# Patient Record
Sex: FEMALE | Race: OTHER | Employment: FULL TIME | URBAN - METROPOLITAN AREA
[De-identification: names, ages, dates, MRNs, and addresses within clinical notes are randomized per-mention and may not be internally consistent; named-entity substitution may affect disease eponyms.]

---
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
## 2021-01-27 ENCOUNTER — HOSPITAL ENCOUNTER (OUTPATIENT)
Age: 21
Discharge: HOME | End: 2021-01-27
Payer: MEDICAID

## 2021-01-27 ENCOUNTER — HOSPITAL ENCOUNTER (EMERGENCY)
Facility: HOSPITAL | Age: 21
Discharge: HOME | End: 2021-01-27
Attending: EMERGENCY MEDICINE
Payer: MEDICAID

## 2021-01-27 VITALS
HEIGHT: 63 IN | TEMPERATURE: 98 F | HEART RATE: 50 BPM | DIASTOLIC BLOOD PRESSURE: 72 MMHG | SYSTOLIC BLOOD PRESSURE: 130 MMHG | RESPIRATION RATE: 18 BRPM | OXYGEN SATURATION: 100 % | BODY MASS INDEX: 23.04 KG/M2 | WEIGHT: 130 LBS

## 2021-01-27 VITALS
SYSTOLIC BLOOD PRESSURE: 126 MMHG | HEART RATE: 73 BPM | DIASTOLIC BLOOD PRESSURE: 80 MMHG | RESPIRATION RATE: 18 BRPM | OXYGEN SATURATION: 98 % | TEMPERATURE: 97 F

## 2021-01-27 DIAGNOSIS — Z20.822 EXPOSURE TO COVID-19 VIRUS: Primary | ICD-10-CM

## 2021-01-27 DIAGNOSIS — Z20.822 ENCOUNTER FOR SCREENING LABORATORY TESTING FOR COVID-19 VIRUS: Primary | ICD-10-CM

## 2021-01-27 LAB — SARS-COV-2 RNA RESP QL NAA+PROBE: NOT DETECTED

## 2021-01-27 PROCEDURE — 99283 EMERGENCY DEPT VISIT LOW MDM: CPT

## 2021-01-27 PROCEDURE — 99212 OFFICE O/P EST SF 10 MIN: CPT | Performed by: NURSE PRACTITIONER

## 2021-01-27 NOTE — ED PROVIDER NOTES
Patient Seen in: [facility]      History   No chief complaint on file.     Stated Complaint: exposed/covid test/no symptoms    HPI/Subjective:   HPI    This is a 41-year-old female presenting for Covid testing, after exposure, asymptomatic wit ED Course     Labs Reviewed   RAPID SARS-COV-2 BY PCR - Normal                   MDM      30-year-old female well-appearing and nontoxic afebrile presenting for Covid testing after exposure. Rapid test collected and negative.   Patient made miguel
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
## 2021-01-27 NOTE — PROGRESS NOTES
ER note noted. Requested Prescriptions   Pending Prescriptions Disp Refills     pseudoePHEDrine (EQL 12 HOUR DECONGESTANT) 120 MG 12 hr tablet [Pharmacy Med Name: EQL 12 Hour Decongestant Oral Tablet Extended Release 12 Hour 120 MG] 60 tablet 2     Sig: TAKE ONE TABLET BY MOUTH EVERY TWELVE HOURS       There is no refill protocol information for this order         Last Written Prescription Date:  5/16/19  Last Fill Quantity: 60,   # refills: 3  Last Office Visit: 8/22/19  Future Office visit:       Routing refill request to provider for review/approval because:  Drug not on the [facility], P or [facility] refill protocol or controlled substance

## 2021-01-28 NOTE — Clinical Note
2/23/2017          To Whom It May Concern:    Justin Vidal is currently under my medical care. Please allow her to use the elevator at school due to knee pain for 6 weeks. If you require additional information please contact our office.         Sincere Immunization has been faxed to number that was provided [phone]

## 2021-01-29 LAB — SARS-COV-2 BY PCR: NOT DETECTED

## 2021-04-12 ENCOUNTER — TELEPHONE (OUTPATIENT)
Dept: FAMILY MEDICINE CLINIC | Facility: CLINIC | Age: 21
End: 2021-04-12

## 2021-04-12 RX ORDER — FLUCONAZOLE 150 MG/1
150 TABLET ORAL ONCE
Qty: 1 TABLET | Refills: 0 | Status: SHIPPED | OUTPATIENT
Start: 2021-04-12 | End: 2021-04-12

## 2021-04-12 NOTE — TELEPHONE ENCOUNTER
Patient calling reports has Yeast infection, thick white discharge ( cottage cheese ) and groin nodes are swollen   She cannot use OTC Monistat, she has a reaction    Declines appt at this time     Asking for RX for      Allergies reviewed and pharmacy con

## 2021-04-19 ENCOUNTER — LAB ENCOUNTER (OUTPATIENT)
Dept: LAB | Age: 21
End: 2021-04-19
Attending: DERMATOLOGY
Payer: MEDICAID

## 2021-04-19 ENCOUNTER — OFFICE VISIT (OUTPATIENT)
Dept: DERMATOLOGY CLINIC | Facility: CLINIC | Age: 21
End: 2021-04-19
Payer: MEDICAID

## 2021-04-19 DIAGNOSIS — N92.6 IRREGULAR MENSES: ICD-10-CM

## 2021-04-19 DIAGNOSIS — L70.0 ACNE VULGARIS: ICD-10-CM

## 2021-04-19 DIAGNOSIS — N92.6 IRREGULAR MENSES: Primary | ICD-10-CM

## 2021-04-19 PROCEDURE — 84402 ASSAY OF FREE TESTOSTERONE: CPT

## 2021-04-19 PROCEDURE — 84146 ASSAY OF PROLACTIN: CPT

## 2021-04-19 PROCEDURE — 99214 OFFICE O/P EST MOD 30 MIN: CPT | Performed by: DERMATOLOGY

## 2021-04-19 PROCEDURE — 84443 ASSAY THYROID STIM HORMONE: CPT

## 2021-04-19 PROCEDURE — 82627 DEHYDROEPIANDROSTERONE: CPT

## 2021-04-19 PROCEDURE — 36415 COLL VENOUS BLD VENIPUNCTURE: CPT

## 2021-04-19 PROCEDURE — 84403 ASSAY OF TOTAL TESTOSTERONE: CPT

## 2021-04-19 RX ORDER — MINOCYCLINE HYDROCHLORIDE 50 MG/1
50 TABLET ORAL 2 TIMES DAILY
Qty: 60 TABLET | Refills: 2 | Status: SHIPPED | OUTPATIENT
Start: 2021-04-19 | End: 2021-06-30

## 2021-04-25 NOTE — PROGRESS NOTES
Antolin Jones is a 22year old female. Patient presents with:  Acne: LOV 9/2020. Follow up acne to face, chest, back, and shoulders. Pt has been using clindamycin gel, tretinoin 0.5%, tretinoin 0.1%.  Pt states she has not been having breakouts but meals. (Patient not taking: Reported on 4/19/2021 ) 30 tablet 1      Past Medical History:   Diagnosis Date   • Other ill-defined conditions(799.89)     PET @ 1 yr      Social History:  Social History    Tobacco Use      Smoking status: Never Smoker      S with meals.  (Patient not taking: Reported on 4/19/2021 ) 30 tablet 1     Allergies:     Seafood                 HIVES  Strawberries            HIVES, RASH    Past Medical History:   Diagnosis Date   • Other ill-defined conditions(219.89)     PET @ 1 yr Active Member of Clubs or Organizations:       Attends Club or Organization Meetings:       Marital Status:   Intimate Partner Violence:       Fear of Current or Ex-Partner:       Emotionally Abused:       Physically Abused:       Sexually Abused:   Family face,nails, hair, external eyes, including conjunctival mucosa, eyelids, lips, external ears, back, chest, abdomen, arms, legs, palms.   Remarkable for lesions as noted extensive inflammatory papules pustules nodules and follicular keratotic lesions over th Medications in grid. Instructions reviewed at length. General skin care questions answered. Reassurance regarding benign skin lesions. Signs and symptoms of skin cancer, ABCDE's of melanoma briefly reviewed.   Sunscreen use, sun protection, encouraged

## 2021-05-12 ENCOUNTER — TELEPHONE (OUTPATIENT)
Dept: FAMILY MEDICINE CLINIC | Facility: CLINIC | Age: 21
End: 2021-05-12

## 2021-05-12 RX ORDER — DEXTROAMPHETAMINE SACCHARATE, AMPHETAMINE ASPARTATE, DEXTROAMPHETAMINE SULFATE AND AMPHETAMINE SULFATE 2.5; 2.5; 2.5; 2.5 MG/1; MG/1; MG/1; MG/1
10 TABLET ORAL 2 TIMES DAILY
Qty: 60 TABLET | Refills: 0 | Status: SHIPPED | OUTPATIENT
Start: 2021-05-12 | End: 2021-06-02

## 2021-05-12 NOTE — TELEPHONE ENCOUNTER
Message noted. May switch to see if adderall would be covered and cancel ritalin Erx sent to listed pharmacy.  Please notify patient

## 2021-05-12 NOTE — TELEPHONE ENCOUNTER
Up to patient if she wants to pay out of pocket for this if not covered by insurance. Not sure if anything is covered for these medications. Can also follow up with psychiatrist as we discuss in office about other options.

## 2021-05-12 NOTE — TELEPHONE ENCOUNTER
The medication pt was prescribed (Ritalin) was denied by her insurance because she is too old for the medication. Pt states Dr would need to either send request to insurance to approve medication. Or, pt is ok with being prescribed alternate medication.

## 2021-05-12 NOTE — TELEPHONE ENCOUNTER
Advised patient of Dr Jeffrey Gallagher  note. Patient verbalized understanding and had no further questions. She is choosing to pay out of pocket for Ritalin.

## 2021-05-12 NOTE — PROGRESS NOTES
HPI/Subjective:   Patient ID: Craig Infante is a 22year old female. This visit is conducted using Telemedicine with live, interactive video and audio during this Coronavirus pandemic.     Please note that the following visit was completed using two- Negative for agitation, behavioral problems, dysphoric mood, self-injury and suicidal ideas. The patient is nervous/anxious. The patient is not hyperactive.       Current Outpatient Medications   Medication Sig Dispense Refill   • methylphenidate (RITALIN) Physical Exam  Constitutional:       Appearance: Normal appearance. Neurological:      Mental Status: She is alert. Psychiatric:         Mood and Affect: Mood normal.         Behavior: Behavior normal.         Thought Content:  Thought content normal.

## 2021-05-12 NOTE — TELEPHONE ENCOUNTER
Pt advised, but states that the pharm told her that adderall is not covered by her insurance either. Please advise.

## 2021-06-02 ENCOUNTER — TELEPHONE (OUTPATIENT)
Dept: FAMILY MEDICINE CLINIC | Facility: CLINIC | Age: 21
End: 2021-06-02

## 2021-06-02 RX ORDER — METHYLPHENIDATE HYDROCHLORIDE 10 MG/1
10 TABLET ORAL 2 TIMES DAILY
Qty: 60 TABLET | Refills: 0 | Status: SHIPPED | OUTPATIENT
Start: 2021-06-02

## 2021-06-02 NOTE — TELEPHONE ENCOUNTER
I do not see Ritalin on her current med list.   I called the patient to inquire. She states med was prescribed by Dr. Joselin Garza at her [address], however the insurance did not cover it. Therefore it was discontinued.  However she decided to purchase it out of pocket

## 2021-06-02 NOTE — TELEPHONE ENCOUNTER
Patient wants to know if the two medications interfere with each other,  Ritalin and   Rizatriptan benzoate.       Please call [phone] for recommendation,

## 2021-06-02 NOTE — TELEPHONE ENCOUNTER
Message noted: Chart reviewed and may refill ritalin as requested. Script sent to listed pharmacy by secure method. Please notify pt. As far I know, should be ok with rizatriptan.  Could have some issues with blood pressure together and sometimes make so

## 2021-06-29 ENCOUNTER — NURSE TRIAGE (OUTPATIENT)
Dept: FAMILY MEDICINE CLINIC | Facility: CLINIC | Age: 21
End: 2021-06-29

## 2021-06-29 NOTE — TELEPHONE ENCOUNTER
Action Requested: Summary for Provider     []  Critical Lab, Recommendations Needed  [] Need Additional Advice  [x]   FYI    []   Need Orders  [] Need Medications Sent to Pharmacy  []  Other     SUMMARY:   Spoke with pt,  verified, pt stated she just no

## 2021-06-30 ENCOUNTER — OFFICE VISIT (OUTPATIENT)
Dept: FAMILY MEDICINE CLINIC | Facility: CLINIC | Age: 21
End: 2021-06-30
Payer: MEDICAID

## 2021-06-30 VITALS
HEART RATE: 69 BPM | TEMPERATURE: 97 F | BODY MASS INDEX: 22.93 KG/M2 | DIASTOLIC BLOOD PRESSURE: 68 MMHG | HEIGHT: 63 IN | SYSTOLIC BLOOD PRESSURE: 109 MMHG | WEIGHT: 129.38 LBS

## 2021-06-30 DIAGNOSIS — R22.0 LOCALIZED SWELLING, MASS AND LUMP, HEAD: ICD-10-CM

## 2021-06-30 PROCEDURE — 3008F BODY MASS INDEX DOCD: CPT | Performed by: FAMILY MEDICINE

## 2021-06-30 PROCEDURE — 3074F SYST BP LT 130 MM HG: CPT | Performed by: FAMILY MEDICINE

## 2021-06-30 PROCEDURE — 99213 OFFICE O/P EST LOW 20 MIN: CPT | Performed by: FAMILY MEDICINE

## 2021-06-30 PROCEDURE — 3078F DIAST BP <80 MM HG: CPT | Performed by: FAMILY MEDICINE

## 2021-06-30 NOTE — PROGRESS NOTES
HPI/Subjective:   Patient ID: Valentina Thapa is a 22year old female. Pt presents with a bump behind the right ear she noticed this past Monday. No pains or fevers. No redness of drainage. No acute illness, ear aches or sore throat. No other lumps.  P daily. (Patient not taking: Reported on 4/19/2021 )     • Metoclopramide HCl 5 MG Oral Tab 1 tablet as needed for nausea associated with migraine, no more than 3 tablets in 24 hours (Patient not taking: Reported on 4/19/2021 ) 30 tablet 3   • naproxen (NAP

## 2021-07-08 ENCOUNTER — HOSPITAL ENCOUNTER (EMERGENCY)
Facility: HOSPITAL | Age: 21
Discharge: HOME | End: 2021-07-08
Attending: EMERGENCY MEDICINE
Payer: MEDICAID

## 2021-07-08 ENCOUNTER — APPOINTMENT (OUTPATIENT)
Dept: GENERAL RADIOLOGY | Facility: HOSPITAL | Age: 21
End: 2021-07-08
Attending: EMERGENCY MEDICINE
Payer: MEDICAID

## 2021-07-08 VITALS
OXYGEN SATURATION: 99 % | DIASTOLIC BLOOD PRESSURE: 74 MMHG | HEIGHT: 63 IN | HEART RATE: 67 BPM | BODY MASS INDEX: 23.04 KG/M2 | TEMPERATURE: 99 F | WEIGHT: 130 LBS | SYSTOLIC BLOOD PRESSURE: 125 MMHG | RESPIRATION RATE: 16 BRPM

## 2021-07-08 DIAGNOSIS — S29.012A STRAIN OF THORACIC SPINE: ICD-10-CM
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
## 2021-07-08 LAB — B-HCG UR QL: NEGATIVE

## 2021-07-08 PROCEDURE — 96372 THER/PROPH/DIAG INJ SC/IM: CPT

## 2021-07-08 PROCEDURE — 72072 X-RAY EXAM THORAC SPINE 3VWS: CPT | Performed by: EMERGENCY MEDICINE

## 2021-07-08 PROCEDURE — 71101 X-RAY EXAM UNILAT RIBS/CHEST: CPT | Performed by: EMERGENCY MEDICINE

## 2021-07-08 PROCEDURE — 81025 URINE PREGNANCY TEST: CPT

## 2021-07-08 PROCEDURE — 99284 EMERGENCY DEPT VISIT MOD MDM: CPT

## 2021-07-08 RX ORDER — CYCLOBENZAPRINE HCL 10 MG
10 TABLET ORAL 3 TIMES DAILY PRN
Qty: 10 TABLET | Refills: 0 | Status: SHIPPED | OUTPATIENT
Start: 2021-07-08 | End: 2021-07-11

## 2021-07-08 RX ORDER — KETOROLAC TROMETHAMINE 30 MG/ML
30 INJECTION, SOLUTION INTRAMUSCULAR; INTRAVENOUS ONCE
Status: COMPLETED | OUTPATIENT
Start: 2021-07-08 | End: 2021-07-08

## 2021-07-08 RX ORDER — KETOROLAC TROMETHAMINE 30 MG/ML
30 INJECTION, SOLUTION INTRAMUSCULAR; INTRAVENOUS ONCE
Status: DISCONTINUED | OUTPATIENT
Start: 2021-07-08 | End: 2021-07-08

## 2021-07-08 RX ORDER — IBUPROFEN 600 MG/1
600 TABLET ORAL EVERY 8 HOURS PRN
Qty: 15 TABLET | Refills: 0 | Status: SHIPPED | OUTPATIENT
Start: 2021-07-08 | End: 2021-07-13

## 2021-07-08 RX ORDER — LIDOCAINE 50 MG/G
1 PATCH TOPICAL EVERY 24 HOURS
Qty: 6 PATCH | Refills: 0 | Status: SHIPPED | OUTPATIENT
Start: 2021-07-08 | End: 2021-07-14
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
## 2021-07-14 ENCOUNTER — OFFICE VISIT (OUTPATIENT)
Dept: OBGYN CLINIC | Facility: CLINIC | Age: 21
End: 2021-07-14
Payer: MEDICAID

## 2021-07-14 ENCOUNTER — HOSPITAL ENCOUNTER (OUTPATIENT)
Age: 21
Discharge: HOME | End: 2021-07-14
Attending: EMERGENCY MEDICINE
Payer: MEDICAID

## 2021-07-14 VITALS
DIASTOLIC BLOOD PRESSURE: 79 MMHG | HEART RATE: 62 BPM | BODY MASS INDEX: 23 KG/M2 | WEIGHT: 130 LBS | SYSTOLIC BLOOD PRESSURE: 119 MMHG

## 2021-07-14 VITALS
TEMPERATURE: 98 F | OXYGEN SATURATION: 99 % | HEART RATE: 79 BPM | RESPIRATION RATE: 18 BRPM | DIASTOLIC BLOOD PRESSURE: 68 MMHG | SYSTOLIC BLOOD PRESSURE: 117 MMHG

## 2021-07-14 DIAGNOSIS — N89.8 VAGINAL DISCHARGE: Primary | ICD-10-CM

## 2021-07-14 DIAGNOSIS — N92.1 BREAKTHROUGH BLEEDING ASSOCIATED WITH INTRAUTERINE DEVICE (IUD): ICD-10-CM

## 2021-07-14 DIAGNOSIS — J02.9 VIRAL PHARYNGITIS: Primary | ICD-10-CM

## 2021-07-14 DIAGNOSIS — Z30.431 IUD CHECK UP: ICD-10-CM

## 2021-07-14 DIAGNOSIS — Z97.5 BREAKTHROUGH BLEEDING ASSOCIATED WITH INTRAUTERINE DEVICE (IUD): ICD-10-CM

## 2021-07-14 LAB
S PYO AG THROAT QL: NEGATIVE
SARS-COV-2 RNA RESP QL NAA+PROBE: NOT DETECTED

## 2021-07-14 PROCEDURE — 3074F SYST BP LT 130 MM HG: CPT | Performed by: ADVANCED PRACTICE MIDWIFE

## 2021-07-14 PROCEDURE — 87880 STREP A ASSAY W/OPTIC: CPT

## 2021-07-14 PROCEDURE — 99213 OFFICE O/P EST LOW 20 MIN: CPT | Performed by: ADVANCED PRACTICE MIDWIFE

## 2021-07-14 PROCEDURE — 3078F DIAST BP <80 MM HG: CPT | Performed by: ADVANCED PRACTICE MIDWIFE

## 2021-07-14 PROCEDURE — 99213 OFFICE O/P EST LOW 20 MIN: CPT

## 2021-07-14 RX ORDER — DOXYCYCLINE HYCLATE 100 MG
100 TABLET ORAL 2 TIMES DAILY
Qty: 15 TABLET | Refills: 0 | Status: SHIPPED | OUTPATIENT
Start: 2021-07-14 | End: 2021-07-21

## 2021-07-14 NOTE — ED INITIAL ASSESSMENT (HPI)
Patient with a sore throat since Sunday. Patient may have had an exposure to COVID at her job but is not sure.  Patient requests a COVID test.

## 2021-07-16 LAB
GENITAL VAGINOSIS SCREEN: NEGATIVE
TRICHOMONAS SCREEN: NEGATIVE

## 2021-07-16 NOTE — ED PROVIDER NOTES
Patient Seen in: [facility]      History   Patient presents with:  Sore Throat    Stated Complaint: sore throat    HPI/Subjective:   HPI    21 yo female with several days of sore throat. Thinks she has had a COVID exposure at work. No fever. Lymphadenopathy:      Cervical: No cervical adenopathy. Skin:     General: Skin is warm and dry. Capillary Refill: Capillary refill takes less than 2 seconds. Neurological:      General: No focal deficit present.       Mental Status: She is alert

## 2021-07-17 NOTE — PROGRESS NOTES
HPI/Subjective:   Patient ID: Jae Krishnan is a 22year old female who presents with increased vaginal discharge since placement of IUD. Discharge has no odor and she has no vaginal irritation. IUD x 18 months still has spotting most days.   Desires Area: No rash or pubic lice. Labia:         Right: No rash, tenderness, lesion or injury. Left: No rash, tenderness, lesion or injury. Vagina: No signs of injury (thin no odor) and foreign body. Vaginal discharge present.  No erythema, t

## 2021-08-04 ENCOUNTER — OFFICE VISIT (OUTPATIENT)
Dept: OBGYN CLINIC | Facility: CLINIC | Age: 21
End: 2021-08-04
Payer: MEDICAID

## 2021-08-04 VITALS
DIASTOLIC BLOOD PRESSURE: 72 MMHG | SYSTOLIC BLOOD PRESSURE: 112 MMHG | HEART RATE: 67 BPM | BODY MASS INDEX: 22 KG/M2 | WEIGHT: 126 LBS

## 2021-08-04 DIAGNOSIS — Z30.432 ENCOUNTER FOR REMOVAL OF INTRAUTERINE CONTRACEPTIVE DEVICE: Primary | ICD-10-CM

## 2021-08-04 PROCEDURE — 3074F SYST BP LT 130 MM HG: CPT | Performed by: ADVANCED PRACTICE MIDWIFE

## 2021-08-04 PROCEDURE — 3078F DIAST BP <80 MM HG: CPT | Performed by: ADVANCED PRACTICE MIDWIFE

## 2021-08-04 PROCEDURE — 58301 REMOVE INTRAUTERINE DEVICE: CPT | Performed by: ADVANCED PRACTICE MIDWIFE

## 2021-08-04 NOTE — PROCEDURES
IUD Removal       Birth control method(s) used:   IUD    Consent signed. Procedure discussed with the patient in detail including indication, risks, benefits, alternatives and complications.     Pelvic Exam Findings:  Lesion description: Cervix grossly nor

## 2021-08-30 ENCOUNTER — HOSPITAL ENCOUNTER (OUTPATIENT)
Age: 21
Discharge: HOME | End: 2021-08-30
Attending: EMERGENCY MEDICINE
Payer: MEDICAID

## 2021-08-30 VITALS
TEMPERATURE: 99 F | HEART RATE: 84 BPM | DIASTOLIC BLOOD PRESSURE: 50 MMHG | OXYGEN SATURATION: 99 % | BODY MASS INDEX: 24.29 KG/M2 | RESPIRATION RATE: 18 BRPM | WEIGHT: 132 LBS | HEIGHT: 62 IN | SYSTOLIC BLOOD PRESSURE: 119 MMHG

## 2021-08-30 DIAGNOSIS — B34.9 VIRAL SYNDROME: Primary | ICD-10-CM

## 2021-08-30 LAB — S PYO AG THROAT QL: NEGATIVE

## 2021-08-30 PROCEDURE — 99213 OFFICE O/P EST LOW 20 MIN: CPT

## 2021-08-30 PROCEDURE — 87880 STREP A ASSAY W/OPTIC: CPT

## 2021-08-30 NOTE — ED PROVIDER NOTES
Patient Seen in: [facility]      History   Patient presents with:  Sore Throat    Stated Complaint: testing; body aches,fever,sore throat    HPI/Subjective:   HPI    The patient is a 23-year-old female with 2 days of sore throat body aches an Cardiovascular:      Rate and Rhythm: Normal rate and regular rhythm. Pulmonary:      Effort: Pulmonary effort is normal.      Breath sounds: Normal breath sounds. Musculoskeletal:      Cervical back: Normal range of motion.       Right lower leg: No

## 2021-09-01 LAB — SARS-COV-2 RNA RESP QL NAA+PROBE: DETECTED

## 2021-09-02 ENCOUNTER — TELEPHONE (OUTPATIENT)
Dept: FAMILY MEDICINE CLINIC | Facility: CLINIC | Age: 21
End: 2021-09-02

## 2021-09-02 ENCOUNTER — TELEMEDICINE (OUTPATIENT)
Dept: FAMILY MEDICINE CLINIC | Facility: CLINIC | Age: 21
End: 2021-09-02

## 2021-09-02 ENCOUNTER — HOSPITAL ENCOUNTER (EMERGENCY)
Facility: HOSPITAL | Age: 21
Discharge: HOME | End: 2021-09-02
Payer: MEDICAID

## 2021-09-02 ENCOUNTER — APPOINTMENT (OUTPATIENT)
Dept: GENERAL RADIOLOGY | Facility: HOSPITAL | Age: 21
End: 2021-09-02
Payer: MEDICAID

## 2021-09-02 VITALS
SYSTOLIC BLOOD PRESSURE: 111 MMHG | OXYGEN SATURATION: 95 % | BODY MASS INDEX: 25 KG/M2 | WEIGHT: 135 LBS | TEMPERATURE: 99 F | DIASTOLIC BLOOD PRESSURE: 64 MMHG | RESPIRATION RATE: 15 BRPM | HEART RATE: 50 BPM

## 2021-09-02 DIAGNOSIS — U07.1 COVID-19: Primary | ICD-10-CM

## 2021-09-02 DIAGNOSIS — Z20.828 EXPOSURE TO SARS-ASSOCIATED CORONAVIRUS: Primary | ICD-10-CM

## 2021-09-02 PROCEDURE — 99213 OFFICE O/P EST LOW 20 MIN: CPT | Performed by: FAMILY MEDICINE

## 2021-09-02 PROCEDURE — 71045 X-RAY EXAM CHEST 1 VIEW: CPT

## 2021-09-02 PROCEDURE — 99283 EMERGENCY DEPT VISIT LOW MDM: CPT

## 2021-09-02 NOTE — TELEPHONE ENCOUNTER
Spoke with pt,  verified, pt stated she was seen at [address] [place] [address] on 21 and was tested positive for covid. Pt stated the last 3 days she had bloody mucous with yellow mucous about nickel size, she had 4-5 x but nothing today.    Pt denies taking aspirin, NSA for Disease Control & Prevention (CDC)  What to do if you are sick with coronavirus disease 2019, [employer]. pdf  Centers for Disease Control & Prevention (CDC)  10 things you can do t

## 2021-09-02 NOTE — ED PROVIDER NOTES
Patient Seen in: [facility] AND [facility] Emergency Department      History   Patient presents with:  Cough/URI    Stated Complaint: COVID +, coughing up blood    HPI/Subjective:   HPI    26-year-old female presents the emergency department with complaint of bl (Temporal)   Resp 20   Wt 61.2 kg   LMP 08/30/2021 (Exact Date)   SpO2 98%   BMI 24.69 kg/m²         Physical Exam  Vitals reviewed. Constitutional:       Appearance: Normal appearance. HENT:      Head: Normocephalic and atraumatic.       Right Ear: Tym signs are stable. No acute findings on the chest x-ray. Discussed supportive measures. Patient is not having any shortness of breath, chest pain or leg swelling. She had 1 productive cough that had a blood-tinged sputum. No further productive cough.

## 2021-09-02 NOTE — PROGRESS NOTES
HPI/Subjective:   Patient ID: Antolin Jones is a 22year old female. Virtual Telephone Check-In    Antolin Jones verbally consents to a Virtual/Telephone Check-In visit on 09/02/21. Patient has been referred to the [facility] website at [address] [place] [address]. o distress. Neurological:      Mental Status: She is alert.    Psychiatric:         Mood and Affect: Mood normal.         Behavior: Behavior normal.         Assessment & Plan:   COVID-19:  - After discussion with patient, will send patient to the ER for fur

## 2021-09-03 NOTE — TELEPHONE ENCOUNTER
Home Monitoring Day 1 of 10. What  was your temp today? - 98.9    How did you take your temp?     with an oral thermometer    What was your pulse ox today? Are you feeling short of breath today?    No      Is the shortness of breath better, the sa

## 2021-09-07 ENCOUNTER — PATIENT MESSAGE (OUTPATIENT)
Dept: FAMILY MEDICINE CLINIC | Facility: CLINIC | Age: 21
End: 2021-09-07

## 2021-09-07 NOTE — TELEPHONE ENCOUNTER
Home Monitoring Day 5 of 7. Patient is asymptomatic as of today. Symptoms started 8/27/21. She needs a negative Covid test result to come back to work.  [employer] pending for your review and approval.    What  was your temp today? - did not take it    How did

## 2021-09-07 NOTE — TELEPHONE ENCOUNTER
----- Message -----     From: Ernesto Araya     Sent: 9/7/2021  1:13 PM CDT       To: Clayton Segura MD  Subject: Visit Follow-up Question    Good afternoon. I had a question about my quarantine.  My school and work are telling me my quarantine should

## 2021-09-07 NOTE — TELEPHONE ENCOUNTER
COVID order signed. Pt notified through [facility].  Also answered question about quarantine

## 2021-09-08 ENCOUNTER — NURSE ONLY (OUTPATIENT)
Dept: LAB | Facility: HOSPITAL | Age: 21
End: 2021-09-08
Attending: FAMILY MEDICINE
Payer: MEDICAID

## 2021-09-08 DIAGNOSIS — Z20.828 EXPOSURE TO SARS-ASSOCIATED CORONAVIRUS: ICD-10-CM

## 2021-09-09 LAB — SARS-COV-2 RNA RESP QL NAA+PROBE: NOT DETECTED

## 2021-11-03 ENCOUNTER — HOSPITAL ENCOUNTER (EMERGENCY)
Facility: HOSPITAL | Age: 21
Discharge: HOME | End: 2021-11-03
Payer: MEDICAID

## 2021-11-03 VITALS
HEIGHT: 63 IN | WEIGHT: 130 LBS | OXYGEN SATURATION: 100 % | RESPIRATION RATE: 16 BRPM | HEART RATE: 66 BPM | SYSTOLIC BLOOD PRESSURE: 120 MMHG | BODY MASS INDEX: 23.04 KG/M2 | DIASTOLIC BLOOD PRESSURE: 85 MMHG | TEMPERATURE: 98 F

## 2021-11-03 DIAGNOSIS — R10.9 ABDOMINAL CRAMPING: Primary | ICD-10-CM

## 2021-11-03 PROCEDURE — 81025 URINE PREGNANCY TEST: CPT

## 2021-11-03 PROCEDURE — 36415 COLL VENOUS BLD VENIPUNCTURE: CPT

## 2021-11-03 PROCEDURE — 85025 COMPLETE CBC W/AUTO DIFF WBC: CPT

## 2021-11-03 PROCEDURE — 99283 EMERGENCY DEPT VISIT LOW MDM: CPT

## 2021-11-03 PROCEDURE — 80048 BASIC METABOLIC PNL TOTAL CA: CPT

## 2021-11-03 PROCEDURE — 81003 URINALYSIS AUTO W/O SCOPE: CPT

## 2021-11-03 RX ORDER — IBUPROFEN 600 MG/1
600 TABLET ORAL EVERY 8 HOURS PRN
Qty: 15 TABLET | Refills: 0 | Status: SHIPPED | OUTPATIENT
Start: 2021-11-03 | End: 2021-11-10

## 2021-11-03 NOTE — ED PROVIDER NOTES
Patient Seen in: [facility] AND [facility] Emergency Department    History   Patient presents with:  Abdomen/Flank Pain    Stated Complaint: Abdominal pain; Diarrhea    HPI    23 yo F here with lower abdominal pain with no n/v/d, urinary symptoms, f/c  DC IUD [employer] N[employer] systems reviewed and negative except as noted above. PSFH elements reviewed from today and agreed except as otherwise stated in HPI.     Physical Exam     ED Triage Vitals   BP 11/03/21 1107 137/89   Pulse 11/03/21 1107 70   Resp 11/03/21 1107 16   Temp CBC W/ DIFFERENTIAL[[id number]]                              Final result                 Please view results for these tests on the individual orders.    RAINBOW DRAW LAVENDER   RAINBOW DRAW LIGHT GREEN   RAINBOW DRAW GOLD   CBC W/ DIFFERENTIAL

## 2021-11-03 NOTE — ED QUICK NOTES
Pt. Reports pain in abdomen as if it's on the inside. She said it is worse when she sits and she's been having diarrhea.  Her biggest concern is that this is the same pain she felt before she got COVID in August. She reported that she has not been vaccinate

## 2021-12-15 ENCOUNTER — IMMUNIZATION (OUTPATIENT)
Dept: LAB | Facility: HOSPITAL | Age: 21
End: 2021-12-15
Attending: EMERGENCY MEDICINE
Payer: MEDICAID

## 2021-12-15 DIAGNOSIS — Z23 NEED FOR VACCINATION: Primary | ICD-10-CM

## 2021-12-15 PROCEDURE — 0001A SARSCOV2 VAC 30MCG/0.3ML IM: CPT

## 2021-12-26 ENCOUNTER — HOSPITAL ENCOUNTER (OUTPATIENT)
Age: 21
Discharge: HOME | End: 2021-12-26
Attending: EMERGENCY MEDICINE
Payer: MEDICAID

## 2021-12-26 VITALS
HEART RATE: 82 BPM | RESPIRATION RATE: 20 BRPM | SYSTOLIC BLOOD PRESSURE: 110 MMHG | DIASTOLIC BLOOD PRESSURE: 66 MMHG | TEMPERATURE: 99 F | OXYGEN SATURATION: 99 %

## 2021-12-26 DIAGNOSIS — U07.1 COVID-19: Primary | ICD-10-CM

## 2021-12-26 LAB
S PYO AG THROAT QL: NEGATIVE
SARS-COV-2 RNA RESP QL NAA+PROBE: DETECTED

## 2021-12-26 PROCEDURE — 99212 OFFICE O/P EST SF 10 MIN: CPT

## 2021-12-26 PROCEDURE — 87880 STREP A ASSAY W/OPTIC: CPT

## 2021-12-26 NOTE — ED PROVIDER NOTES
Patient Seen in: [facility]      History   Patient presents with:  Sore Throat    Stated Complaint: Strep test    Subjective:   HPI    Patient is a 41-year-old female, status post first Covid vaccination last week, who presents now with mild out of 5 and symmetric in the upper and lower extremities bilaterally  Extremities: No focal swelling or tenderness  Skin: No pallor, no redness or warmth to the touch      ED Course     Labs Reviewed   RAPID SARS-COV-2 BY PCR - Abnormal; Notable for the f

## 2021-12-27 ENCOUNTER — HOSPITAL ENCOUNTER (EMERGENCY)
Facility: HOSPITAL | Age: 21
Discharge: LEFT BEFORE DISCHARGE | End: 2021-12-27
Payer: MEDICAID

## 2021-12-30 ENCOUNTER — TELEMEDICINE (OUTPATIENT)
Dept: FAMILY MEDICINE CLINIC | Facility: CLINIC | Age: 21
End: 2021-12-30

## 2021-12-30 DIAGNOSIS — U07.1 COVID-19: Primary | ICD-10-CM

## 2021-12-30 PROCEDURE — 99213 OFFICE O/P EST LOW 20 MIN: CPT | Performed by: FAMILY MEDICINE

## 2021-12-30 NOTE — PROGRESS NOTES
Subjective:   Patient ID: Florian Fothergill is a 22year old female. This visit is conducted using Telemedicine with live, interactive video and audio during this Coronavirus pandemic.     Please note that the following visit was completed using two-way, 3   • Cranberry-Vitamin C (AZO CRANBERRY URINARY TRACT OR) Take by mouth. Allergies:  Seafood                 HIVES  Strawberries            HIVES, RASH    Objective:   Physical Exam  Constitutional:       General: She is not in acute distress.      A

## 2022-01-05 ENCOUNTER — HOSPITAL ENCOUNTER (EMERGENCY)
Facility: HOSPITAL | Age: 22
Discharge: HOME | End: 2022-01-05
Attending: EMERGENCY MEDICINE
Payer: MEDICAID

## 2022-01-05 ENCOUNTER — APPOINTMENT (OUTPATIENT)
Dept: CT IMAGING | Facility: HOSPITAL | Age: 22
End: 2022-01-05
Attending: EMERGENCY MEDICINE
Payer: MEDICAID

## 2022-01-05 VITALS
SYSTOLIC BLOOD PRESSURE: 121 MMHG | WEIGHT: 130 LBS | HEIGHT: 62 IN | TEMPERATURE: 98 F | RESPIRATION RATE: 18 BRPM | OXYGEN SATURATION: 98 % | BODY MASS INDEX: 23.92 KG/M2 | HEART RATE: 70 BPM | DIASTOLIC BLOOD PRESSURE: 72 MMHG

## 2022-01-05 DIAGNOSIS — M54.2 NECK PAIN: Primary | ICD-10-CM

## 2022-01-05 DIAGNOSIS — M54.12 CERVICAL RADICULOPATHY: ICD-10-CM

## 2022-01-05 PROCEDURE — 99284 EMERGENCY DEPT VISIT MOD MDM: CPT

## 2022-01-05 PROCEDURE — 72125 CT NECK SPINE W/O DYE: CPT | Performed by: EMERGENCY MEDICINE

## 2022-01-05 PROCEDURE — 96372 THER/PROPH/DIAG INJ SC/IM: CPT

## 2022-01-05 RX ORDER — LIDOCAINE 50 MG/G
1 PATCH TOPICAL EVERY 24 HOURS
Qty: 6 PATCH | Refills: 0 | Status: SHIPPED | OUTPATIENT
Start: 2022-01-05 | End: 2022-01-11

## 2022-01-05 RX ORDER — HYDROCODONE BITARTRATE AND ACETAMINOPHEN 5; 325 MG/1; MG/1
1 TABLET ORAL EVERY 6 HOURS PRN
Qty: 10 TABLET | Refills: 0 | Status: SHIPPED | OUTPATIENT
Start: 2022-01-05

## 2022-01-05 RX ORDER — KETOROLAC TROMETHAMINE 30 MG/ML
30 INJECTION, SOLUTION INTRAMUSCULAR; INTRAVENOUS ONCE
Status: COMPLETED | OUTPATIENT
Start: 2022-01-05 | End: 2022-01-05

## 2022-01-05 RX ORDER — METHYLPREDNISOLONE 4 MG/1
TABLET ORAL
Qty: 1 EACH | Refills: 0 | Status: SHIPPED | OUTPATIENT
Start: 2022-01-05
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
## 2022-01-11 ENCOUNTER — TELEPHONE (OUTPATIENT)
Dept: PHYSICAL MEDICINE AND REHAB | Facility: CLINIC | Age: 22
End: 2022-01-11

## 2022-05-03 ENCOUNTER — OFFICE VISIT (OUTPATIENT)
Dept: OBGYN CLINIC | Facility: CLINIC | Age: 22
End: 2022-05-03
Payer: MEDICAID

## 2022-05-03 VITALS
HEART RATE: 75 BPM | HEIGHT: 62 IN | DIASTOLIC BLOOD PRESSURE: 86 MMHG | WEIGHT: 132 LBS | SYSTOLIC BLOOD PRESSURE: 133 MMHG | BODY MASS INDEX: 24.29 KG/M2

## 2022-05-03 DIAGNOSIS — Z30.430 ENCOUNTER FOR INSERTION OF INTRAUTERINE CONTRACEPTIVE DEVICE: ICD-10-CM

## 2022-05-03 DIAGNOSIS — Z12.4 SCREENING FOR MALIGNANT NEOPLASM OF CERVIX: ICD-10-CM

## 2022-05-03 DIAGNOSIS — Z32.00 PREGNANCY EXAMINATION OR TEST, PREGNANCY UNCONFIRMED: ICD-10-CM

## 2022-05-03 DIAGNOSIS — Z01.419 WOMEN'S ANNUAL ROUTINE GYNECOLOGICAL EXAMINATION: Primary | ICD-10-CM

## 2022-05-03 DIAGNOSIS — N89.8 VAGINAL DISCHARGE: ICD-10-CM

## 2022-05-03 DIAGNOSIS — Z11.3 SCREENING EXAMINATION FOR STD (SEXUALLY TRANSMITTED DISEASE): ICD-10-CM

## 2022-05-03 LAB
CONTROL LINE PRESENT WITH A CLEAR BACKGROUND (YES/NO): YES YES/NO
PREGNANCY TEST, URINE: NEGATIVE

## 2022-05-03 PROCEDURE — 3075F SYST BP GE 130 - 139MM HG: CPT | Performed by: ADVANCED PRACTICE MIDWIFE

## 2022-05-03 PROCEDURE — 3008F BODY MASS INDEX DOCD: CPT | Performed by: ADVANCED PRACTICE MIDWIFE

## 2022-05-03 PROCEDURE — 81025 URINE PREGNANCY TEST: CPT | Performed by: ADVANCED PRACTICE MIDWIFE

## 2022-05-03 PROCEDURE — 99395 PREV VISIT EST AGE 18-39: CPT | Performed by: ADVANCED PRACTICE MIDWIFE

## 2022-05-03 PROCEDURE — 3079F DIAST BP 80-89 MM HG: CPT | Performed by: ADVANCED PRACTICE MIDWIFE

## 2022-05-03 PROCEDURE — 58300 INSERT INTRAUTERINE DEVICE: CPT | Performed by: ADVANCED PRACTICE MIDWIFE

## 2022-05-03 NOTE — PROCEDURES
IUD Insertion     Pregnancy Results: negative from urine test   Birth control method(s) used:   condoms:      Consent signed. Procedure discussed with the patient in detail including indication, risks, benefits, alternatives and complications. Pelvic Exam Findings:  See progress note    Procedure:  Speculum placed in the vagina. Hepaclense wash of vagina and cervix. Single tooth tenaculum was placed at the 12 o'clock position. Uterus sounded to 7 cm. Ann Marie IUD was placed without difficulty. Strings cut at 3 cm. Single tooth tenaculum removed. Pressure used to achieve hemostasis. Good hemostasis noted. GC/CHL screen performed. Patient tolerated procedure well. Visit Plan:  IUD surveillance was discussed with the patient.

## 2022-05-04 LAB
C TRACH DNA SPEC QL NAA+PROBE: NEGATIVE
N GONORRHOEA DNA SPEC QL NAA+PROBE: NEGATIVE

## 2022-05-06 LAB
GENITAL VAGINOSIS SCREEN: NEGATIVE
TRICHOMONAS SCREEN: NEGATIVE

## 2022-05-17 ENCOUNTER — OFFICE VISIT (OUTPATIENT)
Dept: OBGYN CLINIC | Facility: CLINIC | Age: 22
End: 2022-05-17
Payer: MEDICAID

## 2022-05-17 ENCOUNTER — TELEPHONE (OUTPATIENT)
Dept: OBGYN CLINIC | Facility: CLINIC | Age: 22
End: 2022-05-17

## 2022-05-17 ENCOUNTER — LAB ENCOUNTER (OUTPATIENT)
Dept: LAB | Facility: HOSPITAL | Age: 22
End: 2022-05-17
Attending: ADVANCED PRACTICE MIDWIFE
Payer: MEDICAID

## 2022-05-17 VITALS
HEART RATE: 69 BPM | DIASTOLIC BLOOD PRESSURE: 84 MMHG | WEIGHT: 133 LBS | BODY MASS INDEX: 24.48 KG/M2 | HEIGHT: 62 IN | SYSTOLIC BLOOD PRESSURE: 121 MMHG

## 2022-05-17 DIAGNOSIS — O36.80X0 PREGNANCY WITH UNCERTAIN FETAL VIABILITY, SINGLE OR UNSPECIFIED FETUS: Primary | ICD-10-CM

## 2022-05-17 DIAGNOSIS — Z30.432 ENCOUNTER FOR REMOVAL OF INTRAUTERINE CONTRACEPTIVE DEVICE: ICD-10-CM

## 2022-05-17 DIAGNOSIS — Z71.1 CONCERN ABOUT UNPLANNED PREGNANCY WITHOUT DIAGNOSIS: Primary | ICD-10-CM

## 2022-05-17 DIAGNOSIS — Z32.00 PREGNANCY EXAMINATION OR TEST, PREGNANCY UNCONFIRMED: ICD-10-CM

## 2022-05-17 LAB
B-HCG SERPL-ACNC: ABNORMAL MIU/ML
CONTROL LINE PRESENT WITH A CLEAR BACKGROUND (YES/NO): YES YES/NO
PREGNANCY TEST, URINE: POSITIVE

## 2022-05-17 PROCEDURE — 58301 REMOVE INTRAUTERINE DEVICE: CPT | Performed by: ADVANCED PRACTICE MIDWIFE

## 2022-05-17 PROCEDURE — 3079F DIAST BP 80-89 MM HG: CPT | Performed by: ADVANCED PRACTICE MIDWIFE

## 2022-05-17 PROCEDURE — 81025 URINE PREGNANCY TEST: CPT | Performed by: ADVANCED PRACTICE MIDWIFE

## 2022-05-17 PROCEDURE — 3074F SYST BP LT 130 MM HG: CPT | Performed by: ADVANCED PRACTICE MIDWIFE

## 2022-05-17 PROCEDURE — 3008F BODY MASS INDEX DOCD: CPT | Performed by: ADVANCED PRACTICE MIDWIFE

## 2022-05-17 PROCEDURE — 36415 COLL VENOUS BLD VENIPUNCTURE: CPT

## 2022-05-17 PROCEDURE — 84702 CHORIONIC GONADOTROPIN TEST: CPT

## 2022-05-17 NOTE — TELEPHONE ENCOUNTER
----- Message from Pete Paez CNM sent at 5/17/2022  2:33 PM CDT -----  [place] is [address] pt should have an ultrasound for dating and pregnancy viability

## 2022-05-17 NOTE — PROCEDURES
IUD Removal     Pregnancy Results: positive from urine test   Birth control method(s) used:  ; IUD    Consent signed. Procedure discussed with the patient in detail including indication, risks, benefits, alternatives and complications. Reviewed with patient increased risk of SAB with removal of IUD  Reviewed with patient increased risk of ectopic pregnancy with IUD     Pelvic Exam Findings:  Lesion description: cervix grossly normal    Procedure:  Speculum placed in the vagina. Betadine wash of vagina and cervix. An Endocervical speculum was used to visualize strings. An Allis clamp used to grasp IUD strings. IUD was removed without difficulty. IUD appears complete and intact and visualized by myself and ptient  The patient tolerated the procedure well. Visit Plan:  Discharge instructions were reviewed with the patient.

## 2022-05-17 NOTE — TELEPHONE ENCOUNTER
Spoke with pt advised of lab results and MES rec's. Order for ultrasound entered and pt states [facility] Pt agreed and voiced understanding.

## 2022-05-18 ENCOUNTER — TELEPHONE (OUTPATIENT)
Dept: OBGYN CLINIC | Facility: CLINIC | Age: 22
End: 2022-05-18

## 2022-05-18 ENCOUNTER — HOSPITAL ENCOUNTER (OUTPATIENT)
Dept: ULTRASOUND IMAGING | Facility: HOSPITAL | Age: 22
Discharge: HOME | End: 2022-05-18
Attending: ADVANCED PRACTICE MIDWIFE
Payer: MEDICAID

## 2022-05-18 DIAGNOSIS — O36.80X0 PREGNANCY WITH UNCERTAIN FETAL VIABILITY, SINGLE OR UNSPECIFIED FETUS: Primary | ICD-10-CM

## 2022-05-18 DIAGNOSIS — O36.80X0 PREGNANCY WITH UNCERTAIN FETAL VIABILITY, SINGLE OR UNSPECIFIED FETUS: ICD-10-CM

## 2022-05-18 PROCEDURE — 76817 TRANSVAGINAL US OBSTETRIC: CPT | Performed by: ADVANCED PRACTICE MIDWIFE

## 2022-05-18 PROCEDURE — 76801 OB US < 14 WKS SINGLE FETUS: CPT | Performed by: ADVANCED PRACTICE MIDWIFE

## 2022-05-19 ENCOUNTER — LAB ENCOUNTER (OUTPATIENT)
Dept: LAB | Facility: HOSPITAL | Age: 22
End: 2022-05-19
Attending: ADVANCED PRACTICE MIDWIFE
Payer: MEDICAID

## 2022-05-19 DIAGNOSIS — O36.80X0 PREGNANCY WITH UNCERTAIN FETAL VIABILITY, SINGLE OR UNSPECIFIED FETUS: ICD-10-CM

## 2022-05-19 LAB — B-HCG SERPL-ACNC: ABNORMAL MIU/ML

## 2022-05-19 PROCEDURE — 36415 COLL VENOUS BLD VENIPUNCTURE: CPT

## 2022-05-19 PROCEDURE — 84702 CHORIONIC GONADOTROPIN TEST: CPT

## 2022-05-21 ENCOUNTER — TELEPHONE (OUTPATIENT)
Dept: OBGYN CLINIC | Facility: CLINIC | Age: 22
End: 2022-05-21

## 2022-05-21 NOTE — TELEPHONE ENCOUNTER
----- Message from Shira Kaufman CNM sent at 5/21/2022  7:41 AM CDT -----  HCG did not rise normally this result and ultrasound are suggestive of a missed ab.   Monitor for bleeding pt should repeat HCG on Wed and have an appointment with me so we can discuss options  If she has an appointment on Wed repeat HCG on Tuesday

## 2022-05-21 NOTE — TELEPHONE ENCOUNTER
Spoke with pt advised of lab results and MES rec's. Pt denies vaginal bleeding or cramping. Appt scheduled with MES on Wednesday and pt will go to lab on Tuesday. Pt agreed and voiced understanding.

## 2022-05-24 ENCOUNTER — TELEPHONE (OUTPATIENT)
Dept: OBGYN CLINIC | Facility: CLINIC | Age: 22
End: 2022-05-24

## 2022-05-24 ENCOUNTER — LAB ENCOUNTER (OUTPATIENT)
Dept: LAB | Facility: HOSPITAL | Age: 22
End: 2022-05-24
Attending: ADVANCED PRACTICE MIDWIFE
Payer: MEDICAID

## 2022-05-24 DIAGNOSIS — O36.80X0 ULTRASOUND SCAN TO CONFIRM FETAL VIABILITY WITH HISTORY OF MISCARRIAGE: Primary | ICD-10-CM

## 2022-05-24 DIAGNOSIS — O36.80X0 ENCOUNTER TO DETERMINE FETAL VIABILITY OF PREGNANCY, SINGLE OR UNSPECIFIED FETUS: Primary | ICD-10-CM

## 2022-05-24 DIAGNOSIS — Z87.59 ULTRASOUND SCAN TO CONFIRM FETAL VIABILITY WITH HISTORY OF MISCARRIAGE: ICD-10-CM

## 2022-05-24 DIAGNOSIS — Z87.59 ULTRASOUND SCAN TO CONFIRM FETAL VIABILITY WITH HISTORY OF MISCARRIAGE: Primary | ICD-10-CM

## 2022-05-24 DIAGNOSIS — O36.80X0 ULTRASOUND SCAN TO CONFIRM FETAL VIABILITY WITH HISTORY OF MISCARRIAGE: ICD-10-CM

## 2022-05-24 LAB — B-HCG SERPL-ACNC: ABNORMAL MIU/ML

## 2022-05-24 PROCEDURE — 36415 COLL VENOUS BLD VENIPUNCTURE: CPT

## 2022-05-24 PROCEDURE — 84702 CHORIONIC GONADOTROPIN TEST: CPT

## 2022-05-24 NOTE — TELEPHONE ENCOUNTER
Received called from lab with hcg results. Per mes hcg levels went up. Patient to have stat hold and call ultrasound tomorrow morning before appt with cnm rule out ectopic/confirm viability     Spoke to patient, advised of results and [facility] recommendations. Advised I will call her tomorrow with a time for the ultrasound.  Patient agrees with plan and verbally understands

## 2022-05-24 NOTE — TELEPHONE ENCOUNTER
Spoke to patient, advised patient hcg level entered in system. She is to stop by the lab to have it drawn.  Patient verbally understands

## 2022-05-25 ENCOUNTER — HOSPITAL ENCOUNTER (OUTPATIENT)
Dept: ULTRASOUND IMAGING | Facility: HOSPITAL | Age: 22
Discharge: HOME | End: 2022-05-25
Attending: ADVANCED PRACTICE MIDWIFE
Payer: MEDICAID

## 2022-05-25 ENCOUNTER — OFFICE VISIT (OUTPATIENT)
Dept: OBGYN CLINIC | Facility: CLINIC | Age: 22
End: 2022-05-25
Payer: MEDICAID

## 2022-05-25 VITALS
SYSTOLIC BLOOD PRESSURE: 121 MMHG | WEIGHT: 132 LBS | DIASTOLIC BLOOD PRESSURE: 82 MMHG | BODY MASS INDEX: 24 KG/M2 | HEART RATE: 52 BPM

## 2022-05-25 DIAGNOSIS — Z34.90 UNPLANNED PREGNANCY: ICD-10-CM

## 2022-05-25 DIAGNOSIS — Z33.1 INCIDENTAL PREGNANCY CONFIRMED: Primary | ICD-10-CM

## 2022-05-25 DIAGNOSIS — O36.80X0 ENCOUNTER TO DETERMINE FETAL VIABILITY OF PREGNANCY, SINGLE OR UNSPECIFIED FETUS: ICD-10-CM

## 2022-05-25 PROCEDURE — 99214 OFFICE O/P EST MOD 30 MIN: CPT | Performed by: ADVANCED PRACTICE MIDWIFE

## 2022-05-25 PROCEDURE — 76817 TRANSVAGINAL US OBSTETRIC: CPT | Performed by: ADVANCED PRACTICE MIDWIFE

## 2022-05-25 PROCEDURE — 3079F DIAST BP 80-89 MM HG: CPT | Performed by: ADVANCED PRACTICE MIDWIFE

## 2022-05-25 PROCEDURE — 3074F SYST BP LT 130 MM HG: CPT | Performed by: ADVANCED PRACTICE MIDWIFE

## 2022-05-25 PROCEDURE — 76801 OB US < 14 WKS SINGLE FETUS: CPT | Performed by: ADVANCED PRACTICE MIDWIFE

## 2022-05-25 NOTE — TELEPHONE ENCOUNTER
Hold and call scheduled for 9:30, Patient given prep instructions.  Patient agree with plan and verbally understands

## 2022-05-31 ENCOUNTER — TELEPHONE (OUTPATIENT)
Dept: OBGYN CLINIC | Facility: CLINIC | Age: 22
End: 2022-05-31

## 2022-05-31 NOTE — TELEPHONE ENCOUNTER
reports brown spotting yesterday, enough to fill a liner but none today. Denies any cramping. Had subchorionic hemorrhage on ultrasound. Discussed it could be from subchorionic hemorrhage & as long as it has stopped it most likely is resolving. Instructed pt to call if bleeding resumes, turns red in color or if she experiences any cramping.  Pt verbalized an understanding & agrees w/ plan

## 2022-06-05 ENCOUNTER — APPOINTMENT (OUTPATIENT)
Dept: ULTRASOUND IMAGING | Facility: HOSPITAL | Age: 22
End: 2022-06-05
Attending: NURSE PRACTITIONER
Payer: MEDICAID

## 2022-06-05 ENCOUNTER — HOSPITAL ENCOUNTER (EMERGENCY)
Facility: HOSPITAL | Age: 22
Discharge: HOME | End: 2022-06-05
Payer: MEDICAID

## 2022-06-05 VITALS
DIASTOLIC BLOOD PRESSURE: 64 MMHG | HEIGHT: 62 IN | SYSTOLIC BLOOD PRESSURE: 116 MMHG | RESPIRATION RATE: 20 BRPM | HEART RATE: 61 BPM | BODY MASS INDEX: 23.92 KG/M2 | OXYGEN SATURATION: 99 % | WEIGHT: 130 LBS | TEMPERATURE: 99 F

## 2022-06-05 VITALS
OXYGEN SATURATION: 98 % | HEART RATE: 68 BPM | TEMPERATURE: 98 F | DIASTOLIC BLOOD PRESSURE: 77 MMHG | SYSTOLIC BLOOD PRESSURE: 123 MMHG | RESPIRATION RATE: 16 BRPM

## 2022-06-05 DIAGNOSIS — O23.41 URINARY TRACT INFECTION IN MOTHER DURING FIRST TRIMESTER OF PREGNANCY: Primary | ICD-10-CM

## 2022-06-05 DIAGNOSIS — O46.8X1 SUBCHORIONIC HEMORRHAGE OF PLACENTA IN FIRST TRIMESTER, SINGLE OR UNSPECIFIED FETUS: Primary | ICD-10-CM

## 2022-06-05 DIAGNOSIS — O41.8X10 SUBCHORIONIC HEMORRHAGE OF PLACENTA IN FIRST TRIMESTER, SINGLE OR UNSPECIFIED FETUS: Primary | ICD-10-CM

## 2022-06-05 LAB
B-HCG SERPL-ACNC: ABNORMAL MIU/ML
B-HCG UR QL: POSITIVE
BILIRUB UR QL: NEGATIVE
COLOR UR: YELLOW
GLUCOSE UR-MCNC: NEGATIVE MG/DL
KETONES UR-MCNC: NEGATIVE MG/DL
NITRITE UR QL STRIP.AUTO: NEGATIVE
PH UR: 7 [PH] (ref 5–8)
PROT UR-MCNC: NEGATIVE MG/DL
SP GR UR STRIP: 1.02 (ref 1–1.03)
UROBILINOGEN UR STRIP-ACNC: 2
VIT C UR-MCNC: NEGATIVE MG/DL

## 2022-06-05 PROCEDURE — 76817 TRANSVAGINAL US OBSTETRIC: CPT | Performed by: NURSE PRACTITIONER

## 2022-06-05 PROCEDURE — 84702 CHORIONIC GONADOTROPIN TEST: CPT | Performed by: NURSE PRACTITIONER

## 2022-06-05 PROCEDURE — 99284 EMERGENCY DEPT VISIT MOD MDM: CPT

## 2022-06-05 PROCEDURE — 76801 OB US < 14 WKS SINGLE FETUS: CPT | Performed by: NURSE PRACTITIONER

## 2022-06-05 PROCEDURE — 81025 URINE PREGNANCY TEST: CPT

## 2022-06-05 PROCEDURE — 87086 URINE CULTURE/COLONY COUNT: CPT

## 2022-06-05 PROCEDURE — 99283 EMERGENCY DEPT VISIT LOW MDM: CPT

## 2022-06-05 PROCEDURE — 36415 COLL VENOUS BLD VENIPUNCTURE: CPT

## 2022-06-05 PROCEDURE — 81001 URINALYSIS AUTO W/SCOPE: CPT

## 2022-06-05 RX ORDER — CEPHALEXIN 500 MG/1
500 CAPSULE ORAL ONCE
Status: COMPLETED | OUTPATIENT
Start: 2022-06-05 | End: 2022-06-05

## 2022-06-05 RX ORDER — CEPHALEXIN 500 MG/1
500 CAPSULE ORAL 2 TIMES DAILY
Qty: 14 CAPSULE | Refills: 0 | Status: SHIPPED | OUTPATIENT
Start: 2022-06-05 | End: 2022-06-12

## 2022-06-16 ENCOUNTER — TELEPHONE (OUTPATIENT)
Dept: OBGYN CLINIC | Facility: CLINIC | Age: 22
End: 2022-06-16

## 2022-06-16 ENCOUNTER — OFFICE VISIT (OUTPATIENT)
Dept: OBGYN CLINIC | Facility: CLINIC | Age: 22
End: 2022-06-16
Payer: MEDICAID

## 2022-06-16 VITALS — TEMPERATURE: 98 F | SYSTOLIC BLOOD PRESSURE: 118 MMHG | DIASTOLIC BLOOD PRESSURE: 79 MMHG | HEART RATE: 79 BPM

## 2022-06-16 DIAGNOSIS — R39.9 UTI SYMPTOMS: ICD-10-CM

## 2022-06-16 DIAGNOSIS — Z34.01 ENCOUNTER FOR SUPERVISION OF NORMAL FIRST PREGNANCY IN FIRST TRIMESTER: Primary | ICD-10-CM

## 2022-06-16 DIAGNOSIS — Z11.3 SCREENING FOR STD (SEXUALLY TRANSMITTED DISEASE): ICD-10-CM

## 2022-06-16 LAB
APPEARANCE: CLEAR
BILIRUBIN: NEGATIVE
GLUCOSE (URINE DIPSTICK): NEGATIVE MG/DL
KETONES (URINE DIPSTICK): NEGATIVE MG/DL
MULTISTIX LOT#: ABNORMAL NUMERIC
NITRITE, URINE: NEGATIVE
PH, URINE: 7.5 (ref 4.5–8)
PROTEIN (URINE DIPSTICK): NEGATIVE MG/DL
SPECIFIC GRAVITY: 1 (ref 1–1.03)
URINE-COLOR: CLEAR
UROBILINOGEN,SEMI-QN: 0.2 MG/DL (ref 0–1.9)

## 2022-06-16 PROCEDURE — 87661 TRICHOMONAS VAGINALIS AMPLIF: CPT | Performed by: ADVANCED PRACTICE MIDWIFE

## 2022-06-16 PROCEDURE — 3074F SYST BP LT 130 MM HG: CPT | Performed by: ADVANCED PRACTICE MIDWIFE

## 2022-06-16 PROCEDURE — 0502F SUBSEQUENT PRENATAL CARE: CPT | Performed by: ADVANCED PRACTICE MIDWIFE

## 2022-06-16 PROCEDURE — 3078F DIAST BP <80 MM HG: CPT | Performed by: ADVANCED PRACTICE MIDWIFE

## 2022-06-16 PROCEDURE — 81002 URINALYSIS NONAUTO W/O SCOPE: CPT | Performed by: ADVANCED PRACTICE MIDWIFE

## 2022-06-16 NOTE — TELEPHONE ENCOUNTER
Pt went to ER 6/5 for UTI, they gave her medication.  Pt still has symptoms burning & frequent need to urinate

## 2022-06-16 NOTE — TELEPHONE ENCOUNTER
Reports was treated for UTI on 6/5. Completed course of meds on Monday & still having symptoms. C/o urgency, frequency & burning. appt offered & scheduled.  Pt verbalized an understanding & agrees w/ plan

## 2022-06-17 LAB
C TRACH DNA SPEC QL NAA+PROBE: NEGATIVE
N GONORRHOEA DNA SPEC QL NAA+PROBE: NEGATIVE

## 2022-06-18 ENCOUNTER — NURSE ONLY (OUTPATIENT)
Dept: OBGYN CLINIC | Facility: CLINIC | Age: 22
End: 2022-06-18
Payer: MEDICAID

## 2022-06-18 VITALS — WEIGHT: 131 LBS | BODY MASS INDEX: 24 KG/M2

## 2022-06-18 DIAGNOSIS — Z34.90 PREGNANCY, UNSPECIFIED GESTATIONAL AGE: Primary | ICD-10-CM

## 2022-06-18 LAB
GENITAL VAGINOSIS SCREEN: NEGATIVE
TRICHOMONAS SCREEN: NEGATIVE

## 2022-06-18 PROCEDURE — 0500F INITIAL PRENATAL CARE VISIT: CPT | Performed by: ADVANCED PRACTICE MIDWIFE

## 2022-06-18 NOTE — PROGRESS NOTES
Nurse education complete via phone visit. Pt instructed to review folder & handouts. . Pt eligible for [facility] referrals & pt accepted. Referrals faxed. Pt has cats at home but does not change the litter box. Toxo titers ordered w/ [facility] labs. Has hx of depression at age 13. No longer on meds or in therapy. Denies SI or HI or BH referral at this time. Had pap  & normal. Desires FTS. Order placed & contact info sent to pt. Pt to complete EPDS & ELVA at next visit [facility] appt scheduled. Pt desires natural child birth    Pt. Answered YES to any 5P questions and/or  risk factors are present. Father is alcoholic    Pt. Education was provided on OUD and pregnancy, including the benefits of treatment for mom and baby and the risk of DANNY. Pt. Given OUD and DANNY handouts. Pt counseled on ACOG & ACNM guidelines recommending carrier testing. Carrier Testing, including Hemoglobin Evaluation offered through pt & insurance choice of [facility] or [facility].  Pt declines testing

## 2022-06-20 ENCOUNTER — LAB ENCOUNTER (OUTPATIENT)
Dept: LAB | Facility: HOSPITAL | Age: 22
End: 2022-06-20
Attending: ADVANCED PRACTICE MIDWIFE
Payer: MEDICAID

## 2022-06-20 DIAGNOSIS — Z34.90 PREGNANCY, UNSPECIFIED GESTATIONAL AGE: ICD-10-CM

## 2022-06-20 LAB
ANTIBODY SCREEN: NEGATIVE
BASOPHILS # BLD AUTO: 0.07 X10(3) UL (ref 0–0.2)
BASOPHILS NFR BLD AUTO: 0.8 %
DEPRECATED RDW RBC AUTO: 37.4 FL (ref 35.1–46.3)
EOSINOPHIL # BLD AUTO: 0.09 X10(3) UL (ref 0–0.7)
EOSINOPHIL NFR BLD AUTO: 1 %
ERYTHROCYTE [DISTWIDTH] IN BLOOD BY AUTOMATED COUNT: 11.9 % (ref 11–15)
HBV SURFACE AG SER-ACNC: <0.1 [IU]/L
HBV SURFACE AG SERPL QL IA: NONREACTIVE
HCT VFR BLD AUTO: 37.5 %
HCV AB SERPL QL IA: NONREACTIVE
HGB BLD-MCNC: 13 G/DL
IMM GRANULOCYTES # BLD AUTO: 0.04 X10(3) UL (ref 0–1)
IMM GRANULOCYTES NFR BLD: 0.4 %
LYMPHOCYTES # BLD AUTO: 1.83 X10(3) UL (ref 1–4)
LYMPHOCYTES NFR BLD AUTO: 20.4 %
MCH RBC QN AUTO: 30.2 PG (ref 26–34)
MCHC RBC AUTO-ENTMCNC: 34.7 G/DL (ref 31–37)
MCV RBC AUTO: 87.2 FL
MONOCYTES # BLD AUTO: 0.43 X10(3) UL (ref 0.1–1)
MONOCYTES NFR BLD AUTO: 4.8 %
NEUTROPHILS # BLD AUTO: 6.5 X10 (3) UL (ref 1.5–7.7)
NEUTROPHILS # BLD AUTO: 6.5 X10(3) UL (ref 1.5–7.7)
NEUTROPHILS NFR BLD AUTO: 72.6 %
PLATELET # BLD AUTO: 223 10(3)UL (ref 150–450)
RBC # BLD AUTO: 4.3 X10(6)UL
RH BLOOD TYPE: POSITIVE
RUBV IGG SER QL: POSITIVE
RUBV IGG SER-ACNC: 92.7 IU/ML (ref 10–?)
WBC # BLD AUTO: 9 X10(3) UL (ref 4–11)

## 2022-06-20 PROCEDURE — 36415 COLL VENOUS BLD VENIPUNCTURE: CPT

## 2022-06-20 PROCEDURE — 86780 TREPONEMA PALLIDUM: CPT

## 2022-06-20 PROCEDURE — 87186 SC STD MICRODIL/AGAR DIL: CPT

## 2022-06-20 PROCEDURE — 87086 URINE CULTURE/COLONY COUNT: CPT

## 2022-06-20 PROCEDURE — 87340 HEPATITIS B SURFACE AG IA: CPT

## 2022-06-20 PROCEDURE — 85025 COMPLETE CBC W/AUTO DIFF WBC: CPT

## 2022-06-20 PROCEDURE — 86900 BLOOD TYPING SEROLOGIC ABO: CPT

## 2022-06-20 PROCEDURE — 86803 HEPATITIS C AB TEST: CPT

## 2022-06-20 PROCEDURE — 86762 RUBELLA ANTIBODY: CPT

## 2022-06-20 PROCEDURE — 86777 TOXOPLASMA ANTIBODY: CPT

## 2022-06-20 PROCEDURE — 87088 URINE BACTERIA CULTURE: CPT

## 2022-06-20 PROCEDURE — 86850 RBC ANTIBODY SCREEN: CPT

## 2022-06-20 PROCEDURE — 86901 BLOOD TYPING SEROLOGIC RH(D): CPT

## 2022-06-20 PROCEDURE — 86778 TOXOPLASMA ANTIBODY IGM: CPT

## 2022-06-20 PROCEDURE — 87389 HIV-1 AG W/HIV-1&-2 AB AG IA: CPT

## 2022-06-21 ENCOUNTER — TELEPHONE (OUTPATIENT)
Dept: OBGYN CLINIC | Facility: CLINIC | Age: 22
End: 2022-06-21

## 2022-06-21 PROBLEM — O23.41 URINARY TRACT INFECTION IN MOTHER DURING FIRST TRIMESTER OF PREGNANCY: Status: ACTIVE | Noted: 2022-06-21

## 2022-06-21 PROBLEM — O23.41 URINARY TRACT INFECTION IN MOTHER DURING FIRST TRIMESTER OF PREGNANCY (HCC): Status: ACTIVE | Noted: 2022-06-21

## 2022-06-21 LAB — TRICHOMONAS VAGINALIS BY TMA: NEGATIVE

## 2022-06-21 RX ORDER — CEPHALEXIN 500 MG/1
500 CAPSULE ORAL 4 TIMES DAILY
Qty: 28 CAPSULE | Refills: 0 | Status: SHIPPED | OUTPATIENT
Start: 2022-06-21 | End: 2022-06-28

## 2022-06-21 RX ORDER — CEPHALEXIN 500 MG/1
500 CAPSULE ORAL 4 TIMES DAILY
Qty: 28 CAPSULE | Refills: 0 | Status: SHIPPED | OUTPATIENT
Start: 2022-06-21 | End: 2022-06-21

## 2022-06-21 NOTE — TELEPHONE ENCOUNTER
----- Message from Ngoc Boston CNM sent at 6/21/2022 11:05 AM CDT -----  Please notify + E coli UTI.  Rx sent to pharmacy

## 2022-06-21 NOTE — TELEPHONE ENCOUNTER
Notified pt of results & instructions per MJ. Pt states rx sent to different pharmacy. Pharmacy confirmed & sent.  Pt verbalized an understanding & agrees w/ plan

## 2022-06-22 LAB — T PALLIDUM AB SER QL: NEGATIVE

## 2022-06-23 LAB
TOXOPLASMA GONDII AB, IGG: <3 IU/ML
TOXOPLASMA GONDII AB, IGM: <3 AU/ML

## 2022-06-28 ENCOUNTER — INITIAL PRENATAL (OUTPATIENT)
Dept: OBGYN CLINIC | Facility: CLINIC | Age: 22
End: 2022-06-28
Payer: MEDICAID

## 2022-06-28 VITALS
SYSTOLIC BLOOD PRESSURE: 114 MMHG | HEART RATE: 81 BPM | DIASTOLIC BLOOD PRESSURE: 77 MMHG | WEIGHT: 130 LBS | BODY MASS INDEX: 24 KG/M2

## 2022-06-28 DIAGNOSIS — Z34.01 ENCOUNTER FOR SUPERVISION OF NORMAL FIRST PREGNANCY IN FIRST TRIMESTER: Primary | ICD-10-CM

## 2022-06-28 PROBLEM — Z86.59 HISTORY OF DEPRESSION: Status: ACTIVE | Noted: 2022-06-28

## 2022-06-28 PROBLEM — Z86.79 HISTORY OF HEART MURMUR IN CHILDHOOD: Status: ACTIVE | Noted: 2022-06-28

## 2022-06-28 LAB
APPEARANCE: CLEAR
BILIRUBIN: NEGATIVE
GLUCOSE (URINE DIPSTICK): NEGATIVE MG/DL
KETONES (URINE DIPSTICK): NEGATIVE MG/DL
LEUKOCYTES: NEGATIVE
MULTISTIX LOT#: NORMAL NUMERIC
NITRITE, URINE: NEGATIVE
OCCULT BLOOD: NEGATIVE
PH, URINE: 5 (ref 4.5–8)
PROTEIN (URINE DIPSTICK): NEGATIVE MG/DL
SPECIFIC GRAVITY: 1.03 (ref 1–1.03)
URINE-COLOR: YELLOW
UROBILINOGEN,SEMI-QN: 1 MG/DL (ref 0–1.9)

## 2022-06-28 PROCEDURE — 81002 URINALYSIS NONAUTO W/O SCOPE: CPT | Performed by: ADVANCED PRACTICE MIDWIFE

## 2022-06-28 PROCEDURE — 0502F SUBSEQUENT PRENATAL CARE: CPT | Performed by: ADVANCED PRACTICE MIDWIFE

## 2022-06-28 PROCEDURE — 3074F SYST BP LT 130 MM HG: CPT | Performed by: ADVANCED PRACTICE MIDWIFE

## 2022-06-28 PROCEDURE — 3078F DIAST BP <80 MM HG: CPT | Performed by: ADVANCED PRACTICE MIDWIFE

## 2022-06-28 NOTE — PROGRESS NOTES
Hx of depression. Previously on meds & therapy, currently none and reports stable. Some nausea, no vomiting. No pain or bleeding. + FHT's. Has 1st tri screen scheduled but decided desires NIPT instead. Nurse ordering and giving kit. To cancel appt with MFM. Warning signs reviewed. Had PE and pap in May. Dx with UTI at last visit. Just finished Rx, will do urine ISH at [facility].

## 2022-06-28 NOTE — PATIENT INSTRUCTIONS
You may take Vitamin B6 10-25 mg 3-4 times per day. Either alone or in combination with Doxylamine 12.5 mg (unisom). Paula capsules 250 mg 4 times daily  Accupressure bands (seabands) which you can find at [place], etc  If your nausea and vomiting is severe and this does not help please let us know so we can send in a prescription for other medications.

## 2022-07-06 ENCOUNTER — TELEPHONE (OUTPATIENT)
Dept: OBGYN CLINIC | Facility: CLINIC | Age: 22
End: 2022-07-06

## 2022-07-06 NOTE — TELEPHONE ENCOUNTER
Pt had chicken pox & had varicella vaccine. Partner has lesions on arm & buttocks. Advised to keep area covered but reassured pt she would be at risk if she did not previously have chicken pox. Discussed travel restrictions w/ pt.  Pt verbalized an understanding & agrees w/ plan

## 2022-07-07 ENCOUNTER — TELEPHONE (OUTPATIENT)
Dept: OBGYN CLINIC | Facility: CLINIC | Age: 22
End: 2022-07-07

## 2022-07-07 NOTE — TELEPHONE ENCOUNTER
notified pt of results per MJ. req fetal sex info to be sent as a [employer] message. Message sent.  Pt verbalized an understanding & agrees w/ plan

## 2022-07-21 ENCOUNTER — HOSPITAL ENCOUNTER (EMERGENCY)
Facility: HOSPITAL | Age: 22
Discharge: HOME | End: 2022-07-21
Payer: MEDICAID

## 2022-07-21 VITALS
HEART RATE: 59 BPM | DIASTOLIC BLOOD PRESSURE: 79 MMHG | TEMPERATURE: 98 F | BODY MASS INDEX: 24.84 KG/M2 | SYSTOLIC BLOOD PRESSURE: 110 MMHG | HEIGHT: 62 IN | WEIGHT: 135 LBS | RESPIRATION RATE: 18 BRPM | OXYGEN SATURATION: 99 %

## 2022-07-21 DIAGNOSIS — R55 SYNCOPE AND COLLAPSE: Primary | ICD-10-CM

## 2022-07-21 LAB
ANION GAP SERPL CALC-SCNC: 7 MMOL/L (ref 0–18)
BASOPHILS # BLD AUTO: 0.05 X10(3) UL (ref 0–0.2)
BASOPHILS NFR BLD AUTO: 0.6 %
BUN BLD-MCNC: 7 MG/DL (ref 7–18)
BUN/CREAT SERPL: 12.3 (ref 10–20)
CALCIUM BLD-MCNC: 8.3 MG/DL (ref 8.5–10.1)
CHLORIDE SERPL-SCNC: 105 MMOL/L (ref 98–112)
CO2 SERPL-SCNC: 25 MMOL/L (ref 21–32)
CREAT BLD-MCNC: 0.57 MG/DL
DEPRECATED RDW RBC AUTO: 38.3 FL (ref 35.1–46.3)
EOSINOPHIL # BLD AUTO: 0.17 X10(3) UL (ref 0–0.7)
EOSINOPHIL NFR BLD AUTO: 2 %
ERYTHROCYTE [DISTWIDTH] IN BLOOD BY AUTOMATED COUNT: 11.9 % (ref 11–15)
GLUCOSE BLD-MCNC: 108 MG/DL (ref 70–99)
HCT VFR BLD AUTO: 34.9 %
HGB BLD-MCNC: 11.9 G/DL
IMM GRANULOCYTES # BLD AUTO: 0.02 X10(3) UL (ref 0–1)
IMM GRANULOCYTES NFR BLD: 0.2 %
LYMPHOCYTES # BLD AUTO: 2.29 X10(3) UL (ref 1–4)
LYMPHOCYTES NFR BLD AUTO: 26.4 %
MCH RBC QN AUTO: 30.1 PG (ref 26–34)
MCHC RBC AUTO-ENTMCNC: 34.1 G/DL (ref 31–37)
MCV RBC AUTO: 88.4 FL
MONOCYTES # BLD AUTO: 0.47 X10(3) UL (ref 0.1–1)
MONOCYTES NFR BLD AUTO: 5.4 %
NEUTROPHILS # BLD AUTO: 5.66 X10 (3) UL (ref 1.5–7.7)
NEUTROPHILS # BLD AUTO: 5.66 X10(3) UL (ref 1.5–7.7)
NEUTROPHILS NFR BLD AUTO: 65.4 %
OSMOLALITY SERPL CALC.SUM OF ELEC: 283 MOSM/KG (ref 275–295)
PLATELET # BLD AUTO: 224 10(3)UL (ref 150–450)
POTASSIUM SERPL-SCNC: 3.4 MMOL/L (ref 3.5–5.1)
RBC # BLD AUTO: 3.95 X10(6)UL
SODIUM SERPL-SCNC: 137 MMOL/L (ref 136–145)
TROPONIN I HIGH SENSITIVITY: 5 NG/L
WBC # BLD AUTO: 8.7 X10(3) UL (ref 4–11)

## 2022-07-21 PROCEDURE — 99284 EMERGENCY DEPT VISIT MOD MDM: CPT

## 2022-07-21 PROCEDURE — 84484 ASSAY OF TROPONIN QUANT: CPT | Performed by: NURSE PRACTITIONER

## 2022-07-21 PROCEDURE — 85025 COMPLETE CBC W/AUTO DIFF WBC: CPT | Performed by: NURSE PRACTITIONER

## 2022-07-21 PROCEDURE — 96360 HYDRATION IV INFUSION INIT: CPT

## 2022-07-21 PROCEDURE — 93005 ELECTROCARDIOGRAM TRACING: CPT

## 2022-07-21 PROCEDURE — 80048 BASIC METABOLIC PNL TOTAL CA: CPT | Performed by: NURSE PRACTITIONER

## 2022-07-21 NOTE — ED INITIAL ASSESSMENT (HPI)
Pt 4 months gestation to ED with c/o syncopal episode at work today around 1700. Pt states her uniform felt tight and she was feeling hot. No medical Hx. Pt states she feels back to her baseline.   NAD

## 2022-07-26 ENCOUNTER — HOSPITAL ENCOUNTER (OUTPATIENT)
Age: 22
Discharge: HOME | End: 2022-07-26
Attending: EMERGENCY MEDICINE
Payer: MEDICAID

## 2022-07-26 ENCOUNTER — ROUTINE PRENATAL (OUTPATIENT)
Dept: OBGYN CLINIC | Facility: CLINIC | Age: 22
End: 2022-07-26
Payer: MEDICAID

## 2022-07-26 VITALS
HEART RATE: 88 BPM | RESPIRATION RATE: 20 BRPM | SYSTOLIC BLOOD PRESSURE: 115 MMHG | DIASTOLIC BLOOD PRESSURE: 71 MMHG | TEMPERATURE: 97 F | OXYGEN SATURATION: 97 %

## 2022-07-26 VITALS
HEART RATE: 83 BPM | BODY MASS INDEX: 23 KG/M2 | DIASTOLIC BLOOD PRESSURE: 73 MMHG | WEIGHT: 128 LBS | SYSTOLIC BLOOD PRESSURE: 120 MMHG

## 2022-07-26 DIAGNOSIS — J06.9 VIRAL URI: Primary | ICD-10-CM

## 2022-07-26 DIAGNOSIS — Z34.02 ENCOUNTER FOR SUPERVISION OF NORMAL FIRST PREGNANCY IN SECOND TRIMESTER: Primary | ICD-10-CM

## 2022-07-26 DIAGNOSIS — Z3A.16 16 WEEKS GESTATION OF PREGNANCY: ICD-10-CM

## 2022-07-26 LAB
APPEARANCE: CLEAR
BILIRUBIN: NEGATIVE
GLUCOSE (URINE DIPSTICK): NEGATIVE MG/DL
KETONES (URINE DIPSTICK): NEGATIVE MG/DL
LEUKOCYTES: NEGATIVE
MULTISTIX LOT#: NORMAL NUMERIC
NITRITE, URINE: NEGATIVE
OCCULT BLOOD: NEGATIVE
PH, URINE: 6 (ref 4.5–8)
PROTEIN (URINE DIPSTICK): NEGATIVE MG/DL
S PYO AG THROAT QL: NEGATIVE
SARS-COV-2 RNA RESP QL NAA+PROBE: NOT DETECTED
SPECIFIC GRAVITY: 1.01 (ref 1–1.03)
URINE-COLOR: YELLOW
UROBILINOGEN,SEMI-QN: 0.2 MG/DL (ref 0–1.9)

## 2022-07-26 PROCEDURE — 99212 OFFICE O/P EST SF 10 MIN: CPT

## 2022-07-26 PROCEDURE — 0502F SUBSEQUENT PRENATAL CARE: CPT | Performed by: ADVANCED PRACTICE MIDWIFE

## 2022-07-26 PROCEDURE — 87880 STREP A ASSAY W/OPTIC: CPT

## 2022-07-26 PROCEDURE — 3074F SYST BP LT 130 MM HG: CPT | Performed by: ADVANCED PRACTICE MIDWIFE

## 2022-07-26 PROCEDURE — 81002 URINALYSIS NONAUTO W/O SCOPE: CPT | Performed by: ADVANCED PRACTICE MIDWIFE

## 2022-07-26 PROCEDURE — 3078F DIAST BP <80 MM HG: CPT | Performed by: ADVANCED PRACTICE MIDWIFE

## 2022-07-26 NOTE — ED INITIAL ASSESSMENT (HPI)
Patient reports sore throat since Friday, fever, congestion. t max 101.3. Patient is 4 months pregnant. + poor appetite. States she has had indirect covid exposure.

## 2022-07-26 NOTE — PROGRESS NOTES
Bro Durbin is a 25year old , at 15w4d, here for her return OB visit. Currently, she is feeling ok. Denies contractions, cramping and bleeding. Not feeling fetal movement yet. Reports congestion starting Friday. Her whole family is sick. She did a rapid COVID test which was negative. Denies fever, chills or flu like symptoms. Also reports she fainted at work on Thursday and Friday last week. She works as a  at [employer]. She did not hit her head or abdomen. Thinks she is not eating or drinking enough. Has no appetite. Also feels the uniform at work is too tight. Vital signs and weight reviewed  See flowsheets     Today's Assessment/Plan:   1. Pt instructed to get PCR COVID test and [employer] message us with the results. Discussed comfort measures. 2. Discussed setting an alarm on her phone to remind her to eat something every 2 hours. Discussed importance of drinking 8-10 8oz glasses of water per day. Discussed strategies to be intentional about eating. 3. Discussed her job and possible options. Pt to consider. 4. AFP offered and reviewed and pt declines  5. Anatomy US ordered and pt given number to schedule around 20w    Next visit: 4 weeks    Reviewed:   2nd trimester precautions and expectations   labor precautions  Danger signs  Prenatal visit schedule    Pt verbalized understanding. All questions answered.  No barriers to learning identified

## 2022-07-28 ENCOUNTER — TELEPHONE (OUTPATIENT)
Dept: OBGYN CLINIC | Facility: CLINIC | Age: 22
End: 2022-07-28

## 2022-07-28 ENCOUNTER — LAB ENCOUNTER (OUTPATIENT)
Dept: LAB | Facility: HOSPITAL | Age: 22
End: 2022-07-28
Attending: ADVANCED PRACTICE MIDWIFE
Payer: MEDICAID

## 2022-07-28 ENCOUNTER — LAB ENCOUNTER (OUTPATIENT)
Dept: LAB | Age: 22
End: 2022-07-28
Attending: ADVANCED PRACTICE MIDWIFE
Payer: MEDICAID

## 2022-07-28 ENCOUNTER — HOSPITAL ENCOUNTER (INPATIENT)
Facility: HOSPITAL | Age: 22
LOS: 3 days | Discharge: HOME | End: 2022-07-31
Attending: OBSTETRICS & GYNECOLOGY | Admitting: OBSTETRICS & GYNECOLOGY
Payer: MEDICAID

## 2022-07-28 DIAGNOSIS — R39.9 UTI SYMPTOMS: ICD-10-CM

## 2022-07-28 DIAGNOSIS — N12 PYELONEPHRITIS: Primary | ICD-10-CM

## 2022-07-28 DIAGNOSIS — R09.81 NASAL CONGESTION: Primary | ICD-10-CM

## 2022-07-28 DIAGNOSIS — R09.81 NASAL CONGESTION: ICD-10-CM

## 2022-07-28 LAB
ANION GAP SERPL CALC-SCNC: 5 MMOL/L (ref 0–18)
B-HCG UR QL: POSITIVE
BASOPHILS # BLD AUTO: 0.05 X10(3) UL (ref 0–0.2)
BASOPHILS NFR BLD AUTO: 0.5 %
BILIRUB UR QL: NEGATIVE
BILIRUB UR QL: NEGATIVE
BUN BLD-MCNC: 5 MG/DL (ref 7–18)
BUN/CREAT SERPL: 7.7 (ref 10–20)
CALCIUM BLD-MCNC: 8.6 MG/DL (ref 8.5–10.1)
CHLORIDE SERPL-SCNC: 106 MMOL/L (ref 98–112)
CO2 SERPL-SCNC: 25 MMOL/L (ref 21–32)
COLOR UR: YELLOW
COLOR UR: YELLOW
CREAT BLD-MCNC: 0.65 MG/DL
DEPRECATED RDW RBC AUTO: 37.2 FL (ref 35.1–46.3)
EOSINOPHIL # BLD AUTO: 0.15 X10(3) UL (ref 0–0.7)
EOSINOPHIL NFR BLD AUTO: 1.5 %
ERYTHROCYTE [DISTWIDTH] IN BLOOD BY AUTOMATED COUNT: 11.9 % (ref 11–15)
GLUCOSE BLD-MCNC: 84 MG/DL (ref 70–99)
GLUCOSE UR-MCNC: NEGATIVE MG/DL
GLUCOSE UR-MCNC: NEGATIVE MG/DL
HCT VFR BLD AUTO: 34.4 %
HGB BLD-MCNC: 12.3 G/DL
IMM GRANULOCYTES # BLD AUTO: 0.03 X10(3) UL (ref 0–1)
IMM GRANULOCYTES NFR BLD: 0.3 %
KETONES UR-MCNC: NEGATIVE MG/DL
LYMPHOCYTES # BLD AUTO: 1.88 X10(3) UL (ref 1–4)
LYMPHOCYTES NFR BLD AUTO: 18.9 %
MCH RBC QN AUTO: 30.9 PG (ref 26–34)
MCHC RBC AUTO-ENTMCNC: 35.8 G/DL (ref 31–37)
MCV RBC AUTO: 86.4 FL
MONOCYTES # BLD AUTO: 0.77 X10(3) UL (ref 0.1–1)
MONOCYTES NFR BLD AUTO: 7.8 %
NEUTROPHILS # BLD AUTO: 7.05 X10 (3) UL (ref 1.5–7.7)
NEUTROPHILS # BLD AUTO: 7.05 X10(3) UL (ref 1.5–7.7)
NEUTROPHILS NFR BLD AUTO: 71 %
NITRITE UR QL STRIP.AUTO: NEGATIVE
NITRITE UR QL STRIP.AUTO: POSITIVE
OSMOLALITY SERPL CALC.SUM OF ELEC: 278 MOSM/KG (ref 275–295)
PH UR: 6 [PH] (ref 5–8)
PH UR: 7 [PH] (ref 5–8)
PLATELET # BLD AUTO: 207 10(3)UL (ref 150–450)
POTASSIUM SERPL-SCNC: 3.6 MMOL/L (ref 3.5–5.1)
PROT UR-MCNC: >=500 MG/DL
RBC # BLD AUTO: 3.98 X10(6)UL
RBC #/AREA URNS AUTO: >10 /HPF
SARS-COV-2 RNA RESP QL NAA+PROBE: NOT DETECTED
SODIUM SERPL-SCNC: 136 MMOL/L (ref 136–145)
SP GR UR STRIP: 1.02 (ref 1–1.03)
SP GR UR STRIP: 1.02 (ref 1–1.03)
UROBILINOGEN UR STRIP-ACNC: 1
UROBILINOGEN UR STRIP-ACNC: 2
VIT C UR-MCNC: NEGATIVE MG/DL
WBC # BLD AUTO: 9.9 X10(3) UL (ref 4–11)
WBC #/AREA URNS AUTO: >50 /HPF
WBC CLUMPS UR QL AUTO: PRESENT /HPF

## 2022-07-28 PROCEDURE — 81001 URINALYSIS AUTO W/SCOPE: CPT

## 2022-07-28 PROCEDURE — 87086 URINE CULTURE/COLONY COUNT: CPT

## 2022-07-28 PROCEDURE — 87186 SC STD MICRODIL/AGAR DIL: CPT

## 2022-07-28 PROCEDURE — 87077 CULTURE AEROBIC IDENTIFY: CPT

## 2022-07-28 RX ORDER — ONDANSETRON 2 MG/ML
4 INJECTION INTRAMUSCULAR; INTRAVENOUS ONCE
Status: COMPLETED | OUTPATIENT
Start: 2022-07-28 | End: 2022-07-28

## 2022-07-28 RX ORDER — ACETAMINOPHEN 325 MG/1
650 TABLET ORAL EVERY 6 HOURS PRN
Status: DISCONTINUED | OUTPATIENT
Start: 2022-07-28 | End: 2022-07-31

## 2022-07-28 RX ORDER — ACETAMINOPHEN 325 MG/1
650 TABLET ORAL EVERY 6 HOURS PRN
Status: DISCONTINUED | OUTPATIENT
Start: 2022-07-28 | End: 2022-07-28

## 2022-07-28 RX ORDER — SODIUM CHLORIDE, SODIUM LACTATE, POTASSIUM CHLORIDE, CALCIUM CHLORIDE 600; 310; 30; 20 MG/100ML; MG/100ML; MG/100ML; MG/100ML
INJECTION, SOLUTION INTRAVENOUS CONTINUOUS
Status: DISCONTINUED | OUTPATIENT
Start: 2022-07-28 | End: 2022-07-31

## 2022-07-28 NOTE — TELEPHONE ENCOUNTER
Spoke with pt and advised of negative ISH urine culture per MB. Pt reports she is still not feeling well. Pt states she has burning with urination, right sided lower back pain, a temp of 99.8, and vomiting. MB notified. Pt advised per MB, she is to go to lab to repeat urine culture, UA, and complete Covid PCR test. Phone number for central scheduling given to pt. Pt agreed and voiced understanding.

## 2022-07-28 NOTE — TELEPHONE ENCOUNTER
Patient's pain on her right side is increasing. It is a 7 out of 10. Wondering if she should go to the ER. Please advise.

## 2022-07-28 NOTE — ED QUICK NOTES
Patient to ED for complaints of right sided flank pain and urinary symptoms-seen here for same complaints on Tuesday-urine was clean. Seen again today by midwife and told she has UTI and to come to ED. Pt complaining of fevers and chills, nausea and vomiting. 16 weeks pregnant; denies vaginal complaints.

## 2022-07-28 NOTE — TELEPHONE ENCOUNTER
Spoke with pt and advised per MB she is to go to ER for evaluation. Pt agreed and voiced understanding.

## 2022-07-28 NOTE — TELEPHONE ENCOUNTER
Her test of cure was negative you can let her know that. If she becomes symptomatic again she should let us know.

## 2022-07-28 NOTE — ED INITIAL ASSESSMENT (HPI)
Patient c/o burning while urinating and frequency with right flank pain that has been going on since Thursday. Intermittent nausea, vomiting and fever. PCP advised patient to come to ER.  Patent is 16 weeks pregnant

## 2022-07-29 LAB — SARS-COV-2 RNA RESP QL NAA+PROBE: NOT DETECTED

## 2022-07-29 PROCEDURE — 99223 1ST HOSP IP/OBS HIGH 75: CPT | Performed by: OBSTETRICS & GYNECOLOGY

## 2022-07-29 NOTE — PLAN OF CARE
Problem: Patient Centered Care  Goal: Patient preferences are identified and integrated in the patient's plan of care  Description: Interventions:  - What would you like us to know as we care for you?  I am 16 weeks pregnant  - Provide timely, complete, and accurate information to patient/family  - Incorporate patient and family knowledge, values, beliefs, and cultural backgrounds into the planning and delivery of care  - Encourage patient/family to participate in care and decision-making at the level they choose  - Honor patient and family perspectives and choices  Outcome: Progressing     Problem: Patient/Family Goals  Goal: Patient/Family Long Term Goal  Description: Patient's Long Term Goal: go home    Interventions:  - medications  -diagnostics  -hydration  - See additional Care Plan goals for specific interventions  Outcome: Progressing  Goal: Patient/Family Short Term Goal  Description: Patient's Short Term Goal: resolve right flank pain    Interventions:   - medications  -ice/hot packs for comfort  - See additional Care Plan goals for specific interventions  Outcome: Progressing     Problem: PAIN - ADULT  Goal: Verbalizes/displays adequate comfort level or patient's stated pain goal  Description: INTERVENTIONS:  - Encourage pt to monitor pain and request assistance  - Assess pain using appropriate pain scale  - Administer analgesics based on type and severity of pain and evaluate response  - Implement non-pharmacological measures as appropriate and evaluate response  - Consider cultural and social influences on pain and pain management  - Manage/alleviate anxiety  - Utilize distraction and/or relaxation techniques  - Monitor for opioid side effects  - Notify MD/LIP if interventions unsuccessful or patient reports new pain  - Anticipate increased pain with activity and pre-medicate as appropriate  Outcome: Progressing     Problem: DISCHARGE PLANNING  Goal: Discharge to home or other facility with appropriate resources  Description: INTERVENTIONS:  - Identify barriers to discharge w/pt and caregiver  - Include patient/family/discharge partner in discharge planning  - Arrange for needed discharge resources and transportation as appropriate  - Identify discharge learning needs (meds, wound care, etc)  - Arrange for interpreters to assist at discharge as needed  - Consider post-discharge preferences of patient/family/discharge partner  - Complete POLST form as appropriate  - Assess patient's ability to be responsible for managing their own health  - Refer to Case Management Department for coordinating discharge planning if the patient needs post-hospital services based on physician/LIP order or complex needs related to functional status, cognitive ability or social support system  Outcome: Progressing     Problem: ANTEPARTUM/LABOR and DELIVERY  Goal: Demonstrates ability to cope with hospitalization/illness  Description: INTERVENTIONS:  - Encourage verbalization of feelings/concerns/expectations  - Provide quiet environment  - Assist patient to identify own strengths and abilities  - Encourage patient to set small goals for self  - Encourage participation in diversional activity  - Reinforce positive adaptation of new coping behaviors  - Include patient/family/caregiver in decisions  Outcome: Progressing   Patient voiced some improvement in right flank pain and resolution of dysuria. Tylenol for pain. On IV antibiotic with no adverse reaction noted. Oral intake encouraged. Able to verbalize needs, call light in reach.

## 2022-07-29 NOTE — ED QUICK NOTES
Orders for admission, patient is aware of plan and ready to go upstairs. Any questions, please call ED RN Loreto Kohli at extension [phone].      Patient Covid vaccination status: Partially vaccinated     COVID Test Ordered in ED: Rapid SARS-CoV-2 by PCR    COVID Suspicion at Admission: Low clinical suspicion for COVID    Running Infusions:      Mental Status/LOC at time of transport: aox4    Other pertinent information:   CIWA score: N/A   NIH score:  N/A

## 2022-07-29 NOTE — PROGRESS NOTES
Pt admitted from ED. 16 weeks pregnant. AxO. General diet. Dr. David De La Rosa called for admit orders. IV antibiotics and IVF. Denies nausea. C/o continued flank pain which pt states slightly improved with antibiotics and fluids. Plan tbd.

## 2022-07-30 NOTE — PLAN OF CARE
Problem: Patient Centered Care  Goal: Patient preferences are identified and integrated in the patient's plan of care  Description: Interventions:  - What would you like us to know as we care for you?  I am 16 weeks pregnant  - Provide timely, complete, and accurate information to patient/family  - Incorporate patient and family knowledge, values, beliefs, and cultural backgrounds into the planning and delivery of care  - Encourage patient/family to participate in care and decision-making at the level they choose  - Honor patient and family perspectives and choices  Outcome: Progressing     Problem: Patient/Family Goals  Goal: Patient/Family Long Term Goal  Description: Patient's Long Term Goal: go home    Interventions:  - medications  -diagnostics  -hydration  - See additional Care Plan goals for specific interventions  Outcome: Progressing  Goal: Patient/Family Short Term Goal  Description: Patient's Short Term Goal: resolve right flank pain    Interventions:   - medications  -ice/hot packs for comfort  - See additional Care Plan goals for specific interventions  Outcome: Progressing     Problem: PAIN - ADULT  Goal: Verbalizes/displays adequate comfort level or patient's stated pain goal  Description: INTERVENTIONS:  - Encourage pt to monitor pain and request assistance  - Assess pain using appropriate pain scale  - Administer analgesics based on type and severity of pain and evaluate response  - Implement non-pharmacological measures as appropriate and evaluate response  - Consider cultural and social influences on pain and pain management  - Manage/alleviate anxiety  - Utilize distraction and/or relaxation techniques  - Monitor for opioid side effects  - Notify MD/LIP if interventions unsuccessful or patient reports new pain  - Anticipate increased pain with activity and pre-medicate as appropriate  Outcome: Progressing     Problem: DISCHARGE PLANNING  Goal: Discharge to home or other facility with appropriate resources  Description: INTERVENTIONS:  - Identify barriers to discharge w/pt and caregiver  - Include patient/family/discharge partner in discharge planning  - Arrange for needed discharge resources and transportation as appropriate  - Identify discharge learning needs (meds, wound care, etc)  - Arrange for interpreters to assist at discharge as needed  - Consider post-discharge preferences of patient/family/discharge partner  - Complete POLST form as appropriate  - Assess patient's ability to be responsible for managing their own health  - Refer to Case Management Department for coordinating discharge planning if the patient needs post-hospital services based on physician/LIP order or complex needs related to functional status, cognitive ability or social support system  Outcome: Progressing     Problem: ANTEPARTUM/LABOR and DELIVERY  Goal: Demonstrates ability to cope with hospitalization/illness  Description: INTERVENTIONS:  - Encourage verbalization of feelings/concerns/expectations  - Provide quiet environment  - Assist patient to identify own strengths and abilities  - Encourage patient to set small goals for self  - Encourage participation in diversional activity  - Reinforce positive adaptation of new coping behaviors  - Include patient/family/caregiver in decisions  Outcome: Progressing     0515 patient complaining of kidney hurting as it did when she was first admitted. Frequent and painful urination. [facility] on call MD was paged and notified, who gave no new orders and instructed to give tylenol and that they would round on her to see patient. Pt alert and oriented. Tolerating diet. Ambulates independently. Daily fetal heart tones. Denies pain/discomfort. IV abx. Call light within reach. Bed in lowest and locked position. Plan for home when cleared.

## 2022-07-30 NOTE — PLAN OF CARE
Problem: Patient Centered Care  Goal: Patient preferences are identified and integrated in the patient's plan of care  Description: Interventions:  - What would you like us to know as we care for you?  I am 16 weeks pregnant  - Provide timely, complete, and accurate information to patient/family  - Incorporate patient and family knowledge, values, beliefs, and cultural backgrounds into the planning and delivery of care  - Encourage patient/family to participate in care and decision-making at the level they choose  - Honor patient and family perspectives and choices  Outcome: Progressing     Problem: Patient/Family Goals  Goal: Patient/Family Long Term Goal  Description: Patient's Long Term Goal: go home    Interventions:  - medications  -diagnostics  -hydration  - See additional Care Plan goals for specific interventions  Outcome: Progressing  Goal: Patient/Family Short Term Goal  Description: Patient's Short Term Goal: resolve right flank pain    Interventions:   - medications  -ice/hot packs for comfort  - See additional Care Plan goals for specific interventions  Outcome: Progressing     Problem: PAIN - ADULT  Goal: Verbalizes/displays adequate comfort level or patient's stated pain goal  Description: INTERVENTIONS:  - Encourage pt to monitor pain and request assistance  - Assess pain using appropriate pain scale  - Administer analgesics based on type and severity of pain and evaluate response  - Implement non-pharmacological measures as appropriate and evaluate response  - Consider cultural and social influences on pain and pain management  - Manage/alleviate anxiety  - Utilize distraction and/or relaxation techniques  - Monitor for opioid side effects  - Notify MD/LIP if interventions unsuccessful or patient reports new pain  - Anticipate increased pain with activity and pre-medicate as appropriate  Outcome: Progressing     Problem: DISCHARGE PLANNING  Goal: Discharge to home or other facility with appropriate resources  Description: INTERVENTIONS:  - Identify barriers to discharge w/pt and caregiver  - Include patient/family/discharge partner in discharge planning  - Arrange for needed discharge resources and transportation as appropriate  - Identify discharge learning needs (meds, wound care, etc)  - Arrange for interpreters to assist at discharge as needed  - Consider post-discharge preferences of patient/family/discharge partner  - Complete POLST form as appropriate  - Assess patient's ability to be responsible for managing their own health  - Refer to Case Management Department for coordinating discharge planning if the patient needs post-hospital services based on physician/LIP order or complex needs related to functional status, cognitive ability or social support system  Outcome: Progressing     Problem: ANTEPARTUM/LABOR and DELIVERY  Goal: Demonstrates ability to cope with hospitalization/illness  Description: INTERVENTIONS:  - Encourage verbalization of feelings/concerns/expectations  - Provide quiet environment  - Assist patient to identify own strengths and abilities  - Encourage patient to set small goals for self  - Encourage participation in diversional activity  - Reinforce positive adaptation of new coping behaviors  - Include patient/family/caregiver in decisions  Outcome: Progressing  Patient voices some flank pain but declines pain medication. Fetal tones completed by [facility] [place] staff. On IV fluids as prescribed. Able to verbalize needs, call light in reach, bed in low position. Care endorsed to [facility] for duration of shift.

## 2022-07-31 VITALS
OXYGEN SATURATION: 100 % | DIASTOLIC BLOOD PRESSURE: 68 MMHG | HEIGHT: 62 IN | RESPIRATION RATE: 20 BRPM | WEIGHT: 127 LBS | BODY MASS INDEX: 23.37 KG/M2 | TEMPERATURE: 98 F | SYSTOLIC BLOOD PRESSURE: 107 MMHG | HEART RATE: 65 BPM

## 2022-07-31 PROCEDURE — 99239 HOSP IP/OBS DSCHRG MGMT >30: CPT | Performed by: OBSTETRICS & GYNECOLOGY

## 2022-07-31 RX ORDER — CEPHALEXIN 500 MG/1
500 CAPSULE ORAL EVERY 8 HOURS SCHEDULED
Status: DISCONTINUED | OUTPATIENT
Start: 2022-07-31 | End: 2022-07-31

## 2022-07-31 RX ORDER — CEPHALEXIN 500 MG/1
500 CAPSULE ORAL 3 TIMES DAILY
Qty: 30 CAPSULE | Refills: 0 | Status: SHIPPED | OUTPATIENT
Start: 2022-07-31

## 2022-07-31 NOTE — PLAN OF CARE
Problem: Patient Centered Care  Goal: Patient preferences are identified and integrated in the patient's plan of care  Description: Interventions:  - What would you like us to know as we care for you?  I am 16 weeks pregnant  - Provide timely, complete, and accurate information to patient/family  - Incorporate patient and family knowledge, values, beliefs, and cultural backgrounds into the planning and delivery of care  - Encourage patient/family to participate in care and decision-making at the level they choose  - Honor patient and family perspectives and choices  Outcome: Progressing     Problem: Patient/Family Goals  Goal: Patient/Family Long Term Goal  Description: Patient's Long Term Goal: go home    Interventions:  - medications  -diagnostics  -hydration  - See additional Care Plan goals for specific interventions  Outcome: Progressing  Goal: Patient/Family Short Term Goal  Description: Patient's Short Term Goal: resolve right flank pain    Interventions:   - medications  -ice/hot packs for comfort  - See additional Care Plan goals for specific interventions  Outcome: Progressing     Problem: PAIN - ADULT  Goal: Verbalizes/displays adequate comfort level or patient's stated pain goal  Description: INTERVENTIONS:  - Encourage pt to monitor pain and request assistance  - Assess pain using appropriate pain scale  - Administer analgesics based on type and severity of pain and evaluate response  - Implement non-pharmacological measures as appropriate and evaluate response  - Consider cultural and social influences on pain and pain management  - Manage/alleviate anxiety  - Utilize distraction and/or relaxation techniques  - Monitor for opioid side effects  - Notify MD/LIP if interventions unsuccessful or patient reports new pain  - Anticipate increased pain with activity and pre-medicate as appropriate  Outcome: Progressing     Problem: DISCHARGE PLANNING  Goal: Discharge to home or other facility with appropriate resources  Description: INTERVENTIONS:  - Identify barriers to discharge w/pt and caregiver  - Include patient/family/discharge partner in discharge planning  - Arrange for needed discharge resources and transportation as appropriate  - Identify discharge learning needs (meds, wound care, etc)  - Arrange for interpreters to assist at discharge as needed  - Consider post-discharge preferences of patient/family/discharge partner  - Complete POLST form as appropriate  - Assess patient's ability to be responsible for managing their own health  - Refer to Case Management Department for coordinating discharge planning if the patient needs post-hospital services based on physician/LIP order or complex needs related to functional status, cognitive ability or social support system  Outcome: Progressing     Problem: ANTEPARTUM/LABOR and DELIVERY  Goal: Demonstrates ability to cope with hospitalization/illness  Description: INTERVENTIONS:  - Encourage verbalization of feelings/concerns/expectations  - Provide quiet environment  - Assist patient to identify own strengths and abilities  - Encourage patient to set small goals for self  - Encourage participation in diversional activity  - Reinforce positive adaptation of new coping behaviors  - Include patient/family/caregiver in decisions  Outcome: Skippy Goad is stable, denies any pain at this time. Breathing on room air. tolerated diet well. Able to ambulate. Safety measures applied and reviewed, Non skid socks in use. bed and chair exit alarm is in use,call light within reach. Bed is in lowest position. Being discharge home. Prescriptions sent to pharmacy. All discharge info reviewed.

## 2022-07-31 NOTE — DISCHARGE SUMMARY
[facility]    Discharge Summary    Lalo Levine Patient Status:  Inpatient    2000 MRN [id number]   Location [facility] 4W/SW/SE Attending Benjie Pal MD   Hosp Day # 3 PCP Sherri Barnett MD     Date of Admission: 2022   Date of Discharge: 2022    Admitting Diagnosis: Pyelonephritis [N12]    Disposition: Home    Discharge Diagnosis: . Principal Problem:    Pyelonephritis      Hospital Course:   Reason for Admission: Pyelonephritis    Discharge Physical Exam: General appearance:  alert, appears stated age and cooperative  Pulmonary: clear to auscultation bilaterally  Breasts:  normal appearance, no masses or tenderness  Cardiovascular: S1, S2 normal, no murmur, click, rub or gallop, regular rate and rhythm  Abdominal: soft, non-tender; bowel sounds normal; no masses,  no organomegaly  Extremities: extremities normal, atraumatic, no cyanosis or edema    Hospital Course: S/P IV Rocephin 1 24 hours for 48 hours. Patient remains afebrile. Feeling much better. No flank pain. To complete 10 days of Keflex 500 mg TID. Urine Culture grew E. Coli that is sensitive to Keflex. Complications: None            Discharge Plan:   Discharge Condition: Stable    Current Discharge Medication List    Home Meds - Modified    cephalexin 500 MG Oral Cap  Take 1 capsule (500 mg total) by mouth 3 (three) times daily. Home Meds - Unchanged    PRENAT VIT-FEPOLY-METHYLFOL-FA OR  Take by mouth. Discharge Diet: General diet    Discharge Activity: As tolerated    Follow up: Follow-up Information     Schedule an appointment as soon as possible for a visit in 1 week to follow up. Why: Midwife office                               Other Discharge Instructions: Follow up with Midwife office in 1-2 weeks        Claudio De La Rosa MD  2022

## 2022-08-11 ENCOUNTER — ROUTINE PRENATAL (OUTPATIENT)
Dept: OBGYN CLINIC | Facility: CLINIC | Age: 22
End: 2022-08-11
Payer: MEDICAID

## 2022-08-11 VITALS
HEART RATE: 67 BPM | DIASTOLIC BLOOD PRESSURE: 77 MMHG | WEIGHT: 133 LBS | SYSTOLIC BLOOD PRESSURE: 110 MMHG | BODY MASS INDEX: 24 KG/M2

## 2022-08-11 DIAGNOSIS — Z34.02 ENCOUNTER FOR SUPERVISION OF NORMAL FIRST PREGNANCY IN SECOND TRIMESTER: Primary | ICD-10-CM

## 2022-08-11 DIAGNOSIS — N76.0 VAGINITIS AND VULVOVAGINITIS: ICD-10-CM

## 2022-08-11 PROBLEM — O23.00 PYELONEPHRITIS DURING PREGNANCY: Status: ACTIVE | Noted: 2022-07-28

## 2022-08-11 PROBLEM — O23.00: Status: ACTIVE | Noted: 2022-07-28

## 2022-08-11 PROCEDURE — 0502F SUBSEQUENT PRENATAL CARE: CPT | Performed by: ADVANCED PRACTICE MIDWIFE

## 2022-08-11 PROCEDURE — 3078F DIAST BP <80 MM HG: CPT | Performed by: ADVANCED PRACTICE MIDWIFE

## 2022-08-11 PROCEDURE — 3074F SYST BP LT 130 MM HG: CPT | Performed by: ADVANCED PRACTICE MIDWIFE

## 2022-08-11 RX ORDER — CEPHALEXIN 500 MG/1
500 CAPSULE ORAL NIGHTLY
Qty: 90 CAPSULE | Refills: 1 | Status: SHIPPED | OUTPATIENT
Start: 2022-08-11 | End: 2022-12-09

## 2022-08-11 NOTE — PROGRESS NOTES
Jhoan Diaz is a 25year old , at [address], here for her return OB visit. Currently, she is feeling well. Denies contractions, cramping, bleeding and leakage of fluid. Not feeling fetal movement yet. Thinks she got a yeast infection from all of the antibiotics because she is vulvar itching and irritation and thick white vaginal discharge. Reports the same thing happened to her last time she took antibiotics for a UTI and the symptoms are the same. Vital signs and weight reviewed  See flowsheets  Labia majora red and irritated bilaterally  Thick white vaginal discharge present, vaginal culture collected     Today's Assessment/Plan:   1. Rx sent for Terazol 7 for yeast infection treatment  2. Suppressive therapy for pyelo sent. Warning signs reviewed. Discussed importance of good hydration, hygiene, etc.  3. Pt has anatomy US scheduled for     Next visit: Follow up OB: 4 weeks    Reviewed:   2nd trimester precautions and expectations   labor precautions  Danger signs      Pt verbalized understanding. All questions answered.  No barriers to learning identified

## 2022-08-12 ENCOUNTER — TELEPHONE (OUTPATIENT)
Dept: OBGYN CLINIC | Facility: CLINIC | Age: 22
End: 2022-08-12

## 2022-08-12 NOTE — TELEPHONE ENCOUNTER
Pt was seen yesterday and the script was never sent to the pharmacy , Pt is asking for ti to be sent ,

## 2022-08-12 NOTE — TELEPHONE ENCOUNTER
Pt sates pharmacy only received keflex not terconazole. Advised it was received by the pharmacy at 216p yesterday. I will call pharmacy & confirm.  Pt verbalized an understanding    Lm on pharmacy vm w/ rx & directions

## 2022-08-13 LAB
GENITAL VAGINOSIS SCREEN: NEGATIVE
TRICHOMONAS SCREEN: NEGATIVE

## 2022-08-29 ENCOUNTER — HOSPITAL ENCOUNTER (OUTPATIENT)
Dept: PERINATAL CARE | Facility: HOSPITAL | Age: 22
End: 2022-08-29
Attending: ADVANCED PRACTICE MIDWIFE
Payer: MEDICAID

## 2022-08-29 ENCOUNTER — HOSPITAL ENCOUNTER (OUTPATIENT)
Dept: PERINATAL CARE | Facility: HOSPITAL | Age: 22
Discharge: HOME | End: 2022-08-29
Attending: OBSTETRICS & GYNECOLOGY
Payer: MEDICAID

## 2022-08-29 VITALS
BODY MASS INDEX: 24 KG/M2 | SYSTOLIC BLOOD PRESSURE: 107 MMHG | DIASTOLIC BLOOD PRESSURE: 66 MMHG | WEIGHT: 130 LBS | HEART RATE: 64 BPM

## 2022-08-29 DIAGNOSIS — Z36.89 ENCOUNTER FOR FETAL ANATOMIC SURVEY: ICD-10-CM

## 2022-08-29 DIAGNOSIS — Z36.89 ENCOUNTER FOR FETAL ANATOMIC SURVEY: Primary | ICD-10-CM

## 2022-08-29 PROCEDURE — 76805 OB US >/= 14 WKS SNGL FETUS: CPT | Performed by: OBSTETRICS & GYNECOLOGY

## 2022-08-29 NOTE — PROGRESS NOTES
OB ULTRASOUND REPORT    Encounter for a level I ultrasound at the request of Ms. Kevin Bhandari. See imaging tab for complete ultrasound report or in PACS    Ultrasound Findings:  Single IUP in breech presentation. Placenta is posterior. A 3 vessel cord is noted. Cardiac activity is present at 137 bpm   g ( 0 lb 12 oz);    MVP is 4.6 cm . Fetal Anatomy:  Visualized with normal appearance: head, face, spine, neck, skin, chest, abdominal wall, gastrointestinal tract, kidneys, bladder, extremities. Brain: Visualized and normal appearances: brain parenchyma, cerebral ventricles, choroid plexus, Cisterna Magna, midline falx, cerebellum, cerebellar lobes, posterior fossa, vermis, cavum septi pellucidi. Face: eyes normal, profile normal, nose normal, lip normal, palate normal.  Heart: visualized and normal appearance: 3 vessel view, four-chamber, left outflow tract, right outflow tract, arches. Genetic Sonogram:  Nuchal fold - normal    Pylelectasis - absent    Bowel - Not hyperechogenic     Echogenic intracardiac foci - absent   Nasal bone - present   Choroid plexus cyst - absent    Summary of Ultrasound findings: This is a Williamson pregnancy. The fetal measurements are consistent with established EDC. No gross ultrasound evidence of structural abnormalities are seen today. No minor markers for aneuploidy are seen. The patient understands that ultrasound cannot rule out all structural and chromosomal abnormalities. IMPRESSION:  1.  IUP at 20w3d  2. Normal fetal anatomy  3. Biometric measurements are consistent with dates    This was an ultrasound only encounter (no physician visit). The ultrasound was read by Dr. Rosendo Shearer and the report was sent to Ms. Whittington to discuss with the patient.

## 2022-08-30 ENCOUNTER — ROUTINE PRENATAL (OUTPATIENT)
Dept: OBGYN CLINIC | Facility: CLINIC | Age: 22
End: 2022-08-30
Payer: MEDICAID

## 2022-08-30 VITALS
WEIGHT: 132 LBS | DIASTOLIC BLOOD PRESSURE: 68 MMHG | HEART RATE: 72 BPM | SYSTOLIC BLOOD PRESSURE: 105 MMHG | BODY MASS INDEX: 24 KG/M2

## 2022-08-30 DIAGNOSIS — R39.9 UTI SYMPTOMS: ICD-10-CM

## 2022-08-30 DIAGNOSIS — Z34.01 ENCOUNTER FOR SUPERVISION OF NORMAL FIRST PREGNANCY IN FIRST TRIMESTER: ICD-10-CM

## 2022-08-30 DIAGNOSIS — Z11.3 SCREEN FOR STD (SEXUALLY TRANSMITTED DISEASE): Primary | ICD-10-CM

## 2022-08-30 PROCEDURE — 87086 URINE CULTURE/COLONY COUNT: CPT | Performed by: ADVANCED PRACTICE MIDWIFE

## 2022-08-30 PROCEDURE — 87491 CHLMYD TRACH DNA AMP PROBE: CPT | Performed by: ADVANCED PRACTICE MIDWIFE

## 2022-08-30 PROCEDURE — 87591 N.GONORRHOEAE DNA AMP PROB: CPT | Performed by: ADVANCED PRACTICE MIDWIFE

## 2022-08-30 NOTE — PROGRESS NOTES
Reports FM. Denies any cramping, vaginal bleeding or leakage of fluid. Reports some pressure when urinating. Urine culture and GC/Chl sent. ultrasound reviewed with patient.   Warning signs reviewed and all questions answered

## 2022-08-31 LAB
C TRACH DNA SPEC QL NAA+PROBE: NEGATIVE
N GONORRHOEA DNA SPEC QL NAA+PROBE: NEGATIVE

## 2022-09-02 ENCOUNTER — TELEPHONE (OUTPATIENT)
Dept: OBGYN CLINIC | Facility: CLINIC | Age: 22
End: 2022-09-02

## 2022-09-02 NOTE — TELEPHONE ENCOUNTER
----- Message from Mariia Bustamante CNM sent at 9/2/2022  8:52 AM CDT -----  Urine culture normal pressure is most likely from the growing pregnancy

## 2022-09-09 ENCOUNTER — TELEPHONE (OUTPATIENT)
Dept: OBGYN CLINIC | Facility: CLINIC | Age: 22
End: 2022-09-09

## 2022-09-09 NOTE — TELEPHONE ENCOUNTER
Tommy Landaverde CNM  [facility] Clinical Pool  Please notify patient of normal anatomy ultrasound results and placenta location. Spoke with pt who states anatomy scan was already discussed with her during her last PN appt.

## 2022-09-27 ENCOUNTER — ROUTINE PRENATAL (OUTPATIENT)
Dept: OBGYN CLINIC | Facility: CLINIC | Age: 22
End: 2022-09-27

## 2022-09-27 VITALS
SYSTOLIC BLOOD PRESSURE: 113 MMHG | HEART RATE: 60 BPM | WEIGHT: 136 LBS | BODY MASS INDEX: 25 KG/M2 | DIASTOLIC BLOOD PRESSURE: 73 MMHG

## 2022-09-27 DIAGNOSIS — Z34.90 PREGNANCY, UNSPECIFIED GESTATIONAL AGE: Primary | ICD-10-CM

## 2022-09-27 PROCEDURE — 3078F DIAST BP <80 MM HG: CPT | Performed by: ADVANCED PRACTICE MIDWIFE

## 2022-09-27 PROCEDURE — 0502F SUBSEQUENT PRENATAL CARE: CPT | Performed by: ADVANCED PRACTICE MIDWIFE

## 2022-09-27 PROCEDURE — 3074F SYST BP LT 130 MM HG: CPT | Performed by: ADVANCED PRACTICE MIDWIFE

## 2022-10-11 ENCOUNTER — ROUTINE PRENATAL (OUTPATIENT)
Facility: CLINIC | Age: 22
End: 2022-10-11
Payer: MEDICAID

## 2022-10-11 VITALS
SYSTOLIC BLOOD PRESSURE: 114 MMHG | BODY MASS INDEX: 26 KG/M2 | HEART RATE: 80 BPM | DIASTOLIC BLOOD PRESSURE: 72 MMHG | WEIGHT: 142 LBS

## 2022-10-11 DIAGNOSIS — Z34.02 ENCOUNTER FOR SUPERVISION OF NORMAL FIRST PREGNANCY IN SECOND TRIMESTER: Primary | ICD-10-CM

## 2022-10-11 DIAGNOSIS — N89.8 VAGINAL IRRITATION: ICD-10-CM

## 2022-10-11 PROCEDURE — 0502F SUBSEQUENT PRENATAL CARE: CPT | Performed by: ADVANCED PRACTICE MIDWIFE

## 2022-10-11 PROCEDURE — 3078F DIAST BP <80 MM HG: CPT | Performed by: ADVANCED PRACTICE MIDWIFE

## 2022-10-11 PROCEDURE — 3074F SYST BP LT 130 MM HG: CPT | Performed by: ADVANCED PRACTICE MIDWIFE

## 2022-10-11 RX ORDER — FLUCONAZOLE 150 MG/1
150 TABLET ORAL ONCE
Qty: 1 TABLET | Refills: 0 | Status: SHIPPED | OUTPATIENT
Start: 2022-10-11 | End: 2022-10-11

## 2022-10-11 NOTE — PROGRESS NOTES
Castillo Hobbs is a 25year old , at 30w1d, here for her return OB visit. Currently, she is feeling ok. Denies contractions, bleeding and leakage of fluid. Endorses active fetus. Today she has concern for skin in her groin area and on vulva burning and peeling for about a month. It is so painful that she isn't able to wear underwear. Denies any rash prior to the burning & peeling. Denies any change in soaps or detergents. She has been taking the keflex for suppression daily. Denies any urinary frequency, urgency or dysuria but does feel like she has been leaking small amounts of urine for the past month when she coughs, laughs or sneezes with a full bladder. Denies any abnormal discharge. Reports her discharge is milky white but does burn the irritated areas of her skin when it touches. She denies any sexual activity since before this started. She is taking a daily probiotic supplement. Vital signs and weight reviewed  See flowsheets  External vulvar area into the sides of her legs is bright pink and irritated. No peeling skin noted. Internal vulva appears WNL. Physiologic leukorrhea noted in vaginal vault. Vaginosis swab collected. Today's Assessment/Plan:   1. Rx sent for triamcinolone steroid ointment 2x/day x 7 days. Pt instructed to use only on external skin and to avoid inner labia & vagina. Discussed option to try one oral diflucan since pt has been treated for yeast with terazol 7 twice now during pregnancy. Risks and benefits of PO diflucan in pregnancy reviewed. Pt desires, Rx sent. 2. Urine culture added to pts 3T labs due to history of pyelo in pregnancy. Pt instructed to completed 3T labs between 27-28 weeks. 3. Discussed that stress incontinence is common in pregnancy but not normal. Pelvic floor PT discussed, pt will consider for next visit.     Next visit: 2 weeks    Reviewed:   2nd trimester precautions and expectations   labor precautions  Danger signs  Prenatal visit schedule    Pt verbalized understanding. All questions answered.  No barriers to learning identified

## 2022-10-14 LAB
GENITAL VAGINOSIS SCREEN: NEGATIVE
TRICHOMONAS SCREEN: NEGATIVE

## 2022-10-19 ENCOUNTER — HOSPITAL ENCOUNTER (OUTPATIENT)
Age: 22
Discharge: HOME | End: 2022-10-19
Payer: MEDICAID

## 2022-10-19 ENCOUNTER — MOBILE ENCOUNTER (OUTPATIENT)
Dept: OBGYN CLINIC | Facility: CLINIC | Age: 22
End: 2022-10-19

## 2022-10-19 VITALS
OXYGEN SATURATION: 98 % | SYSTOLIC BLOOD PRESSURE: 117 MMHG | RESPIRATION RATE: 20 BRPM | TEMPERATURE: 98 F | HEART RATE: 92 BPM | DIASTOLIC BLOOD PRESSURE: 63 MMHG

## 2022-10-19 DIAGNOSIS — U07.1 COVID-19 VIRUS INFECTION: Primary | ICD-10-CM

## 2022-10-19 DIAGNOSIS — J02.0 ACUTE STREPTOCOCCAL PHARYNGITIS: ICD-10-CM

## 2022-10-19 DIAGNOSIS — Z3A.17 17 WEEKS GESTATION OF PREGNANCY: ICD-10-CM

## 2022-10-19 LAB
POCT INFLUENZA A: NEGATIVE
POCT INFLUENZA B: NEGATIVE
S PYO AG THROAT QL: POSITIVE
SARS-COV-2 RNA RESP QL NAA+PROBE: DETECTED

## 2022-10-19 PROCEDURE — 87502 INFLUENZA DNA AMP PROBE: CPT | Performed by: PHYSICIAN ASSISTANT

## 2022-10-19 PROCEDURE — 99213 OFFICE O/P EST LOW 20 MIN: CPT

## 2022-10-19 PROCEDURE — 87880 STREP A ASSAY W/OPTIC: CPT

## 2022-10-19 PROCEDURE — 99214 OFFICE O/P EST MOD 30 MIN: CPT

## 2022-10-19 RX ORDER — ACETAMINOPHEN 325 MG/1
650 TABLET ORAL
Qty: 40 TABLET | Refills: 0 | Status: SHIPPED | OUTPATIENT
Start: 2022-10-19

## 2022-10-19 RX ORDER — PENICILLIN V POTASSIUM 500 MG/1
500 TABLET ORAL 2 TIMES DAILY
Qty: 20 TABLET | Refills: 0 | Status: SHIPPED | OUTPATIENT
Start: 2022-10-19 | End: 2022-10-29

## 2022-10-19 NOTE — PROGRESS NOTES
Pc from pt reports fevers started yesterday morning. Y max 103. Body aches, chills, congestion, sore throat. Did home Covid test and negative. No flank pain, dysuria or UTi symptoms. No SOB or chest pain. Active fm and no contractions, LOF or bleeding. Advised to go to immediate care for COVID, flu & strep swab so can rule out other causes and treat accordingly. Taking Tylenol & is breaking fevers. Push fluids. To call or send m y chart message to let us know dx from immediate care. If SOB or chest pain to ER. If baby not moving well, contractions, LOF or bleeding to call.  Agrees with plan

## 2022-10-19 NOTE — ED INITIAL ASSESSMENT (HPI)
Cough, congestion, sore throat, body aches, chills and fever since yesterday, pt is 17 weeks pregnant with prenatal

## 2022-10-20 ENCOUNTER — TELEPHONE (OUTPATIENT)
Dept: OBGYN CLINIC | Facility: CLINIC | Age: 22
End: 2022-10-20

## 2022-10-20 ENCOUNTER — TELEMEDICINE (OUTPATIENT)
Dept: OBGYN CLINIC | Facility: CLINIC | Age: 22
End: 2022-10-20

## 2022-10-20 DIAGNOSIS — Z34.83 ENCOUNTER FOR SUPERVISION OF OTHER NORMAL PREGNANCY IN THIRD TRIMESTER: Primary | ICD-10-CM

## 2022-10-20 PROBLEM — O98.512 COVID-19 AFFECTING PREGNANCY IN SECOND TRIMESTER (HCC): Status: ACTIVE | Noted: 2022-10-20

## 2022-10-20 PROBLEM — O98.512 COVID-19 AFFECTING PREGNANCY IN SECOND TRIMESTER: Status: ACTIVE | Noted: 2022-10-20

## 2022-10-20 PROBLEM — U07.1 COVID-19 AFFECTING PREGNANCY IN SECOND TRIMESTER: Status: ACTIVE | Noted: 2022-10-20

## 2022-10-20 PROBLEM — U07.1 COVID-19 AFFECTING PREGNANCY IN SECOND TRIMESTER (HCC): Status: ACTIVE | Noted: 2022-10-20

## 2022-10-20 RX ORDER — FLUCONAZOLE 150 MG/1
150 TABLET ORAL ONCE
Qty: 1 TABLET | Refills: 0 | Status: SHIPPED | OUTPATIENT
Start: 2022-10-20 | End: 2022-10-20

## 2022-10-20 NOTE — PROGRESS NOTES
Virtual Telephone Check-In     Rhina Rosas verbally consents to a Virtual/Telephone Check-In visit on Specimen. Patient has been referred to the [facility] website at [email or web address] to review the yearly Consent to Treat document. Patient understands and accepts financial responsibility for any deductible, co-insurance and/or co-pays associated with this service. Duration of the service: 10 minutes        Summary of topics discussed:      Symptoms started on 10/18 with migraine and chills but progressed to fever, body aches, sore throat, congestion and mild cough yesterday so she went to urgent care. Tested positive for COVID and strep at urgent care yesterday. Urgent care doctor prescribed her penicillin 500mg BID x 10 days for strep. She reports today she is not having any fever but still mild cough, congestion and fatigue. This is her 4th time getting COVID and every time she has had it she has had a lot of GI symptoms such as stomach pains, nausea, constipation and diarrhea. Currently having constipation and the stomach pains. + FM  Denies any vaginal bleeding cramping or leakage of fluid    Recommended the following supplements:  Zinc 30-50mg daily  Vitamin C 500mg twice daily  Vitamin D 2000 international units daily     Prenatal vitamins    Discussed the importance of PO hydration. Discussed importance of completing entire antibiotic course. Requests prescription for diflucan in case the antibiotics cause a yeast infection which she has been very prone to this pregnancy. Warning signs reviewed. Discussed importance of monitoring fetal movement. Discussed Covid in pregnancy and 32 week growth US,  testing  Will follow up for PN visit after 10 days. Has appointment for Wednesday next week.

## 2022-10-20 NOTE — TELEPHONE ENCOUNTER
Spoke to pt. Scheduled virtual appt for 10/20. Pt verbalized understanding and agrees with plan. Covid care instructions sent thru [employer] message.

## 2022-10-20 NOTE — TELEPHONE ENCOUNTER
Pt states she took meds & now is having intestinal cramping. Has not had a bm for at least 2 days. Discussed fiber, increasing water intake, stool softeners. + normal fetal movement. Denies uterine cramping, ctx, vag bleeding or leaking of fluid. Instructed pt to call w/ worsening pain, decreased fm, bleeding, lof or rhythmic cramping or ctx.  Pt verbalized an understanding & agrees w/ plan    MSB spoke w/ pt at video visit today, notified of concerns & recs & agrees w/ plan

## 2022-10-20 NOTE — TELEPHONE ENCOUNTER
Pt would like to speak with nurse regarding recent diagnosis. Pt having pretty bad stomach pain. Please call.

## 2022-10-20 NOTE — TELEPHONE ENCOUNTER
----- Message from Tan Figueroa CNM sent at 10/19/2022  8:31 PM CDT -----  Regarding: Follow up Covid  Spoke with pt today with fever had her go to immediate care. Told her to send  us a my chart or call with findings.  Xavi Romero just looking and she was positive for COVID & strep so please have her schedule a virtual follow up thanks

## 2022-10-24 ENCOUNTER — LAB ENCOUNTER (OUTPATIENT)
Dept: LAB | Facility: HOSPITAL | Age: 22
End: 2022-10-24
Attending: ADVANCED PRACTICE MIDWIFE
Payer: MEDICAID

## 2022-10-24 DIAGNOSIS — Z34.02 ENCOUNTER FOR SUPERVISION OF NORMAL FIRST PREGNANCY IN SECOND TRIMESTER: ICD-10-CM

## 2022-10-24 DIAGNOSIS — Z34.90 PREGNANCY, UNSPECIFIED GESTATIONAL AGE: ICD-10-CM

## 2022-10-24 LAB
DEPRECATED HBV CORE AB SER IA-ACNC: 11.1 NG/ML
DEPRECATED RDW RBC AUTO: 38 FL (ref 35.1–46.3)
ERYTHROCYTE [DISTWIDTH] IN BLOOD BY AUTOMATED COUNT: 11.7 % (ref 11–15)
GLUCOSE 1H P GLC SERPL-MCNC: 81 MG/DL
HCT VFR BLD AUTO: 34.3 %
HGB BLD-MCNC: 11.5 G/DL
MCH RBC QN AUTO: 30.1 PG (ref 26–34)
MCHC RBC AUTO-ENTMCNC: 33.5 G/DL (ref 31–37)
MCV RBC AUTO: 89.8 FL
PLATELET # BLD AUTO: 206 10(3)UL (ref 150–450)
RBC # BLD AUTO: 3.82 X10(6)UL
WBC # BLD AUTO: 7.3 X10(3) UL (ref 4–11)

## 2022-10-24 PROCEDURE — 85027 COMPLETE CBC AUTOMATED: CPT

## 2022-10-24 PROCEDURE — 87086 URINE CULTURE/COLONY COUNT: CPT

## 2022-10-24 PROCEDURE — 36415 COLL VENOUS BLD VENIPUNCTURE: CPT

## 2022-10-24 PROCEDURE — 86780 TREPONEMA PALLIDUM: CPT

## 2022-10-24 PROCEDURE — 82728 ASSAY OF FERRITIN: CPT | Performed by: ADVANCED PRACTICE MIDWIFE

## 2022-10-24 PROCEDURE — 82950 GLUCOSE TEST: CPT

## 2022-10-24 PROCEDURE — 87389 HIV-1 AG W/HIV-1&-2 AB AG IA: CPT | Performed by: ADVANCED PRACTICE MIDWIFE

## 2022-10-26 ENCOUNTER — TELEPHONE (OUTPATIENT)
Dept: PERINATAL CARE | Facility: HOSPITAL | Age: 22
End: 2022-10-26

## 2022-10-26 ENCOUNTER — ROUTINE PRENATAL (OUTPATIENT)
Dept: OBGYN CLINIC | Facility: CLINIC | Age: 22
End: 2022-10-26
Payer: MEDICAID

## 2022-10-26 VITALS
HEART RATE: 64 BPM | BODY MASS INDEX: 26 KG/M2 | DIASTOLIC BLOOD PRESSURE: 70 MMHG | WEIGHT: 140 LBS | SYSTOLIC BLOOD PRESSURE: 109 MMHG

## 2022-10-26 DIAGNOSIS — O98.512 COVID-19 AFFECTING PREGNANCY IN SECOND TRIMESTER: Primary | ICD-10-CM

## 2022-10-26 DIAGNOSIS — U07.1 COVID-19 AFFECTING PREGNANCY IN SECOND TRIMESTER: Primary | ICD-10-CM

## 2022-10-26 PROBLEM — R79.0 LOW FERRITIN LEVEL: Status: ACTIVE | Noted: 2022-10-26

## 2022-10-26 PROCEDURE — 3074F SYST BP LT 130 MM HG: CPT | Performed by: ADVANCED PRACTICE MIDWIFE

## 2022-10-26 PROCEDURE — 3078F DIAST BP <80 MM HG: CPT | Performed by: ADVANCED PRACTICE MIDWIFE

## 2022-10-26 PROCEDURE — 0502F SUBSEQUENT PRENATAL CARE: CPT | Performed by: ADVANCED PRACTICE MIDWIFE

## 2022-10-26 NOTE — TELEPHONE ENCOUNTER
Patient is scheduled on 11-21-22  For  NO CONSULT/GROWTH/BPP   [id number]  DX   COVID  Needs PA      Pt not sure if coverage changed, \"on her mom's\".

## 2022-10-26 NOTE — PROGRESS NOTES
Active fetus Denies any complaints. Denies any vaginal bleeding, leaking of fluid or vaginal discharge. No signs signs of PTL. Reviewed S&S of PTL  Warning signs reviewed  All questions answered.   Ferritin low - iron every other day  Covid in pregnancy- growth ultrasound ordered  Flu vaccine - declined

## 2022-10-28 ENCOUNTER — TELEPHONE (OUTPATIENT)
Dept: OBGYN CLINIC | Facility: CLINIC | Age: 22
End: 2022-10-28

## 2022-10-28 LAB — T PALLIDUM AB SER QL: NEGATIVE

## 2022-10-28 NOTE — TELEPHONE ENCOUNTER
----- Message from Trudi Short CNM sent at 10/27/2022  1:52 PM CDT -----  Informed at [facility] visit

## 2022-11-02 ENCOUNTER — TELEPHONE (OUTPATIENT)
Dept: PERINATAL CARE | Facility: HOSPITAL | Age: 22
End: 2022-11-02

## 2022-11-02 NOTE — TELEPHONE ENCOUNTER
Only insurance info in pt's chart is Medicaid. Called pt to confirm insurance. Pt states she has [employer]. Asked for member ID # so a PA can be done for her growth ultrasound. Pt states she is not home & her card is not with her at the moment. Pt will call or send [employer] message w/ info when she gets home.  Pt verbalized an understanding & agrees w/ plan

## 2022-11-02 NOTE — TELEPHONE ENCOUNTER
Patient is scheduled on 11-21-22  For  GROWTH/BPP   [id number]  DX   COVID  Needs PA     Are we still saying straight Medicaid? Im getting plan mismatch in Epic.     Thanks

## 2022-11-03 NOTE — TELEPHONE ENCOUNTER
Here are the [employer] insurance details   Member name: Rena Jacob  Medicaid ID: [id number]  Member ID : [id number]  Brody Rowe: [id number]  RXPCNNmallory Nova  RXGRP: Arnold [id number] submitted for cpt codes [id number] along with clinical documentation. Pending medical review.  Case #[id number]

## 2022-11-09 ENCOUNTER — ROUTINE PRENATAL (OUTPATIENT)
Dept: OBGYN CLINIC | Facility: CLINIC | Age: 22
End: 2022-11-09
Payer: MEDICAID

## 2022-11-09 VITALS
SYSTOLIC BLOOD PRESSURE: 109 MMHG | WEIGHT: 142 LBS | BODY MASS INDEX: 26 KG/M2 | DIASTOLIC BLOOD PRESSURE: 69 MMHG | HEART RATE: 58 BPM

## 2022-11-09 DIAGNOSIS — Z34.83 ENCOUNTER FOR SUPERVISION OF OTHER NORMAL PREGNANCY IN THIRD TRIMESTER: Primary | ICD-10-CM

## 2022-11-09 PROCEDURE — 90471 IMMUNIZATION ADMIN: CPT | Performed by: ADVANCED PRACTICE MIDWIFE

## 2022-11-09 PROCEDURE — 3078F DIAST BP <80 MM HG: CPT | Performed by: ADVANCED PRACTICE MIDWIFE

## 2022-11-09 PROCEDURE — 90715 TDAP VACCINE 7 YRS/> IM: CPT | Performed by: ADVANCED PRACTICE MIDWIFE

## 2022-11-09 PROCEDURE — 0502F SUBSEQUENT PRENATAL CARE: CPT | Performed by: ADVANCED PRACTICE MIDWIFE

## 2022-11-09 PROCEDURE — 3074F SYST BP LT 130 MM HG: CPT | Performed by: ADVANCED PRACTICE MIDWIFE

## 2022-11-09 NOTE — PROGRESS NOTES
Active fetus Denies any complaints. Denies any vaginal bleeding, leaking of fluid or vaginal discharge. No signs signs of PTL. Reviewed S&S of PTL  Warning signs reviewed  All questions answered.   TDAP- today  Declines Flu and Covid  Has growth ultrasound scheduled

## 2022-11-21 ENCOUNTER — HOSPITAL ENCOUNTER (OUTPATIENT)
Dept: PERINATAL CARE | Facility: HOSPITAL | Age: 22
End: 2022-11-21
Attending: ADVANCED PRACTICE MIDWIFE
Payer: MEDICAID

## 2022-11-21 ENCOUNTER — HOSPITAL ENCOUNTER (OUTPATIENT)
Dept: PERINATAL CARE | Facility: HOSPITAL | Age: 22
Discharge: HOME | End: 2022-11-21
Attending: OBSTETRICS & GYNECOLOGY
Payer: MEDICAID

## 2022-11-21 VITALS
HEART RATE: 64 BPM | WEIGHT: 142 LBS | BODY MASS INDEX: 26 KG/M2 | SYSTOLIC BLOOD PRESSURE: 107 MMHG | DIASTOLIC BLOOD PRESSURE: 58 MMHG

## 2022-11-21 DIAGNOSIS — O98.512 COVID-19 AFFECTING PREGNANCY IN SECOND TRIMESTER: Primary | ICD-10-CM

## 2022-11-21 DIAGNOSIS — O98.512 COVID-19 AFFECTING PREGNANCY IN SECOND TRIMESTER: ICD-10-CM

## 2022-11-21 DIAGNOSIS — U07.1 COVID-19 AFFECTING PREGNANCY IN SECOND TRIMESTER: ICD-10-CM

## 2022-11-21 DIAGNOSIS — U07.1 COVID-19 AFFECTING PREGNANCY IN SECOND TRIMESTER: Primary | ICD-10-CM

## 2022-11-21 PROCEDURE — 76816 OB US FOLLOW-UP PER FETUS: CPT | Performed by: OBSTETRICS & GYNECOLOGY

## 2022-11-21 PROCEDURE — 76819 FETAL BIOPHYS PROFIL W/O NST: CPT

## 2022-11-22 ENCOUNTER — ROUTINE PRENATAL (OUTPATIENT)
Dept: OBGYN CLINIC | Facility: CLINIC | Age: 22
End: 2022-11-22
Payer: MEDICAID

## 2022-11-22 VITALS
DIASTOLIC BLOOD PRESSURE: 70 MMHG | WEIGHT: 147 LBS | HEART RATE: 76 BPM | BODY MASS INDEX: 27 KG/M2 | SYSTOLIC BLOOD PRESSURE: 113 MMHG

## 2022-11-22 DIAGNOSIS — R39.9 UTI SYMPTOMS: ICD-10-CM

## 2022-11-22 DIAGNOSIS — Z34.83 ENCOUNTER FOR SUPERVISION OF OTHER NORMAL PREGNANCY IN THIRD TRIMESTER: Primary | ICD-10-CM

## 2022-11-22 PROCEDURE — 3074F SYST BP LT 130 MM HG: CPT | Performed by: ADVANCED PRACTICE MIDWIFE

## 2022-11-22 PROCEDURE — 0502F SUBSEQUENT PRENATAL CARE: CPT | Performed by: ADVANCED PRACTICE MIDWIFE

## 2022-11-22 PROCEDURE — 3078F DIAST BP <80 MM HG: CPT | Performed by: ADVANCED PRACTICE MIDWIFE

## 2022-11-22 NOTE — PROGRESS NOTES
Active fetus Denies any complaints. Denies any vaginal bleeding, leaking of fluid or vaginal discharge. No signs signs of PTL. Reviewed S&S of PTL  Warning signs reviewed  All questions answered. Reports is not taking Keflex suppression due to aggravated yeast infections.  No UTI or yeast symptoms today, Urine culture ordered Dapsone Pregnancy And Lactation Text: This medication is Pregnancy Category C and is not considered safe during pregnancy or breast feeding.

## 2022-12-08 ENCOUNTER — HOSPITAL ENCOUNTER (OUTPATIENT)
Age: 22
Discharge: HOME | End: 2022-12-08
Payer: MEDICAID

## 2022-12-08 VITALS
OXYGEN SATURATION: 99 % | TEMPERATURE: 97 F | SYSTOLIC BLOOD PRESSURE: 127 MMHG | DIASTOLIC BLOOD PRESSURE: 64 MMHG | RESPIRATION RATE: 20 BRPM | HEART RATE: 75 BPM

## 2022-12-08 DIAGNOSIS — J06.9 VIRAL URI WITH COUGH: Primary | ICD-10-CM

## 2022-12-08 LAB
POCT INFLUENZA A: NEGATIVE
POCT INFLUENZA B: NEGATIVE
S PYO AG THROAT QL: NEGATIVE

## 2022-12-08 PROCEDURE — 99213 OFFICE O/P EST LOW 20 MIN: CPT | Performed by: NURSE PRACTITIONER

## 2022-12-08 PROCEDURE — 87502 INFLUENZA DNA AMP PROBE: CPT | Performed by: NURSE PRACTITIONER

## 2022-12-08 PROCEDURE — 87880 STREP A ASSAY W/OPTIC: CPT | Performed by: NURSE PRACTITIONER

## 2022-12-08 NOTE — ED INITIAL ASSESSMENT (HPI)
Pt states symptoms began on Sunday, pt states having a bad cough with colorful mucus. Pt states is 35 weeks pregnant. Pt denies body aches.

## 2022-12-20 ENCOUNTER — TELEPHONE (OUTPATIENT)
Dept: OBGYN CLINIC | Facility: CLINIC | Age: 22
End: 2022-12-20

## 2022-12-20 ENCOUNTER — ROUTINE PRENATAL (OUTPATIENT)
Dept: OBGYN CLINIC | Facility: CLINIC | Age: 22
End: 2022-12-20
Payer: MEDICAID

## 2022-12-20 ENCOUNTER — LAB ENCOUNTER (OUTPATIENT)
Dept: LAB | Facility: HOSPITAL | Age: 22
End: 2022-12-20
Attending: ADVANCED PRACTICE MIDWIFE
Payer: MEDICAID

## 2022-12-20 VITALS
WEIGHT: 152 LBS | SYSTOLIC BLOOD PRESSURE: 129 MMHG | DIASTOLIC BLOOD PRESSURE: 72 MMHG | BODY MASS INDEX: 28 KG/M2 | HEART RATE: 76 BPM

## 2022-12-20 DIAGNOSIS — N89.8 VAGINAL DISCHARGE: ICD-10-CM

## 2022-12-20 DIAGNOSIS — Z34.03 ENCOUNTER FOR SUPERVISION OF NORMAL FIRST PREGNANCY IN THIRD TRIMESTER: Primary | ICD-10-CM

## 2022-12-20 DIAGNOSIS — Z34.03 ENCOUNTER FOR SUPERVISION OF NORMAL FIRST PREGNANCY IN THIRD TRIMESTER: ICD-10-CM

## 2022-12-20 DIAGNOSIS — F50.89 PICA: ICD-10-CM

## 2022-12-20 PROBLEM — Z34.90 PREGNANCY (HCC): Status: ACTIVE | Noted: 2022-12-20

## 2022-12-20 PROBLEM — Z34.90 PREGNANCY: Status: ACTIVE | Noted: 2022-12-20

## 2022-12-20 LAB
DEPRECATED RDW RBC AUTO: 39.1 FL (ref 35.1–46.3)
ERYTHROCYTE [DISTWIDTH] IN BLOOD BY AUTOMATED COUNT: 12.1 % (ref 11–15)
HCT VFR BLD AUTO: 34.7 %
HGB BLD-MCNC: 11.1 G/DL
MCH RBC QN AUTO: 28 PG (ref 26–34)
MCHC RBC AUTO-ENTMCNC: 32 G/DL (ref 31–37)
MCV RBC AUTO: 87.4 FL
PLATELET # BLD AUTO: 247 10(3)UL (ref 150–450)
RBC # BLD AUTO: 3.97 X10(6)UL
WBC # BLD AUTO: 9.1 X10(3) UL (ref 4–11)

## 2022-12-20 PROCEDURE — 85027 COMPLETE CBC AUTOMATED: CPT

## 2022-12-20 PROCEDURE — 3078F DIAST BP <80 MM HG: CPT | Performed by: ADVANCED PRACTICE MIDWIFE

## 2022-12-20 PROCEDURE — 0502F SUBSEQUENT PRENATAL CARE: CPT | Performed by: ADVANCED PRACTICE MIDWIFE

## 2022-12-20 PROCEDURE — 3074F SYST BP LT 130 MM HG: CPT | Performed by: ADVANCED PRACTICE MIDWIFE

## 2022-12-20 PROCEDURE — 36415 COLL VENOUS BLD VENIPUNCTURE: CPT

## 2022-12-20 RX ORDER — BREAST PUMP
EACH MISCELLANEOUS
Qty: 1 EACH | Refills: 0 | Status: SHIPPED
Start: 2022-12-20

## 2022-12-20 NOTE — PROGRESS NOTES
Feeling well today. Complains of wetness soaking through underwear and into pants, sometimes runs down leg for the past week. Reports vaginal discharge is thin and white and has no odor. No irritation. Having occasional bryan huffman. Endorses normal fetal movement. Denies vaginal bleeding. Reviewed warning signs and symptoms and signs of labor and when to call midwife. Given 36 week packet. to review. Speculum exam today: No pooling. Copius thin white discharge. No reddness/irritation. AmniSure swab negative. Vaginosis swab collected and sent. GBS swab collected and sent today. Birth plan reviewed, need to complete review next visit.    Support: FOB and mother  Pain management: Unmedicated, no water birth  Vitamin K: Undecided  Erythromycin ointment: Undecided  Placenta: Discard  Circumcision: Yes  Peds: undecided  Housing/car seat: Review next visit  Contraception:undecided

## 2022-12-22 LAB
GENITAL VAGINOSIS SCREEN: NEGATIVE
TRICHOMONAS SCREEN: NEGATIVE

## 2022-12-23 LAB — GROUP B STREP BY PCR FOR PCR OVT: NEGATIVE

## 2022-12-24 PROBLEM — M76.899 QUADRICEPS TENDINITIS: Status: RESOLVED | Noted: 2017-02-20 | Resolved: 2022-12-24

## 2022-12-24 NOTE — PROGRESS NOTES
Tika Rosales is a 25year old  pt at 37w4d here for DEBORAH  She is feeling well. But does have right epigastric pain and seeing stars. Denies HA, dizziness or BV. Denies depression. Not bothering her. FOB (first yesterday) and his 1st born have oral cold sores. His son is non verbal.     Her partner cheated on her in . Would like repeat STD/HIV testing  Not taking Keflex daily. ABX had been discontinued d/t recurrent yeast infections    ROS:  Occas BH. Denies cramping, bleeding, leaking of fluid. Fetus is active. 22  1037   BP: 143/88   Pulse: 78   Weight: 151 lb (68.5 kg)      See flowsheet  TW lbs  GBS neg and Vaginosis swab negative    Assessment/Plan:  Hx Covid 2nd trimester- weekly NSTs  Elevated BP, r/o PreE    Orders Placed This Encounter      HSV 1 & 2 Glycoprotein Specific AB,IGG      Uric Acid      AST (SGOT)      ALT(SGPT)      CBC, Platelet; No Differential      HIV Ag/Ab Combo      T Pallidum Screening Cascade      Protein, 24-Hr Urine      FETAL NON-STRESS TEST [facility] TRIAGE/MFM DEPT     Reviewed:  Kick counts  Danger Signs  Valtrex offered today, declined  Sent to Triage for r/o Pre, serial Bps and labs  RTC 3 days    Pt verbalized understanding. All questions answered.   No barriers to learning identified

## 2022-12-27 ENCOUNTER — HOSPITAL ENCOUNTER (OUTPATIENT)
Facility: HOSPITAL | Age: 22
Discharge: HOME | End: 2022-12-27
Attending: ADVANCED PRACTICE MIDWIFE | Admitting: OBSTETRICS & GYNECOLOGY
Payer: MEDICAID

## 2022-12-27 ENCOUNTER — ROUTINE PRENATAL (OUTPATIENT)
Dept: OBGYN CLINIC | Facility: CLINIC | Age: 22
End: 2022-12-27
Payer: MEDICAID

## 2022-12-27 VITALS — DIASTOLIC BLOOD PRESSURE: 77 MMHG | SYSTOLIC BLOOD PRESSURE: 121 MMHG | HEART RATE: 74 BPM

## 2022-12-27 VITALS
SYSTOLIC BLOOD PRESSURE: 143 MMHG | BODY MASS INDEX: 28 KG/M2 | DIASTOLIC BLOOD PRESSURE: 88 MMHG | HEART RATE: 78 BPM | WEIGHT: 151 LBS

## 2022-12-27 DIAGNOSIS — U07.1 COVID-19 AFFECTING PREGNANCY IN SECOND TRIMESTER: Primary | ICD-10-CM

## 2022-12-27 DIAGNOSIS — Z34.83 ENCOUNTER FOR SUPERVISION OF OTHER NORMAL PREGNANCY IN THIRD TRIMESTER: ICD-10-CM

## 2022-12-27 DIAGNOSIS — O98.512 COVID-19 AFFECTING PREGNANCY IN SECOND TRIMESTER: Primary | ICD-10-CM

## 2022-12-27 DIAGNOSIS — R03.0 ELEVATED BP WITHOUT DIAGNOSIS OF HYPERTENSION: ICD-10-CM

## 2022-12-27 DIAGNOSIS — Z20.828 EXPOSURE TO HERPES: ICD-10-CM

## 2022-12-27 LAB
ALBUMIN SERPL-MCNC: 2.7 G/DL (ref 3.4–5)
ALBUMIN/GLOB SERPL: 0.6 {RATIO} (ref 1–2)
ALP LIVER SERPL-CCNC: 220 U/L
ALT SERPL-CCNC: 19 U/L
ANION GAP SERPL CALC-SCNC: 7 MMOL/L (ref 0–18)
AST SERPL-CCNC: 14 U/L (ref 15–37)
BASOPHILS # BLD AUTO: 0.03 X10(3) UL (ref 0–0.2)
BASOPHILS NFR BLD AUTO: 0.4 %
BILIRUB SERPL-MCNC: 0.5 MG/DL (ref 0.1–2)
BUN BLD-MCNC: 6 MG/DL (ref 7–18)
BUN/CREAT SERPL: 9.2 (ref 10–20)
CALCIUM BLD-MCNC: 8.7 MG/DL (ref 8.5–10.1)
CHLORIDE SERPL-SCNC: 107 MMOL/L (ref 98–112)
CO2 SERPL-SCNC: 23 MMOL/L (ref 21–32)
CREAT BLD-MCNC: 0.65 MG/DL
CREAT UR-SCNC: 202 MG/DL
DEPRECATED RDW RBC AUTO: 37.3 FL (ref 35.1–46.3)
EOSINOPHIL # BLD AUTO: 0.13 X10(3) UL (ref 0–0.7)
EOSINOPHIL NFR BLD AUTO: 1.6 %
ERYTHROCYTE [DISTWIDTH] IN BLOOD BY AUTOMATED COUNT: 12.4 % (ref 11–15)
FASTING STATUS PATIENT QL REPORTED: NO
GFR SERPLBLD BASED ON 1.73 SQ M-ARVRAT: 128 ML/MIN/1.73M2 (ref 60–?)
GLOBULIN PLAS-MCNC: 4.6 G/DL (ref 2.8–4.4)
GLUCOSE BLD-MCNC: 85 MG/DL (ref 70–99)
HCT VFR BLD AUTO: 35.6 %
HGB BLD-MCNC: 11.9 G/DL
HIV 1+2 AB+HIV1 P24 AG SERPL QL IA: NONREACTIVE
IMM GRANULOCYTES # BLD AUTO: 0.04 X10(3) UL (ref 0–1)
IMM GRANULOCYTES NFR BLD: 0.5 %
LYMPHOCYTES # BLD AUTO: 1.91 X10(3) UL (ref 1–4)
LYMPHOCYTES NFR BLD AUTO: 23.6 %
MCH RBC QN AUTO: 27.8 PG (ref 26–34)
MCHC RBC AUTO-ENTMCNC: 33.4 G/DL (ref 31–37)
MCV RBC AUTO: 83.2 FL
MONOCYTES # BLD AUTO: 0.58 X10(3) UL (ref 0.1–1)
MONOCYTES NFR BLD AUTO: 7.2 %
NEUTROPHILS # BLD AUTO: 5.42 X10 (3) UL (ref 1.5–7.7)
NEUTROPHILS # BLD AUTO: 5.42 X10(3) UL (ref 1.5–7.7)
NEUTROPHILS NFR BLD AUTO: 66.7 %
OSMOLALITY SERPL CALC.SUM OF ELEC: 281 MOSM/KG (ref 275–295)
PLATELET # BLD AUTO: 233 10(3)UL (ref 150–450)
POTASSIUM SERPL-SCNC: 4.1 MMOL/L (ref 3.5–5.1)
PROT SERPL-MCNC: 7.3 G/DL (ref 6.4–8.2)
PROT UR-MCNC: 18.8 MG/DL
PROT/CREAT UR-RTO: 0.09
RBC # BLD AUTO: 4.28 X10(6)UL
SODIUM SERPL-SCNC: 137 MMOL/L (ref 136–145)
URATE SERPL-MCNC: 4 MG/DL
WBC # BLD AUTO: 8.1 X10(3) UL (ref 4–11)

## 2022-12-27 PROCEDURE — 3077F SYST BP >= 140 MM HG: CPT | Performed by: ADVANCED PRACTICE MIDWIFE

## 2022-12-27 PROCEDURE — 59025 FETAL NON-STRESS TEST: CPT | Performed by: ADVANCED PRACTICE MIDWIFE

## 2022-12-27 PROCEDURE — 0502F SUBSEQUENT PRENATAL CARE: CPT | Performed by: ADVANCED PRACTICE MIDWIFE

## 2022-12-27 PROCEDURE — 3079F DIAST BP 80-89 MM HG: CPT | Performed by: ADVANCED PRACTICE MIDWIFE

## 2022-12-27 NOTE — PROGRESS NOTES
Pt is a 25year old female admitted to TR4/TR4-A. Patient presents with:  R/o Pih: Sent from office for r/o PIH due to high BP in the office       Pt is  37w4d intra-uterine pregnancy. History obtained, consents signed. Oriented to room, staff, and plan of care.

## 2022-12-27 NOTE — TRIAGE
[facility]      Triage Note    Algis Blind Patient Status:  Outpatient    2000 MRN [id number]   Location [address] Attending Freedom Torres, [address] Day # 0 PCP MD Ese Adrian: [id number]  Estimated Date of Delivery: 23  Gestation: 37w4d    Chief Complaint    R/o Pih         Allergies:    Seafood                 ANAPHYLAXIS  Strawberries            ANAPHYLAXIS, HIVES    Orders Placed This Encounter      HSV 1 & 2 Glycoprotein Specific AB,IGG      Uric Acid      Comp Metabolic Panel (14)      CBC With Differential With Platelet      Rapid HIV      T Pallidum Screening Cascade      Protein/Creatinine Ratio, Urine Random      RAPID HIV      Lab Results   Component Value Date    WBC 8.1 2022    HGB 11.9 (L) 2022    HCT 35.6 2022    .0 2022    CREATSERUM 0.65 2022    BUN 6 (L) 2022     2022    K 4.1 2022     2022    CO2 23.0 2022    GLU 85 2022    CA 8.7 2022    ALB 2.7 (L) 2022    ALKPHO 220 (H) 2022    BILT 0.5 2022    TP 7.3 2022    AST 14 (L) 2022    ALT 19 2022    TSH 0.599 2021    ESRML 7 09/15/2018    TROPHS 5 2022       Clinitek UA  Lab Results   Component Value Date    URCOLOR Dark yellow 2019    URCLA Clear 2019    GLUUR Negative 2022    URINEBILI Negative 2019    URINEKETONE Negative 2019    SPECGRAVITY 1.020 2022    PHUR 7.0 2019    PROTURINE Negative 2019    UROBILI <2.0 2019    URINENITRITE Negative 2019    URINELEUK Trace (A) 2019    URINECUL No Growth at 18-24 hrs. 2022       UA  Lab Results   Component Value Date    COLORUR Yellow 2022    CLARITY Slightly Cloudy (A) 2022    SPECGRAVITY 1.020 2022    PROUR 30 mg/dL (A) 2022    GLUUR Negative 2022    KETUR Negative 2022    BILUR Negative 2022    BLOODURINE Small (A) 2022    NITRITE Negative 2022    UROBILINOGEN 1.0 (A) 2022    LEUUR Moderate (A) 2022    UASA Negative 2022  1130 22  1145 22  1200 22  1215   BP: 116/88 120/83 110/71 121/77   Pulse: 62 72 72 74       NST  Variability: Moderate           Accelerations: Yes           Decelerations: None            Baseline: 135 BPM           Uterine Irritability: Yes           Contractions: Irregular                                        Acoustic Stimulator: No           Nonstress Test Interpretation: Reactive           Nonstress Test Second Interpretation: Reactive          FHR Category: Category I             Additional Comments Comments:  38  presents to triage for r/o PIH after a high BP in the office. All bp in triage WNL, [address] labs sent and resulted WNL. NSt reactive ROMA Saldana CNM notified of findings. Pt d/c home in stable condition. Patient presents with:  R/o Pih: Sent from office for r/o PIH due to high BP in the office          Lars Chanel RN  2022 12:41 PM      Nst reactive. Normal labs. BPs within range on prolonged monitoring. Discharge home with close follow-up.

## 2022-12-28 LAB
HSV 1 GLYCOPROTEIN G, IGG: NEGATIVE
HSV 2 GLYCOPROTEIN G, IGG: NEGATIVE
T PALLIDUM AB SER QL: NEGATIVE

## 2022-12-30 ENCOUNTER — ROUTINE PRENATAL (OUTPATIENT)
Dept: OBGYN CLINIC | Facility: CLINIC | Age: 22
End: 2022-12-30
Payer: MEDICAID

## 2022-12-30 VITALS
BODY MASS INDEX: 28 KG/M2 | HEART RATE: 71 BPM | DIASTOLIC BLOOD PRESSURE: 81 MMHG | WEIGHT: 151 LBS | SYSTOLIC BLOOD PRESSURE: 127 MMHG

## 2022-12-30 DIAGNOSIS — Z34.83 ENCOUNTER FOR SUPERVISION OF OTHER NORMAL PREGNANCY IN THIRD TRIMESTER: Primary | ICD-10-CM

## 2022-12-30 PROCEDURE — 0502F SUBSEQUENT PRENATAL CARE: CPT | Performed by: ADVANCED PRACTICE MIDWIFE

## 2022-12-30 PROCEDURE — 3074F SYST BP LT 130 MM HG: CPT | Performed by: ADVANCED PRACTICE MIDWIFE

## 2022-12-30 PROCEDURE — 3079F DIAST BP 80-89 MM HG: CPT | Performed by: ADVANCED PRACTICE MIDWIFE

## 2022-12-30 NOTE — PROGRESS NOTES
Had Triage visit earlier this week with normal labs and BPs after elevated BP in office. Active baby. No headaches. Feeling well. BP normal today. Planning natural birth does not want IOL. No classes - fob and mother for support. Sees stars occasionally when making sudden movements- has been happening a while with no change. Reviewed danger signs specifically for preeclampsia. Has NST scheduled CNM F/u 1 week.

## 2023-01-02 ENCOUNTER — APPOINTMENT (OUTPATIENT)
Dept: OBGYN CLINIC | Facility: HOSPITAL | Age: 23
End: 2023-01-02
Payer: MEDICAID

## 2023-01-02 ENCOUNTER — HOSPITAL ENCOUNTER (OUTPATIENT)
Facility: HOSPITAL | Age: 23
Discharge: HOME | End: 2023-01-02
Attending: ADVANCED PRACTICE MIDWIFE | Admitting: OBSTETRICS & GYNECOLOGY
Payer: MEDICAID

## 2023-01-02 PROCEDURE — 59025 FETAL NON-STRESS TEST: CPT

## 2023-01-02 NOTE — PROGRESS NOTES
Pt is a 25year old female admitted to TR1/TR1-A. Patient presents with:  Non-stress Test: Pt. Reports having h/o Covid     Pt is  38w3d intra-uterine pregnancy. History obtained, pt. Oriented to room, staff, and plan of care.

## 2023-01-06 ENCOUNTER — ROUTINE PRENATAL (OUTPATIENT)
Dept: OBGYN CLINIC | Facility: CLINIC | Age: 23
End: 2023-01-06
Payer: MEDICAID

## 2023-01-06 VITALS
BODY MASS INDEX: 28 KG/M2 | DIASTOLIC BLOOD PRESSURE: 81 MMHG | WEIGHT: 153 LBS | SYSTOLIC BLOOD PRESSURE: 129 MMHG | HEART RATE: 67 BPM

## 2023-01-06 DIAGNOSIS — Z34.03 PRENATAL CARE, FIRST PREGNANCY IN THIRD TRIMESTER: Primary | ICD-10-CM

## 2023-01-06 PROCEDURE — 0502F SUBSEQUENT PRENATAL CARE: CPT | Performed by: ADVANCED PRACTICE MIDWIFE

## 2023-01-06 PROCEDURE — 3074F SYST BP LT 130 MM HG: CPT | Performed by: ADVANCED PRACTICE MIDWIFE

## 2023-01-06 PROCEDURE — 3079F DIAST BP 80-89 MM HG: CPT | Performed by: ADVANCED PRACTICE MIDWIFE

## 2023-01-06 NOTE — PROGRESS NOTES
Patient seen in conjunction with SNM. I was present for exam and evaluation and agree with assessment and plan.

## 2023-01-06 NOTE — PROGRESS NOTES
Denies VB, LOF, endorses regular fetal movement. Denies headache, RUQ pain, or vision changes. She endorses some contractions that are occasionally painful (especially at nighttime), but they always go away and are not consistent. She requests SVE and membrane sweep today. Risks, benefits, and alternatives reviewed and verbal consent obtained. Sweep performed and SVE 1/60/-2. Would prefer to start labor on her own. Warning signs reviewed and when to call CNM. RTC in 1 week if not in labor before that.      DAYRON Blackwood/GEORGE under direct supervision of Armon Skiff, [place]

## 2023-01-09 ENCOUNTER — APPOINTMENT (OUTPATIENT)
Dept: OBGYN CLINIC | Facility: HOSPITAL | Age: 23
End: 2023-01-09
Attending: ADVANCED PRACTICE MIDWIFE
Payer: MEDICAID

## 2023-01-09 ENCOUNTER — HOSPITAL ENCOUNTER (OUTPATIENT)
Facility: HOSPITAL | Age: 23
Discharge: HOME | End: 2023-01-09
Attending: ADVANCED PRACTICE MIDWIFE | Admitting: OBSTETRICS & GYNECOLOGY
Payer: MEDICAID

## 2023-01-09 ENCOUNTER — NST DOCUMENTATION (OUTPATIENT)
Dept: OBGYN CLINIC | Facility: CLINIC | Age: 23
End: 2023-01-09

## 2023-01-09 VITALS — DIASTOLIC BLOOD PRESSURE: 67 MMHG | SYSTOLIC BLOOD PRESSURE: 124 MMHG | HEART RATE: 69 BPM

## 2023-01-09 DIAGNOSIS — U07.1 COVID-19 AFFECTING PREGNANCY IN SECOND TRIMESTER: ICD-10-CM

## 2023-01-09 DIAGNOSIS — Z34.90 PREGNANCY: ICD-10-CM

## 2023-01-09 DIAGNOSIS — O98.512 COVID-19 AFFECTING PREGNANCY IN SECOND TRIMESTER: ICD-10-CM

## 2023-01-09 PROCEDURE — 59025 FETAL NON-STRESS TEST: CPT

## 2023-01-09 PROCEDURE — 59025 FETAL NON-STRESS TEST: CPT | Performed by: ADVANCED PRACTICE MIDWIFE

## 2023-01-09 NOTE — NST
Nonstress Test   Patient: Saji Newellult    Gestation: 39w3d    Diagnosis from order: COVID-19 affecting pregnancy in second trimester  Inpatient order, no diagnosis associated       NST: Reactive       NST DOCUMENTATION 2023   Variability 6-25 BPM   Accelerations Yes   Decelerations None   Baseline 130   Uterine Irritability No   Contractions Irregular   Acoustic Stimulator No   Nonstress Test Interpretation Reactive   Nonstress Test Second Interpretation Reactive   FHR Category Category I   Comments -   NST Completed by DELFINA Herrera RN   Disposition home    Testing Plan Weekly NST   Provider Notified VIVIANA ventura with the above evaluation. NST completed.   Wayne Chan CNM  2023  12:10 PM

## 2023-01-10 ENCOUNTER — TELEPHONE (OUTPATIENT)
Dept: OBGYN CLINIC | Facility: CLINIC | Age: 23
End: 2023-01-10

## 2023-01-11 ENCOUNTER — HOSPITAL ENCOUNTER (OUTPATIENT)
Facility: HOSPITAL | Age: 23
Discharge: HOME | End: 2023-01-11
Attending: ADVANCED PRACTICE MIDWIFE | Admitting: OBSTETRICS & GYNECOLOGY
Payer: MEDICAID

## 2023-01-11 ENCOUNTER — HOSPITAL ENCOUNTER (INPATIENT)
Facility: HOSPITAL | Age: 23
LOS: 2 days | Discharge: HOME | End: 2023-01-13
Attending: ADVANCED PRACTICE MIDWIFE | Admitting: OBSTETRICS & GYNECOLOGY
Payer: MEDICAID

## 2023-01-11 ENCOUNTER — ANESTHESIA (OUTPATIENT)
Dept: OBGYN UNIT | Facility: HOSPITAL | Age: 23
End: 2023-01-11
Payer: MEDICAID

## 2023-01-11 ENCOUNTER — ANESTHESIA EVENT (OUTPATIENT)
Dept: OBGYN UNIT | Facility: HOSPITAL | Age: 23
End: 2023-01-11
Payer: MEDICAID

## 2023-01-11 ENCOUNTER — NST DOCUMENTATION (OUTPATIENT)
Dept: OBGYN CLINIC | Facility: CLINIC | Age: 23
End: 2023-01-11

## 2023-01-11 ENCOUNTER — MOBILE ENCOUNTER (OUTPATIENT)
Dept: OBGYN CLINIC | Facility: CLINIC | Age: 23
End: 2023-01-11

## 2023-01-11 VITALS — HEART RATE: 62 BPM | TEMPERATURE: 98 F | SYSTOLIC BLOOD PRESSURE: 125 MMHG | DIASTOLIC BLOOD PRESSURE: 77 MMHG

## 2023-01-11 DIAGNOSIS — R03.0 ELEVATED BP WITHOUT DIAGNOSIS OF HYPERTENSION: ICD-10-CM

## 2023-01-11 DIAGNOSIS — Z34.02 ENCOUNTER FOR SUPERVISION OF NORMAL FIRST PREGNANCY IN SECOND TRIMESTER: ICD-10-CM

## 2023-01-11 LAB
ANTIBODY SCREEN: NEGATIVE
BASOPHILS # BLD AUTO: 0.05 X10(3) UL (ref 0–0.2)
BASOPHILS NFR BLD AUTO: 0.5 %
DEPRECATED RDW RBC AUTO: 37.6 FL (ref 35.1–46.3)
EOSINOPHIL # BLD AUTO: 0.07 X10(3) UL (ref 0–0.7)
EOSINOPHIL NFR BLD AUTO: 0.7 %
ERYTHROCYTE [DISTWIDTH] IN BLOOD BY AUTOMATED COUNT: 12.7 % (ref 11–15)
HCT VFR BLD AUTO: 35.3 %
HGB BLD-MCNC: 11.9 G/DL
IMM GRANULOCYTES # BLD AUTO: 0.04 X10(3) UL (ref 0–1)
IMM GRANULOCYTES NFR BLD: 0.4 %
LYMPHOCYTES # BLD AUTO: 1.59 X10(3) UL (ref 1–4)
LYMPHOCYTES NFR BLD AUTO: 15.2 %
MCH RBC QN AUTO: 27.5 PG (ref 26–34)
MCHC RBC AUTO-ENTMCNC: 33.7 G/DL (ref 31–37)
MCV RBC AUTO: 81.7 FL
MONOCYTES # BLD AUTO: 0.66 X10(3) UL (ref 0.1–1)
MONOCYTES NFR BLD AUTO: 6.3 %
NEUTROPHILS # BLD AUTO: 8.04 X10 (3) UL (ref 1.5–7.7)
NEUTROPHILS # BLD AUTO: 8.04 X10(3) UL (ref 1.5–7.7)
NEUTROPHILS NFR BLD AUTO: 76.9 %
PLATELET # BLD AUTO: 223 10(3)UL (ref 150–450)
RBC # BLD AUTO: 4.32 X10(6)UL
RH BLOOD TYPE: POSITIVE
WBC # BLD AUTO: 10.5 X10(3) UL (ref 4–11)

## 2023-01-11 PROCEDURE — 0HQ9XZZ REPAIR PERINEUM SKIN, EXTERNAL APPROACH: ICD-10-PCS | Performed by: ADVANCED PRACTICE MIDWIFE

## 2023-01-11 PROCEDURE — 59409 OBSTETRICAL CARE: CPT | Performed by: ADVANCED PRACTICE MIDWIFE

## 2023-01-11 PROCEDURE — 3E0E7GC INTRODUCTION OF OTHER THERAPEUTIC SUBSTANCE INTO PRODUCTS OF CONCEPTION, VIA NATURAL OR ARTIFICIAL OPENING: ICD-10-PCS | Performed by: ADVANCED PRACTICE MIDWIFE

## 2023-01-11 PROCEDURE — 10907ZC DRAINAGE OF AMNIOTIC FLUID, THERAPEUTIC FROM PRODUCTS OF CONCEPTION, VIA NATURAL OR ARTIFICIAL OPENING: ICD-10-PCS | Performed by: ADVANCED PRACTICE MIDWIFE

## 2023-01-11 PROCEDURE — 10H07YZ INSERTION OF OTHER DEVICE INTO PRODUCTS OF CONCEPTION, VIA NATURAL OR ARTIFICIAL OPENING: ICD-10-PCS | Performed by: ADVANCED PRACTICE MIDWIFE

## 2023-01-11 RX ORDER — LIDOCAINE HYDROCHLORIDE AND EPINEPHRINE 15; 5 MG/ML; UG/ML
INJECTION, SOLUTION EPIDURAL AS NEEDED
Status: DISCONTINUED | OUTPATIENT
Start: 2023-01-11 | End: 2023-01-11 | Stop reason: SURG

## 2023-01-11 RX ORDER — IBUPROFEN 600 MG/1
600 TABLET ORAL EVERY 6 HOURS PRN
Status: DISCONTINUED | OUTPATIENT
Start: 2023-01-11 | End: 2023-01-11

## 2023-01-11 RX ORDER — TRISODIUM CITRATE DIHYDRATE AND CITRIC ACID MONOHYDRATE 500; 334 MG/5ML; MG/5ML
30 SOLUTION ORAL AS NEEDED
Status: DISCONTINUED | OUTPATIENT
Start: 2023-01-11 | End: 2023-01-11

## 2023-01-11 RX ORDER — TERBUTALINE SULFATE 1 MG/ML
0.25 INJECTION, SOLUTION SUBCUTANEOUS AS NEEDED
Status: DISCONTINUED | OUTPATIENT
Start: 2023-01-11 | End: 2023-01-11

## 2023-01-11 RX ORDER — ACETAMINOPHEN 500 MG
500 TABLET ORAL EVERY 6 HOURS PRN
Status: DISCONTINUED | OUTPATIENT
Start: 2023-01-11 | End: 2023-01-13

## 2023-01-11 RX ORDER — SIMETHICONE 80 MG
80 TABLET,CHEWABLE ORAL 3 TIMES DAILY PRN
Status: DISCONTINUED | OUTPATIENT
Start: 2023-01-11 | End: 2023-01-13

## 2023-01-11 RX ORDER — AMMONIA INHALANTS 0.04 G/.3ML
0.3 INHALANT RESPIRATORY (INHALATION) AS NEEDED
Status: DISCONTINUED | OUTPATIENT
Start: 2023-01-11 | End: 2023-01-13

## 2023-01-11 RX ORDER — ONDANSETRON 2 MG/ML
4 INJECTION INTRAMUSCULAR; INTRAVENOUS EVERY 6 HOURS PRN
Status: DISCONTINUED | OUTPATIENT
Start: 2023-01-11 | End: 2023-01-11

## 2023-01-11 RX ORDER — ACETAMINOPHEN 500 MG
1000 TABLET ORAL EVERY 6 HOURS PRN
Status: DISCONTINUED | OUTPATIENT
Start: 2023-01-11 | End: 2023-01-13

## 2023-01-11 RX ORDER — NALBUPHINE HCL 10 MG/ML
2.5 AMPUL (ML) INJECTION
Status: DISCONTINUED | OUTPATIENT
Start: 2023-01-11 | End: 2023-01-11

## 2023-01-11 RX ORDER — BUPIVACAINE HCL/0.9 % NACL/PF 0.25 %
5 PLASTIC BAG, INJECTION (ML) EPIDURAL AS NEEDED
Status: DISCONTINUED | OUTPATIENT
Start: 2023-01-11 | End: 2023-01-11

## 2023-01-11 RX ORDER — DOCUSATE SODIUM 100 MG/1
100 CAPSULE, LIQUID FILLED ORAL
Status: DISCONTINUED | OUTPATIENT
Start: 2023-01-11 | End: 2023-01-13

## 2023-01-11 RX ORDER — ACETAMINOPHEN 500 MG
500 TABLET ORAL EVERY 6 HOURS PRN
Status: DISCONTINUED | OUTPATIENT
Start: 2023-01-11 | End: 2023-01-11

## 2023-01-11 RX ORDER — BUPIVACAINE HYDROCHLORIDE 2.5 MG/ML
20 INJECTION, SOLUTION EPIDURAL; INFILTRATION; INTRACAUDAL ONCE
Status: COMPLETED | OUTPATIENT
Start: 2023-01-11 | End: 2023-01-11

## 2023-01-11 RX ORDER — BUPIVACAINE HYDROCHLORIDE 2.5 MG/ML
INJECTION, SOLUTION EPIDURAL; INFILTRATION; INTRACAUDAL
Status: DISPENSED
Start: 2023-01-11 | End: 2023-01-12

## 2023-01-11 RX ORDER — CHOLECALCIFEROL (VITAMIN D3) 25 MCG
1 TABLET,CHEWABLE ORAL DAILY
Status: DISCONTINUED | OUTPATIENT
Start: 2023-01-11 | End: 2023-01-13

## 2023-01-11 RX ORDER — DIAPER,BRIEF,INFANT-TODD,DISP
1 EACH MISCELLANEOUS EVERY 6 HOURS PRN
Status: DISCONTINUED | OUTPATIENT
Start: 2023-01-11 | End: 2023-01-13

## 2023-01-11 RX ORDER — LIDOCAINE HYDROCHLORIDE 10 MG/ML
30 INJECTION, SOLUTION EPIDURAL; INFILTRATION; INTRACAUDAL; PERINEURAL ONCE
Status: DISCONTINUED | OUTPATIENT
Start: 2023-01-11 | End: 2023-01-11

## 2023-01-11 RX ORDER — IBUPROFEN 600 MG/1
600 TABLET ORAL EVERY 6 HOURS
Status: DISCONTINUED | OUTPATIENT
Start: 2023-01-11 | End: 2023-01-13

## 2023-01-11 RX ORDER — SODIUM CHLORIDE, SODIUM LACTATE, POTASSIUM CHLORIDE, CALCIUM CHLORIDE 600; 310; 30; 20 MG/100ML; MG/100ML; MG/100ML; MG/100ML
INJECTION, SOLUTION INTRAVENOUS CONTINUOUS
Status: DISCONTINUED | OUTPATIENT
Start: 2023-01-11 | End: 2023-01-11

## 2023-01-11 RX ORDER — DEXTROSE, SODIUM CHLORIDE, SODIUM LACTATE, POTASSIUM CHLORIDE, AND CALCIUM CHLORIDE 5; .6; .31; .03; .02 G/100ML; G/100ML; G/100ML; G/100ML; G/100ML
INJECTION, SOLUTION INTRAVENOUS AS NEEDED
Status: DISCONTINUED | OUTPATIENT
Start: 2023-01-11 | End: 2023-01-11

## 2023-01-11 RX ORDER — AMMONIA INHALANTS 0.04 G/.3ML
0.3 INHALANT RESPIRATORY (INHALATION) AS NEEDED
Status: DISCONTINUED | OUTPATIENT
Start: 2023-01-11 | End: 2023-01-11

## 2023-01-11 RX ORDER — BISACODYL 10 MG
10 SUPPOSITORY, RECTAL RECTAL ONCE AS NEEDED
Status: DISCONTINUED | OUTPATIENT
Start: 2023-01-11 | End: 2023-01-13

## 2023-01-11 RX ORDER — LIDOCAINE HYDROCHLORIDE 10 MG/ML
INJECTION, SOLUTION EPIDURAL; INFILTRATION; INTRACAUDAL; PERINEURAL AS NEEDED
Status: DISCONTINUED | OUTPATIENT
Start: 2023-01-11 | End: 2023-01-11 | Stop reason: SURG

## 2023-01-11 RX ORDER — ONDANSETRON 2 MG/ML
4 INJECTION INTRAMUSCULAR; INTRAVENOUS EVERY 6 HOURS PRN
Status: DISCONTINUED | OUTPATIENT
Start: 2023-01-11 | End: 2023-01-13

## 2023-01-11 RX ADMIN — LIDOCAINE HYDROCHLORIDE 3 ML: 10 INJECTION, SOLUTION EPIDURAL; INFILTRATION; INTRACAUDAL; PERINEURAL at 09:56:00

## 2023-01-11 RX ADMIN — LIDOCAINE HYDROCHLORIDE AND EPINEPHRINE 5 ML: 15; 5 INJECTION, SOLUTION EPIDURAL at 09:58:00

## 2023-01-11 RX ADMIN — BUPIVACAINE HYDROCHLORIDE 10 ML: 2.5 INJECTION, SOLUTION EPIDURAL; INFILTRATION; INTRACAUDAL at 09:59:00

## 2023-01-11 NOTE — NST
Nonstress Test   Patient: iTka Rosales    Gestation: 25C8W    Diagnosis from order: COVID-19 affecting pregnancy in second trimester  Inpatient order, no diagnosis associated       NST:         NST DOCUMENTATION 2022   Variability 6-25 BPM   Accelerations Yes   Decelerations None   Baseline 125   Uterine Irritability No   Contractions    Contraction Frequency    Acoustic Stimulator No   Nonstress Test Interpretation Reactive   Nonstress Test Second Interpretation Reactive   FHR Category Category I   Comments -   NST Completed by -   Disposition -    Testing Plan -   Provider Notified -         I agree with the above evaluation. NST completed.   Fabrice Mejias CNM  2023  10:01 AM

## 2023-01-11 NOTE — PROGRESS NOTES
Pt is a 25year old female admitted to TR3/TR3-A. Patient presents with:  R/o Labor: Ctx 3-4min apart since . Denies leaking of fluid and vaginal bleeding. Baby moving well. Denies headache/dizziness/blurry vision. Pt is  39w5d intra-uterine pregnancy. History obtained, consents signed. Oriented to room, staff, and plan of care.

## 2023-01-11 NOTE — TELEPHONE ENCOUNTER
Patient calls with reports of contractions. States she has been having contractions all day. For the last hour they have been 3-4 minutes apart and lasting 45 seconds. She reports that some are strong but others she is able to talk and move around as normal with. Endorses regular fetal movement. Denies LOF, vaginal bleeding. She states she would like to stay home a little longer. Encouraged her to continue monitoring. If contractions stay at 3-4 minutes apart over the next 30 minutes, she should come in to the hospital. Also instructed her to call if LOF or decreased fetal movement.  Instructed her to call prior to going to the hospital.

## 2023-01-11 NOTE — ANESTHESIA POSTPROCEDURE EVALUATION
Patient: Craig Contreras    Procedure Summary     Date: 01/11/23 Room / Location:     Anesthesia Start: 0956 Anesthesia Stop: 7457    Procedure: LABOR ANALGESIA Diagnosis:     Scheduled Providers:  Anesthesiologist:     Anesthesia Type: epidural ASA Status: 2 - Emergent          Anesthesia Type: epidural    Vitals Value Taken Time   BP  01/11/23 1702   Temp  01/11/23 1702   Pulse 99 01/11/23 1659   Resp 16 01/11/23 1702   SpO2 100 % 01/11/23 1659   Vitals shown include unvalidated device data.     [address] [place] [address] AN Post Evaluation:   Patient Evaluated in PACU  Patient Participation: complete - patient participated  Level of Consciousness: awake  Pain Score: 0  Pain Management: adequate  Airway Patency:patent  Dental exam unchanged from preop  Yes    Cardiovascular Status: acceptable  Respiratory Status: acceptable  Postoperative Hydration acceptable      Kat Chirinos MD  1/11/2023 5:02 PM

## 2023-01-11 NOTE — ANESTHESIA PROCEDURE NOTES
Labor Analgesia    Date/Time: 1/11/2023 9:56 AM  Performed by: Layne Flynn MD  Authorized by: Layne Flynn MD       General Information and Staff    Start Time:  1/11/2023 9:56 AM  End Time:  1/11/2023 10:00 AM  Anesthesiologist:  Layne Flynn MD  Performed by:   Anesthesiologist  Patient Location:  OB  Site Identification: surface landmarks    Reason for Block: labor epidural    Preanesthetic Checklist: patient identified, IV checked, site marked, risks and benefits discussed, monitors and equipment checked, pre-op evaluation, timeout performed, IV bolus, anesthesia consent and sterile technique used      Procedure Details    Patient Position:  Sitting  Prep: ChloraPrep and patient draped    Monitoring:  Heart rate, cardiac monitor and continuous pulse ox  Approach:  Midline    Epidural Needle    Injection Technique:  IZZY air  Needle Type:  Tuohy  Needle Gauge:  18 G  Needle Length:  3.5 in  Needle Insertion Depth:  5  Location:  L3-4    Spinal Needle      Catheter    Catheter Type:  Multi-orifice  Catheter Size:  20 G  Catheter at Skin Depth:  9  Test Dose:  Negative    Assessment    Sensory Level:  T10    Additional Comments

## 2023-01-11 NOTE — L&D DELIVERY NOTE
[facility]    Vaginal Delivery Note    Chase Cano Patient Status:  Inpatient    2000 MRN [id number]   Location [facility] Attending Una Carolina, [address] Day # 0 PCP Antonieta Bowden MD     Delivery     Infant  Date of Delivery: 2023   Time of Delivery: 4:16 PM  Delivery Type: Normal spontaneous vaginal delivery    Infant Sex/Birthweight: male 7 lb 9.3 oz (3.44 kg)     Presentation Vertex [1]  Position Left [1] Occiput [1] Posterior [3]    Apgars:  1 minute: 9               5 minutes: 9                        10 minutes:      Placenta  Date/Time of Delivery: 2023  4:25 PM   Delivery: spontaneous  Placenta to Pathology: yes   Cord Gases Submitted: no  Cord Blood Collection: yes  Cord Tissue Collection: yes  Cord Complications: none  Sponge and Needle Counts:  Verified yes    Maternal Anesthesia: epidural and local   Episiotomy/Laceration Repair  Laceration: perineal 1st degree repaired, vaginal 1st degree repaired and periurethral not repaired  Suture Size/Type: 3-0 Chromic  Anesthesia: 1% lidocaine    Delivery Complications  none    Neonatologist Present: yes, called to be present at delivery due to deep variable decelerations with pushing    Delivery Comment: Norvel Woodside progressed to complete dilatation and 0 station prior to pushing successfully to viable baby boy. See Delivery Summary above for time, APGARs, and weight. Fetal head presented in the LOP position. Sweep for nuchal cord was performed and no nuchal cord identified. Anterior shoulder delivered spontaneously without traction. Baby vigorous at birth. To maternal abdomen for dry and stimulation then cord cut after pulsing ceased. Handed off to jennifer team for evaluation who was present for delivery due to variable decelerations with pushing. Prophylactic IV pitocin initiated in 3rd stage. Spontaneous placenta by flores mechanism, complete with 3 vessel cord.  Hemostasis achieved by fundal massage and prophylactic IV Pitocin. Vagina and perineum inspected and perineal 1st degree repaired x 1 stitch, right labial x 1 stitch, and vaginal 1st degree repaired with 3-0 chromic after lidocaine injected. Periurethral hemostatic and not repaired. Mother and infant appeared in stable condition when midwife left the delivery room. Sharps and 4x4 counts were correct. Breastfeeding initiated. Quantitative Blood Loss (mL) 104      Intake/Output   QBL:  104 ml    DAYRON Kay under direct supervision of Kristi Durán CNM   I was present with DAYRON student who assisted myself with birth and agree with above.  Bryson Loera CNM  1/11/2023  5:10 PM

## 2023-01-12 LAB
BASOPHILS # BLD AUTO: 0.03 X10(3) UL (ref 0–0.2)
BASOPHILS NFR BLD AUTO: 0.2 %
DEPRECATED RDW RBC AUTO: 39 FL (ref 35.1–46.3)
EOSINOPHIL # BLD AUTO: 0.07 X10(3) UL (ref 0–0.7)
EOSINOPHIL NFR BLD AUTO: 0.5 %
ERYTHROCYTE [DISTWIDTH] IN BLOOD BY AUTOMATED COUNT: 13 % (ref 11–15)
HCT VFR BLD AUTO: 31.8 %
HGB BLD-MCNC: 10.5 G/DL
IMM GRANULOCYTES # BLD AUTO: 0.07 X10(3) UL (ref 0–1)
IMM GRANULOCYTES NFR BLD: 0.5 %
LYMPHOCYTES # BLD AUTO: 2.16 X10(3) UL (ref 1–4)
LYMPHOCYTES NFR BLD AUTO: 15.2 %
MCH RBC QN AUTO: 27.3 PG (ref 26–34)
MCHC RBC AUTO-ENTMCNC: 33 G/DL (ref 31–37)
MCV RBC AUTO: 82.8 FL
MONOCYTES # BLD AUTO: 1.19 X10(3) UL (ref 0.1–1)
MONOCYTES NFR BLD AUTO: 8.4 %
NEUTROPHILS # BLD AUTO: 10.73 X10 (3) UL (ref 1.5–7.7)
NEUTROPHILS # BLD AUTO: 10.73 X10(3) UL (ref 1.5–7.7)
NEUTROPHILS NFR BLD AUTO: 75.2 %
PLATELET # BLD AUTO: 206 10(3)UL (ref 150–450)
RBC # BLD AUTO: 3.84 X10(6)UL
WBC # BLD AUTO: 14.3 X10(3) UL (ref 4–11)

## 2023-01-12 NOTE — PROGRESS NOTES
Patient up to bathroom with assist x 2. Voided 175cc. Patient transferred to mother/baby room 368 per wheelchair in stable condition with baby and personal belongings. Accompanied by significant other and staff. Report given to mother/baby SD Edwards.

## 2023-01-12 NOTE — CM/SW NOTE
SW self referral due to Finances/WIC/Teen Pregnancy Resources. SW met with patient and pt's parents at bedside. SW confirmed face sheet contact as correct. Baby boy name:Surinder  Date of Delivery: 1/11/2023   Time of Delivery: 4:16 PM  Delivery Type: Normal spontaneous vaginal delivery  Siblings age: N/a. Patient employed: Yes. Length of maternity leave: Denied. Father of baby employed:Yes. Length of paternity leave:Denied. Breast or formula feed:Breast feed. Pediatrician:Dr. Dave Espinoza. SW encouraged pt to schedule infant first appointment (usually within 48 hours of discharge) prior to pt discharge. Pt expressed understanding. Infant Insurance:Medicaid. Change HC contacted:Yes. Mental Health History: Depression. Medications:Denied. Therapist:Denied. Psychiatrist:Denied. SW intern discussed signs, symptoms and risks associated with post partum depression & anxiety. SW intern provided pt with PMAD resources. Other resources provided:WIC and Teen Pregnancy Resources. Patient support system: Pt's mother. Patient denied current questions/needs from TAVIA.     TAVIA/CM to remain available for support and/or discharge planning.        [facility] Work Intern

## 2023-01-13 VITALS
SYSTOLIC BLOOD PRESSURE: 110 MMHG | DIASTOLIC BLOOD PRESSURE: 63 MMHG | HEART RATE: 72 BPM | OXYGEN SATURATION: 100 % | TEMPERATURE: 98 F | RESPIRATION RATE: 18 BRPM

## 2023-01-13 RX ORDER — IBUPROFEN 600 MG/1
600 TABLET ORAL EVERY 6 HOURS PRN
Qty: 30 TABLET | Refills: 0 | Status: SHIPPED | OUTPATIENT
Start: 2023-01-13

## 2023-01-13 RX ORDER — MELATONIN
325 EVERY OTHER DAY
Qty: 30 TABLET | Refills: 5 | Status: SHIPPED | OUTPATIENT
Start: 2023-01-13

## 2023-01-13 RX ORDER — PSEUDOEPHEDRINE HCL 30 MG
100 TABLET ORAL 2 TIMES DAILY PRN
Qty: 30 CAPSULE | Refills: 0 | Status: SHIPPED | OUTPATIENT
Start: 2023-01-13

## 2023-01-13 RX ORDER — MELATONIN
325 EVERY OTHER DAY
Status: DISCONTINUED | OUTPATIENT
Start: 2023-01-13 | End: 2023-01-13

## 2023-01-13 NOTE — LACTATION NOTE
LACTATION NOTE - MOTHER      Evaluation Type: Inpatient    Problems identified  Problems identified: Knowledge deficit  Milk supply not WNL: Reduced (potential)  Problems Identified Other: formula supplement    Maternal history  Maternal history: Depression    Breastfeeding goal  Breastfeeding goal: To maintain breast milk feeding per patient goal    Maternal Assessment  Prior breastfeeding experience (comment below): Primip  Breastfeeding Assistance: Breastfeeding assistance provided with permission         Guidelines for use of:  Breast pump type: Medela Pump In Style MaxFlow  Suggested use of pump: Pump each time a supplement is offered  Other (comment): Mom reports infant BF well, cluster feeding overnight and began supplementing overnight. Infant now in procedure room for circumcision. Educated on  BF behavior, indications for pumping, nipple care and [facility] services.

## 2023-01-13 NOTE — PLAN OF CARE
Problem: Patient Centered Care  Goal: Patient preferences are identified and integrated in the patient's plan of care  Description: Interventions:  - What would you like us to know as we care for you?   - Provide timely, complete, and accurate information to patient/family  - Incorporate patient and family knowledge, values, beliefs, and cultural backgrounds into the planning and delivery of care  - Encourage patient/family to participate in care and decision-making at the level they choose  - Honor patient and family perspectives and choices  Outcome: Adequate for Discharge     Problem: Patient/Family Goals  Goal: Patient/Family Long Term Goal  Description: Patient's Long Term Goal:     Interventions:  -   - See additional Care Plan goals for specific interventions  Outcome: Adequate for Discharge  Goal: Patient/Family Short Term Goal  Description: Patient's Short Term Goal:     Interventions:   -   - See additional Care Plan goals for specific interventions  Outcome: Adequate for Discharge     Problem: POSTPARTUM  Goal: Long Term Goal:Experiences normal postpartum course  Description: INTERVENTIONS:  - Assess and monitor vital signs and lab values. - Assess fundus and lochia. - Provide ice/sitz baths for perineum discomfort. - Monitor healing of incision/episiotomy/laceration, and assess for signs and symptoms of infection and hematoma. - Assess bladder function and monitor for bladder distention.  - Provide/instruct/assist with pericare as needed. - Provide VTE prophylaxis as needed. - Monitor bowel function.  - Encourage ambulation and provide assistance as needed. - Assess and monitor emotional status and provide social service/psych resources as needed. - Utilize standard precautions and use personal protective equipment as indicated. Ensure aseptic care of all intravenous lines and invasive tubes/drains.  - Obtain immunization and exposure to communicable diseases history.   Outcome: Adequate for Discharge  Goal: Optimize infant feeding at the breast  Description: INTERVENTIONS:  - Initiate breast feeding within first hour after birth. - Monitor effectiveness of current breast feeding efforts. - Assess support systems available to mother/family.  - Identify cultural beliefs/practices regarding lactation, letdown techniques, maternal food preferences. - Assess mother's knowledge and previous experience with breast feeding.  - Provide information as needed about early infant feeding cues (e.g., rooting, lip smacking, sucking fingers/hand) versus late cue of crying.  - Discuss/demonstrate breast feeding aids (e.g., infant sling, nursing footstool/pillows, and breast pumps). - Encourage mother/other family members to express feelings/concerns, and actively listen. - Educate father/SO about benefits of breast feeding and how to manage common lactation challenges. - Recommend avoidance of specific medications or substances incompatible with breast feeding.  - Assess and monitor for signs of nipple pain/trauma. - Instruct and provide assistance with proper latch. - Review techniques for milk expression (breast pumping) and storage of breast milk. Provide pumping equipment/supplies, instructions and assistance, as needed. - Encourage rooming-in and breast feeding on demand.  - Encourage skin-to-skin contact. - Provide LC support as needed. - Assess for and manage engorgement. - Provide breast feeding education handouts and information on community breast feeding support. Outcome: Adequate for Discharge  Goal: Establishment of adequate milk supply with medication/procedure interruptions  Description: INTERVENTIONS:  - Review techniques for milk expression (breast pumping). - Provide pumping equipment/supplies, instructions, and assistance until it is safe to breastfeed infant.   Outcome: Adequate for Discharge  Goal: Experiences normal breast weaning course  Description: INTERVENTIONS:  - Assess for and manage engorgement. - Instruct on breast care. - Provide comfort measures. Outcome: Adequate for Discharge  Goal: Appropriate maternal -  bonding  Description: INTERVENTIONS:  - Assess caregiver- interactions. - Assess caregiver's emotional status and coping mechanisms. - Encourage caregiver to participate in  daily care. - Assess support systems available to mother/family.  - Provide /case management support as needed.   Outcome: Adequate for Discharge

## 2023-01-13 NOTE — DISCHARGE SUMMARY
[facility]    Discharge Summary    Saji Abernathy Patient Status:  Inpatient    2000 MRN [id number]   Location [facility] 3SE Attending Katrina Peres, [address] Day # 2       Delivering OB Clinician: Naveen Dyer CNM    [facility]: Estimated Date of Delivery: 23    Gestational Age: 39w5d    Antepartum complications: Patient Active Problem List:     Dyschromia     Pityriasis versicolor     Migraine without aura     Urinary tract infection in mother during first trimester of pregnancy     History of depression     History of heart murmur in childhood     Pyelonephritis during pregnancy     COVID-19 affecting pregnancy in second trimester     Low ferritin level     Pregnancy     Elevated BP without diagnosis of hypertension      (normal spontaneous vaginal delivery)     Anemia, postpartum      Date of Delivery: 2023 Time of Delivery: 4:16 PM    Delivery Type: spontaneous vaginal delivery    Baby: Liveborn male Information for the patient's : Mitchell Mortoner [[id number]]   7 lb 9.3 oz (3.44 kg)  Apgars:  1 minute: 9  5 minutes: 910 minutes:       Intrapartum Complications: None    Admit Date: 2023    Discharge Date: 2023    Hospital Course: No complications Routine delivery and postpartum care    Discharged Condition: stable    Disposition: home    Plan:     Follow-up appointment in 2 weeks with SEBASTIAN Briceno CNM  2023  10:59 AM

## 2023-01-22 ENCOUNTER — HOSPITAL ENCOUNTER (EMERGENCY)
Facility: HOSPITAL | Age: 23
Discharge: HOME | End: 2023-01-22
Payer: MEDICAID

## 2023-01-22 VITALS
BODY MASS INDEX: 23.92 KG/M2 | OXYGEN SATURATION: 99 % | HEIGHT: 63 IN | WEIGHT: 135 LBS | HEART RATE: 70 BPM | RESPIRATION RATE: 20 BRPM | SYSTOLIC BLOOD PRESSURE: 114 MMHG | DIASTOLIC BLOOD PRESSURE: 72 MMHG | TEMPERATURE: 98 F

## 2023-01-22 DIAGNOSIS — N94.9 GENITAL LESION, FEMALE: Primary | ICD-10-CM

## 2023-01-22 PROCEDURE — 99283 EMERGENCY DEPT VISIT LOW MDM: CPT

## 2023-01-22 PROCEDURE — 87529 HSV DNA AMP PROBE: CPT | Performed by: NURSE PRACTITIONER

## 2023-01-22 NOTE — ED INITIAL ASSESSMENT (HPI)
Patient to ER from home with c/o vaginal ulcer. Patient states she gave birth vaginally 1.5 weeks ago.

## 2023-01-23 ENCOUNTER — TELEPHONE (OUTPATIENT)
Dept: OBGYN CLINIC | Facility: CLINIC | Age: 23
End: 2023-01-23

## 2023-01-23 LAB
HSV1 DNA SPEC QL NAA+PROBE: POSITIVE
HSV2 DNA SPEC QL NAA+PROBE: NEGATIVE

## 2023-01-23 RX ORDER — ACYCLOVIR 400 MG/1
400 TABLET ORAL 3 TIMES DAILY
Qty: 21 TABLET | Refills: 0 | Status: SHIPPED | OUTPATIENT
Start: 2023-01-23 | End: 2023-01-30

## 2023-01-23 RX ORDER — VALACYCLOVIR HYDROCHLORIDE 500 MG/1
500 TABLET, FILM COATED ORAL 2 TIMES DAILY
Qty: 20 TABLET | Refills: 0 | Status: SHIPPED | OUTPATIENT
Start: 2023-01-23 | End: 2023-02-02

## 2023-01-23 NOTE — TELEPHONE ENCOUNTER
Was in ER over the weekend & cx came back + for HSV. Pt states she has not been sexually active for awhile. Her partner has a hx of cold sores. Pt states last oral sex was in April. Rx was not sent in ER because she is nursing. Advised pt I will speak w/ cnm & get back to her. Pharmacy confirmed.  Pt verbalized an understanding & agrees w/ plan

## 2023-01-23 NOTE — TELEPHONE ENCOUNTER
Pt is calling was in the er yesterday for vaginal lesion has a video appt  2 week pp .  Should Pt come in instead ,

## 2023-01-23 NOTE — TELEPHONE ENCOUNTER
Rx sent per Tawanna Hill. Discussed transmission, treatment & hygiene w/ pt.  Pt verbalized an understanding & agrees w/ plan

## 2023-01-23 NOTE — TELEPHONE ENCOUNTER
Pt desires to keep appt scheduled on MyChart & virtual canceled.  Pt verbalized an understanding & agrees w/ plan

## 2023-01-23 NOTE — PROGRESS NOTES
ED Culture Callback Results Review  Pharmacist reviewed culture results from ED visit     Positive tests noted for herpes simplex virus. Patient has not received appropriate treatment for the listed positive test results. Patient was informed of positive results via phone. Patient verbalized understanding of treatment plan, all questions answered at this time.      New prescription for acyclovir for 7 days was sent to [place] by provider

## 2023-01-25 ENCOUNTER — LAB ENCOUNTER (OUTPATIENT)
Dept: LAB | Facility: HOSPITAL | Age: 23
End: 2023-01-25
Attending: ADVANCED PRACTICE MIDWIFE
Payer: MEDICAID

## 2023-01-25 ENCOUNTER — POSTPARTUM (OUTPATIENT)
Dept: OBGYN CLINIC | Facility: CLINIC | Age: 23
End: 2023-01-25

## 2023-01-25 VITALS
WEIGHT: 139 LBS | HEART RATE: 59 BPM | SYSTOLIC BLOOD PRESSURE: 118 MMHG | DIASTOLIC BLOOD PRESSURE: 78 MMHG | BODY MASS INDEX: 25 KG/M2

## 2023-01-25 DIAGNOSIS — A60.9 HSV (HERPES SIMPLEX VIRUS) ANOGENITAL INFECTION: ICD-10-CM

## 2023-01-25 DIAGNOSIS — A60.9 HSV (HERPES SIMPLEX VIRUS) ANOGENITAL INFECTION: Primary | ICD-10-CM

## 2023-01-25 PROBLEM — Z86.19 HISTORY OF POSITIVE PCR FOR HERPES SIMPLEX VIRUS TYPE 1 (HSV-1) DNA: Status: ACTIVE | Noted: 2023-01-25

## 2023-01-25 PROBLEM — R03.0 ELEVATED BP WITHOUT DIAGNOSIS OF HYPERTENSION: Status: RESOLVED | Noted: 2022-12-27 | Resolved: 2023-01-25

## 2023-01-25 PROCEDURE — 86694 HERPES SIMPLEX NES ANTBDY: CPT

## 2023-01-25 PROCEDURE — 99213 OFFICE O/P EST LOW 20 MIN: CPT | Performed by: ADVANCED PRACTICE MIDWIFE

## 2023-01-25 PROCEDURE — 3078F DIAST BP <80 MM HG: CPT | Performed by: ADVANCED PRACTICE MIDWIFE

## 2023-01-25 PROCEDURE — 3074F SYST BP LT 130 MM HG: CPT | Performed by: ADVANCED PRACTICE MIDWIFE

## 2023-01-25 PROCEDURE — 36415 COLL VENOUS BLD VENIPUNCTURE: CPT

## 2023-01-25 PROCEDURE — 86695 HERPES SIMPLEX TYPE 1 TEST: CPT

## 2023-01-25 PROCEDURE — 86696 HERPES SIMPLEX TYPE 2 TEST: CPT

## 2023-01-27 LAB
HSV 1 GLYCOPROTEIN G, IGG: NEGATIVE
HSV 2 GLYCOPROTEIN G, IGG: NEGATIVE

## 2023-01-29 LAB — HSV TYPE 1/2 COMBINED ABS, IGM: 1.06 IV

## 2023-02-03 ENCOUNTER — TELEPHONE (OUTPATIENT)
Dept: OBGYN CLINIC | Facility: CLINIC | Age: 23
End: 2023-02-03

## 2023-02-07 ENCOUNTER — TELEPHONE (OUTPATIENT)
Dept: OBGYN CLINIC | Facility: CLINIC | Age: 23
End: 2023-02-07

## 2023-02-07 NOTE — TELEPHONE ENCOUNTER
----- Message from Mesha Brown CNM sent at 2/3/2023 11:23 AM CST -----  Type 1 new infection Suppressive therapy ordered please call

## 2023-02-13 ENCOUNTER — TELEPHONE (OUTPATIENT)
Dept: OBGYN UNIT | Facility: HOSPITAL | Age: 23
End: 2023-02-13

## 2023-02-16 ENCOUNTER — TELEMEDICINE (OUTPATIENT)
Dept: FAMILY MEDICINE CLINIC | Facility: CLINIC | Age: 23
End: 2023-02-16

## 2023-02-16 DIAGNOSIS — O92.70 LACTATION DISORDER: Primary | ICD-10-CM

## 2023-02-22 ENCOUNTER — TELEPHONE (OUTPATIENT)
Dept: OBGYN CLINIC | Facility: CLINIC | Age: 23
End: 2023-02-22

## 2023-02-22 ENCOUNTER — POSTPARTUM (OUTPATIENT)
Dept: OBGYN CLINIC | Facility: CLINIC | Age: 23
End: 2023-02-22

## 2023-02-22 VITALS
HEIGHT: 60 IN | SYSTOLIC BLOOD PRESSURE: 138 MMHG | BODY MASS INDEX: 26.31 KG/M2 | HEART RATE: 61 BPM | WEIGHT: 134 LBS | DIASTOLIC BLOOD PRESSURE: 87 MMHG

## 2023-02-22 DIAGNOSIS — N85.2 BULKY OR ENLARGED UTERUS: ICD-10-CM

## 2023-02-22 DIAGNOSIS — Z30.42 DEPO-PROVERA CONTRACEPTIVE STATUS: ICD-10-CM

## 2023-02-22 DIAGNOSIS — Z32.00 PREGNANCY EXAMINATION OR TEST, PREGNANCY UNCONFIRMED: ICD-10-CM

## 2023-02-22 LAB
CONTROL LINE PRESENT WITH A CLEAR BACKGROUND (YES/NO): YES YES/NO
PREGNANCY TEST, URINE: NEGATIVE

## 2023-02-22 PROCEDURE — 0503F POSTPARTUM CARE VISIT: CPT | Performed by: ADVANCED PRACTICE MIDWIFE

## 2023-02-22 PROCEDURE — 3075F SYST BP GE 130 - 139MM HG: CPT | Performed by: ADVANCED PRACTICE MIDWIFE

## 2023-02-22 PROCEDURE — 96372 THER/PROPH/DIAG INJ SC/IM: CPT | Performed by: ADVANCED PRACTICE MIDWIFE

## 2023-02-22 PROCEDURE — 81025 URINE PREGNANCY TEST: CPT | Performed by: ADVANCED PRACTICE MIDWIFE

## 2023-02-22 PROCEDURE — 3079F DIAST BP 80-89 MM HG: CPT | Performed by: ADVANCED PRACTICE MIDWIFE

## 2023-02-22 PROCEDURE — 3008F BODY MASS INDEX DOCD: CPT | Performed by: ADVANCED PRACTICE MIDWIFE

## 2023-02-22 RX ORDER — MEDROXYPROGESTERONE ACETATE 150 MG/ML
150 INJECTION, SUSPENSION INTRAMUSCULAR ONCE
Status: COMPLETED | OUTPATIENT
Start: 2023-02-22 | End: 2023-02-22

## 2023-02-22 RX ADMIN — MEDROXYPROGESTERONE ACETATE 150 MG: 150 INJECTION, SUSPENSION INTRAMUSCULAR at 09:22:00

## 2023-02-22 NOTE — TELEPHONE ENCOUNTER
Spoke with pt and advised ultrasound order is now in pt's chart. Pt agreed and voiced understanding.

## 2023-02-27 ENCOUNTER — TELEPHONE (OUTPATIENT)
Dept: OBGYN CLINIC | Facility: CLINIC | Age: 23
End: 2023-02-27

## 2023-02-27 NOTE — TELEPHONE ENCOUNTER
Spoke to patient. Pt states will be coming in to see midwives. Pt states she is calm and safe at home at this time. Will come in for appointment requesting direction for therapy and additionall resorcess.  Pt verbalized understanding and agrees with care plan

## 2023-02-27 NOTE — TELEPHONE ENCOUNTER
Pt stated she is going through post partum depression and wanted to be referred to a therapist. Please call.

## 2023-03-03 ENCOUNTER — TELEPHONE (OUTPATIENT)
Dept: OBGYN CLINIC | Facility: CLINIC | Age: 23
End: 2023-03-03

## 2023-03-03 NOTE — TELEPHONE ENCOUNTER
Had a pelvic exam at her 6wk PP appt & pt is having some spotting since which notes to be getting a little heavier. Pt is exclusively nursing. Reassured pt that it may be normal for a cycle to return. Pt states she was unable to have IUD placed & is awaiting scheduled ultrasound.  Will wait for ultrasound results pt verbalized an understanding & agrees w/ plan

## 2023-03-08 ENCOUNTER — TELEPHONE (OUTPATIENT)
Dept: OBGYN CLINIC | Facility: CLINIC | Age: 23
End: 2023-03-08

## 2023-03-08 ENCOUNTER — HOSPITAL ENCOUNTER (OUTPATIENT)
Dept: ULTRASOUND IMAGING | Age: 23
Discharge: HOME | End: 2023-03-08
Attending: ADVANCED PRACTICE MIDWIFE
Payer: MEDICAID

## 2023-03-08 DIAGNOSIS — N85.2 BULKY OR ENLARGED UTERUS: ICD-10-CM

## 2023-03-08 PROCEDURE — 76856 US EXAM PELVIC COMPLETE: CPT | Performed by: ADVANCED PRACTICE MIDWIFE

## 2023-03-08 PROCEDURE — 76830 TRANSVAGINAL US NON-OB: CPT | Performed by: ADVANCED PRACTICE MIDWIFE

## 2023-03-08 NOTE — TELEPHONE ENCOUNTER
Notified pt of results & instructions per [facility]. IUD appt scheduled. Instructions given. Pt req no student at appt.  Pt verbalized an understanding & agrees w/ plan

## 2023-03-08 NOTE — TELEPHONE ENCOUNTER
----- Message from Marquis Tyler CNM sent at 3/8/2023  2:55 PM CST -----  Please call pelvic ultrasound normal size uterus she can come in for her IUD placement

## 2023-03-13 ENCOUNTER — TELEPHONE (OUTPATIENT)
Dept: OBGYN CLINIC | Facility: CLINIC | Age: 23
End: 2023-03-13

## 2023-03-14 ENCOUNTER — OFFICE VISIT (OUTPATIENT)
Dept: OBGYN CLINIC | Facility: CLINIC | Age: 23
End: 2023-03-14

## 2023-03-14 VITALS
BODY MASS INDEX: 26 KG/M2 | HEART RATE: 69 BPM | WEIGHT: 132 LBS | SYSTOLIC BLOOD PRESSURE: 128 MMHG | DIASTOLIC BLOOD PRESSURE: 82 MMHG

## 2023-03-14 DIAGNOSIS — N89.8 VAGINAL ODOR: Primary | ICD-10-CM

## 2023-03-14 PROCEDURE — 3079F DIAST BP 80-89 MM HG: CPT | Performed by: ADVANCED PRACTICE MIDWIFE

## 2023-03-14 PROCEDURE — 99213 OFFICE O/P EST LOW 20 MIN: CPT | Performed by: ADVANCED PRACTICE MIDWIFE

## 2023-03-14 PROCEDURE — 3074F SYST BP LT 130 MM HG: CPT | Performed by: ADVANCED PRACTICE MIDWIFE

## 2023-03-14 RX ORDER — METRONIDAZOLE 500 MG/1
500 TABLET ORAL 2 TIMES DAILY
Qty: 14 TABLET | Refills: 0 | Status: SHIPPED | OUTPATIENT
Start: 2023-03-14 | End: 2023-03-21

## 2023-05-18 ENCOUNTER — TELEPHONE (OUTPATIENT)
Dept: OBGYN CLINIC | Facility: CLINIC | Age: 23
End: 2023-05-18

## 2023-05-30 ENCOUNTER — OFFICE VISIT (OUTPATIENT)
Dept: OBGYN CLINIC | Facility: CLINIC | Age: 23
End: 2023-05-30
Payer: MEDICAID

## 2023-05-30 ENCOUNTER — OFFICE VISIT (OUTPATIENT)
Dept: FAMILY MEDICINE CLINIC | Facility: CLINIC | Age: 23
End: 2023-05-30

## 2023-05-30 VITALS
WEIGHT: 134 LBS | DIASTOLIC BLOOD PRESSURE: 68 MMHG | HEART RATE: 76 BPM | HEIGHT: 63 IN | RESPIRATION RATE: 18 BRPM | TEMPERATURE: 98 F | BODY MASS INDEX: 23.74 KG/M2 | SYSTOLIC BLOOD PRESSURE: 108 MMHG

## 2023-05-30 VITALS
HEART RATE: 86 BPM | BODY MASS INDEX: 21.62 KG/M2 | WEIGHT: 122 LBS | SYSTOLIC BLOOD PRESSURE: 120 MMHG | DIASTOLIC BLOOD PRESSURE: 82 MMHG | HEIGHT: 63 IN

## 2023-05-30 DIAGNOSIS — Z11.3 SCREEN FOR STD (SEXUALLY TRANSMITTED DISEASE): ICD-10-CM

## 2023-05-30 DIAGNOSIS — Z32.00 PREGNANCY EXAMINATION OR TEST, PREGNANCY UNCONFIRMED: ICD-10-CM

## 2023-05-30 DIAGNOSIS — L30.9 DERMATITIS: ICD-10-CM

## 2023-05-30 DIAGNOSIS — Z30.430 ENCOUNTER FOR INSERTION OF INTRAUTERINE CONTRACEPTIVE DEVICE (IUD): Primary | ICD-10-CM

## 2023-05-30 DIAGNOSIS — Z91.018 HISTORY OF FOOD ALLERGY: Primary | ICD-10-CM

## 2023-05-30 PROBLEM — U07.1 COVID-19 AFFECTING PREGNANCY IN SECOND TRIMESTER (HCC): Status: RESOLVED | Noted: 2022-10-20 | Resolved: 2023-05-30

## 2023-05-30 PROBLEM — O23.41 URINARY TRACT INFECTION IN MOTHER DURING FIRST TRIMESTER OF PREGNANCY: Status: RESOLVED | Noted: 2022-06-21 | Resolved: 2023-05-30

## 2023-05-30 PROBLEM — O23.00 PYELONEPHRITIS DURING PREGNANCY: Status: RESOLVED | Noted: 2022-07-28 | Resolved: 2023-05-30

## 2023-05-30 PROBLEM — O98.512 COVID-19 AFFECTING PREGNANCY IN SECOND TRIMESTER: Status: RESOLVED | Noted: 2022-10-20 | Resolved: 2023-05-30

## 2023-05-30 PROBLEM — Z34.90 PREGNANCY (HCC): Status: RESOLVED | Noted: 2022-12-20 | Resolved: 2023-05-30

## 2023-05-30 PROBLEM — O23.00: Status: RESOLVED | Noted: 2022-07-28 | Resolved: 2023-05-30

## 2023-05-30 PROBLEM — U07.1 COVID-19 AFFECTING PREGNANCY IN SECOND TRIMESTER: Status: RESOLVED | Noted: 2022-10-20 | Resolved: 2023-05-30

## 2023-05-30 PROBLEM — O98.512 COVID-19 AFFECTING PREGNANCY IN SECOND TRIMESTER (HCC): Status: RESOLVED | Noted: 2022-10-20 | Resolved: 2023-05-30

## 2023-05-30 PROBLEM — O23.41 URINARY TRACT INFECTION IN MOTHER DURING FIRST TRIMESTER OF PREGNANCY (HCC): Status: RESOLVED | Noted: 2022-06-21 | Resolved: 2023-05-30

## 2023-05-30 PROBLEM — Z34.90 PREGNANCY: Status: RESOLVED | Noted: 2022-12-20 | Resolved: 2023-05-30

## 2023-05-30 LAB
CONTROL LINE PRESENT WITH A CLEAR BACKGROUND (YES/NO): YES YES/NO
PREGNANCY TEST, URINE: NEGATIVE

## 2023-05-30 PROCEDURE — 99213 OFFICE O/P EST LOW 20 MIN: CPT | Performed by: FAMILY MEDICINE

## 2023-05-30 PROCEDURE — 3074F SYST BP LT 130 MM HG: CPT | Performed by: ADVANCED PRACTICE MIDWIFE

## 2023-05-30 PROCEDURE — 3079F DIAST BP 80-89 MM HG: CPT | Performed by: ADVANCED PRACTICE MIDWIFE

## 2023-05-30 PROCEDURE — 58300 INSERT INTRAUTERINE DEVICE: CPT | Performed by: ADVANCED PRACTICE MIDWIFE

## 2023-05-30 PROCEDURE — 81025 URINE PREGNANCY TEST: CPT | Performed by: ADVANCED PRACTICE MIDWIFE

## 2023-05-30 PROCEDURE — 3008F BODY MASS INDEX DOCD: CPT | Performed by: ADVANCED PRACTICE MIDWIFE

## 2023-05-30 PROCEDURE — 3074F SYST BP LT 130 MM HG: CPT | Performed by: FAMILY MEDICINE

## 2023-05-30 PROCEDURE — 3078F DIAST BP <80 MM HG: CPT | Performed by: FAMILY MEDICINE

## 2023-05-30 PROCEDURE — 3008F BODY MASS INDEX DOCD: CPT | Performed by: FAMILY MEDICINE

## 2023-05-30 NOTE — PROCEDURES
IUD Insertion     Pregnancy Results: negative from urine test     Consent signed. Procedure discussed with the patient in detail including indication, risks, benefits, alternatives and complications. Pelvic Exam Findings:  Lesion description: = cervix grossly normal    Procedure:  Speculum placed in the vagina. Cedifil wash of vagina and cervix. Single tooth tenaculum was placed at the 12 o'clock position. Uterus sounded to 7 cm. Mirena IUD was placed without difficulty. Strings cut at 3 cm. Single tooth tenaculum removed. Pressure used to achieve hemostasis. Good hemostasis noted. GC/CHL screen performed. Patient tolerated procedure well. Visit Plan:  IUD surveillance was discussed with the patient.

## 2023-05-31 LAB
C TRACH DNA SPEC QL NAA+PROBE: NEGATIVE
N GONORRHOEA DNA SPEC QL NAA+PROBE: NEGATIVE

## 2023-07-20 ENCOUNTER — OFFICE VISIT (OUTPATIENT)
Dept: OBGYN CLINIC | Facility: CLINIC | Age: 23
End: 2023-07-20

## 2023-07-20 VITALS — DIASTOLIC BLOOD PRESSURE: 80 MMHG | SYSTOLIC BLOOD PRESSURE: 137 MMHG

## 2023-07-20 DIAGNOSIS — Z30.431 IUD CHECK UP: Primary | ICD-10-CM

## 2023-07-20 PROCEDURE — 99212 OFFICE O/P EST SF 10 MIN: CPT | Performed by: ADVANCED PRACTICE MIDWIFE

## 2023-07-20 PROCEDURE — 3079F DIAST BP 80-89 MM HG: CPT | Performed by: ADVANCED PRACTICE MIDWIFE

## 2023-07-20 PROCEDURE — 3075F SYST BP GE 130 - 139MM HG: CPT | Performed by: ADVANCED PRACTICE MIDWIFE

## 2023-07-21 NOTE — PROGRESS NOTES
Patient presents with: Follow - Up: Iud check     HPI:   Diana Duff is 26year old , here today for IUD check. She denies any complaints or concerns. Denies pelvic pain or pain with sex. Has had irregular bleeding since she gave birth but it is not bothersome to her and has been decreasing recently. mIUD placed 2023  Last pap 2022 NILM    Patient Active Problem List:     Dyschromia     Pityriasis versicolor     Migraine without aura     History of depression     History of heart murmur in childhood     Low ferritin level     History of positive PCR for herpes simplex virus type 1 (HSV-1) DNA       Medications (Active prior to today's visit):  Current Outpatient Medications   Medication Sig Dispense Refill    valACYclovir 500 MG Oral Tab Take 1 tablet (500 mg total) by mouth daily. 30 tablet 5    metoclopramide (REGLAN) 10 MG Oral Tab Take 1 tablet (10 mg total) by mouth every 6 (six) hours as needed. (Patient not taking: Reported on 2023) 20 tablet 0    ferrous sulfate 325 (65 FE) MG Oral Tab EC Take 1 tablet (325 mg total) by mouth every other day. (Patient not taking: Reported on 2023) 30 tablet 5    docusate sodium 100 MG Oral Cap Take 100 mg by mouth 2 (two) times daily as needed for constipation. (Patient not taking: Reported on 2023) 30 capsule 0    ibuprofen 600 MG Oral Tab Take 1 tablet (600 mg total) by mouth every 6 (six) hours as needed for Pain. (Patient not taking: Reported on 2023) 30 tablet 0    Misc. Devices (BREAST PUMP) Does not apply Misc Double electric breast pump CARMINA- 23 (Patient not taking: Reported on 2023) 1 each 0    PRENAT VIT-FEPOLY-METHYLFOL-FA OR Take by mouth. (Patient not taking: Reported on 2023)         Allergies:    Seafood                 ANAPHYLAXIS  Strawberries            ANAPHYLAXIS, HIVES    ROS:  Review of Systems   Constitutional: Negative. Respiratory: Negative. Cardiovascular: Negative. Gastrointestinal: Negative. Genitourinary:  Negative for difficulty urinating, dyspareunia, dysuria, frequency, genital sores, hematuria, menstrual problem, pelvic pain, urgency, vaginal bleeding, vaginal discharge and vaginal pain. Neurological: Negative. Psychiatric/Behavioral: Negative. PHYSICAL EXAM:   07/20/23  1555   BP: 137/80     Physical Exam  Vitals and nursing note reviewed. Constitutional:       Appearance: Normal appearance. HENT:      Head: Normocephalic and atraumatic. Cardiovascular:      Rate and Rhythm: Normal rate. Pulmonary:      Effort: Pulmonary effort is normal. No respiratory distress. Genitourinary:     General: Normal vulva. Exam position: Lithotomy position. Labia:         Right: No rash, tenderness, lesion or injury. Left: No rash, tenderness, lesion or injury. Urethra: No prolapse, urethral pain, urethral swelling or urethral lesion. Vagina: Normal. No vaginal discharge, tenderness or lesions. Cervix: No discharge, lesion or erythema. Comments: IUD strings visualized, 4cm in length  Skin:     General: Skin is warm and dry. Neurological:      Mental Status: She is alert and oriented to person, place, and time. Psychiatric:         Mood and Affect: Mood normal.         Behavior: Behavior normal.         Thought Content: Thought content normal.         Judgment: Judgment normal.           ASSESSMENT/PLAN:     Bret Ireland was seen today for follow - up. Diagnoses and all orders for this visit:    IUD check up    -- IUD in place, warning signs reviewed   -- Condom use encouraged for STI prevention    Follow-up/Return to clinic: PRN or for next annual    Counseling:   Contraceptive method(s), STI and HIV risk reduction/condom use  Pap, SBE/CBE, mammography      20 minutes face to face counseling, chart review, orders and coordination of care    Patient verbalized understanding, All questions answered.  No barriers to learning identified

## 2023-07-25 ENCOUNTER — LAB ENCOUNTER (OUTPATIENT)
Dept: LAB | Facility: HOSPITAL | Age: 23
End: 2023-07-25
Attending: ADVANCED PRACTICE MIDWIFE
Payer: MEDICAID

## 2023-07-25 ENCOUNTER — OFFICE VISIT (OUTPATIENT)
Dept: OBGYN CLINIC | Facility: CLINIC | Age: 23
End: 2023-07-25

## 2023-07-25 VITALS
DIASTOLIC BLOOD PRESSURE: 82 MMHG | BODY MASS INDEX: 23.92 KG/M2 | HEART RATE: 87 BPM | HEIGHT: 63 IN | WEIGHT: 135 LBS | SYSTOLIC BLOOD PRESSURE: 135 MMHG

## 2023-07-25 DIAGNOSIS — N92.6 MISSED MENSES: ICD-10-CM

## 2023-07-25 DIAGNOSIS — Z32.00 PREGNANCY EXAMINATION OR TEST, PREGNANCY UNCONFIRMED: ICD-10-CM

## 2023-07-25 DIAGNOSIS — N92.6 MISSED MENSES: Primary | ICD-10-CM

## 2023-07-25 LAB
B-HCG SERPL-ACNC: <1 MIU/ML
CONTROL LINE PRESENT WITH A CLEAR BACKGROUND (YES/NO): YES YES/NO
PREGNANCY TEST, URINE: NEGATIVE

## 2023-07-25 PROCEDURE — 3079F DIAST BP 80-89 MM HG: CPT | Performed by: ADVANCED PRACTICE MIDWIFE

## 2023-07-25 PROCEDURE — 84702 CHORIONIC GONADOTROPIN TEST: CPT

## 2023-07-25 PROCEDURE — 36415 COLL VENOUS BLD VENIPUNCTURE: CPT

## 2023-07-25 PROCEDURE — 99214 OFFICE O/P EST MOD 30 MIN: CPT | Performed by: ADVANCED PRACTICE MIDWIFE

## 2023-07-25 PROCEDURE — 3075F SYST BP GE 130 - 139MM HG: CPT | Performed by: ADVANCED PRACTICE MIDWIFE

## 2023-07-25 PROCEDURE — 81025 URINE PREGNANCY TEST: CPT | Performed by: ADVANCED PRACTICE MIDWIFE

## 2023-07-25 PROCEDURE — 3008F BODY MASS INDEX DOCD: CPT | Performed by: ADVANCED PRACTICE MIDWIFE

## 2023-07-25 NOTE — PROGRESS NOTES
Subjective:   Patient ID: Gabriel Mnedez is a 26year old female who presents for positive pregnancy test x 2 at home  Has a [place] IUD and had a string check on 7/20. Pt reports she can still feel her strings  Denies any pain or bleeding. Wants to confirm if she is pregnancy or not    HPI    History/Other:   Review of Systems   Constitutional: Negative. Respiratory: Negative. Genitourinary: Negative. Psychiatric/Behavioral: Negative. Current Outpatient Medications   Medication Sig Dispense Refill    metoclopramide (REGLAN) 10 MG Oral Tab Take 1 tablet (10 mg total) by mouth every 6 (six) hours as needed. (Patient not taking: Reported on 7/20/2023) 20 tablet 0    valACYclovir 500 MG Oral Tab Take 1 tablet (500 mg total) by mouth daily. (Patient not taking: Reported on 7/25/2023) 30 tablet 5    ferrous sulfate 325 (65 FE) MG Oral Tab EC Take 1 tablet (325 mg total) by mouth every other day. (Patient not taking: Reported on 7/20/2023) 30 tablet 5    docusate sodium 100 MG Oral Cap Take 100 mg by mouth 2 (two) times daily as needed for constipation. (Patient not taking: Reported on 7/20/2023) 30 capsule 0    ibuprofen 600 MG Oral Tab Take 1 tablet (600 mg total) by mouth every 6 (six) hours as needed for Pain. (Patient not taking: Reported on 7/20/2023) 30 tablet 0    Misc. Devices (BREAST PUMP) Does not apply Misc Double electric breast pump CARMINA- 1/13/23 (Patient not taking: Reported on 7/20/2023) 1 each 0    PRENAT VIT-FEPOLY-METHYLFOL-FA OR Take by mouth. (Patient not taking: Reported on 7/20/2023)       Allergies:  Seafood                 ANAPHYLAXIS  Strawberries            ANAPHYLAXIS, HIVES    Objective:   Physical Exam  Vitals reviewed. Constitutional:       Appearance: Normal appearance. She is normal weight. Neurological:      Mental Status: She is alert and oriented to person, place, and time.    HCG in office negative    Assessment & Plan:   Missed menses  (primary encounter diagnosis)  Pregnancy examination or test, pregnancy unconfirmed  Quantitative HCG  Reviewed with patient care if positive pregnancy result  Warning signs reviewed   30 minutes face to face counseling, chart review, orders and coordination of care     Orders Placed This Encounter      POC Urine pregnancy test [70867]      HCG, Beta Subunit (Quant Pregnancy Test)      Meds This Visit:  Requested Prescriptions      No prescriptions requested or ordered in this encounter       Imaging & Referrals:  None

## 2023-09-17 ENCOUNTER — HOSPITAL ENCOUNTER (EMERGENCY)
Facility: HOSPITAL | Age: 23
Discharge: HOME | End: 2023-09-17
Attending: EMERGENCY MEDICINE
Payer: MEDICAID

## 2023-09-17 VITALS
RESPIRATION RATE: 18 BRPM | TEMPERATURE: 98 F | OXYGEN SATURATION: 98 % | WEIGHT: 130 LBS | SYSTOLIC BLOOD PRESSURE: 121 MMHG | HEART RATE: 90 BPM | DIASTOLIC BLOOD PRESSURE: 69 MMHG | BODY MASS INDEX: 23 KG/M2

## 2023-09-17 DIAGNOSIS — M54.12 CERVICAL RADICULOPATHY: Primary | ICD-10-CM

## 2023-09-17 LAB — B-HCG UR QL: NEGATIVE

## 2023-09-17 PROCEDURE — 81025 URINE PREGNANCY TEST: CPT

## 2023-09-17 PROCEDURE — 96372 THER/PROPH/DIAG INJ SC/IM: CPT

## 2023-09-17 PROCEDURE — 99284 EMERGENCY DEPT VISIT MOD MDM: CPT

## 2023-09-17 RX ORDER — CYCLOBENZAPRINE HCL 10 MG
10 TABLET ORAL ONCE
Status: COMPLETED | OUTPATIENT
Start: 2023-09-17 | End: 2023-09-17

## 2023-09-17 RX ORDER — KETOROLAC TROMETHAMINE 10 MG/1
10 TABLET, FILM COATED ORAL EVERY 6 HOURS PRN
Qty: 30 TABLET | Refills: 0 | Status: SHIPPED | OUTPATIENT
Start: 2023-09-17 | End: 2023-09-24

## 2023-09-17 RX ORDER — CYCLOBENZAPRINE HCL 10 MG
10 TABLET ORAL 3 TIMES DAILY PRN
Qty: 20 TABLET | Refills: 0 | Status: SHIPPED | OUTPATIENT
Start: 2023-09-17 | End: 2023-09-24

## 2023-09-17 RX ORDER — KETOROLAC TROMETHAMINE 15 MG/ML
30 INJECTION, SOLUTION INTRAMUSCULAR; INTRAVENOUS ONCE
Status: COMPLETED | OUTPATIENT
Start: 2023-09-17 | End: 2023-09-17

## 2023-09-26 ENCOUNTER — TELEPHONE (OUTPATIENT)
Dept: PHYSICAL MEDICINE AND REHAB | Facility: CLINIC | Age: 23
End: 2023-09-26

## 2023-10-19 ENCOUNTER — OFFICE VISIT (OUTPATIENT)
Dept: OBGYN CLINIC | Facility: CLINIC | Age: 23
End: 2023-10-19
Payer: MEDICAID

## 2023-10-19 VITALS
WEIGHT: 131 LBS | HEIGHT: 63 IN | DIASTOLIC BLOOD PRESSURE: 82 MMHG | BODY MASS INDEX: 23.21 KG/M2 | SYSTOLIC BLOOD PRESSURE: 129 MMHG | HEART RATE: 78 BPM

## 2023-10-19 DIAGNOSIS — T83.32XA INTRAUTERINE CONTRACEPTIVE DEVICE THREADS LOST, INITIAL ENCOUNTER: ICD-10-CM

## 2023-10-19 DIAGNOSIS — R10.2 PELVIC PAIN: Primary | ICD-10-CM

## 2023-10-19 DIAGNOSIS — Z32.02 PREGNANCY EXAMINATION OR TEST, NEGATIVE RESULT: ICD-10-CM

## 2023-10-19 PROCEDURE — 3079F DIAST BP 80-89 MM HG: CPT | Performed by: ADVANCED PRACTICE MIDWIFE

## 2023-10-19 PROCEDURE — 96372 THER/PROPH/DIAG INJ SC/IM: CPT | Performed by: ADVANCED PRACTICE MIDWIFE

## 2023-10-19 PROCEDURE — 81025 URINE PREGNANCY TEST: CPT | Performed by: ADVANCED PRACTICE MIDWIFE

## 2023-10-19 PROCEDURE — 99214 OFFICE O/P EST MOD 30 MIN: CPT | Performed by: ADVANCED PRACTICE MIDWIFE

## 2023-10-19 PROCEDURE — 3074F SYST BP LT 130 MM HG: CPT | Performed by: ADVANCED PRACTICE MIDWIFE

## 2023-10-19 PROCEDURE — 3008F BODY MASS INDEX DOCD: CPT | Performed by: ADVANCED PRACTICE MIDWIFE

## 2023-10-19 RX ORDER — FLUCONAZOLE 150 MG/1
150 TABLET ORAL ONCE
Qty: 2 TABLET | Refills: 0 | Status: SHIPPED | OUTPATIENT
Start: 2023-10-19 | End: 2023-10-19

## 2023-10-19 RX ORDER — METRONIDAZOLE 500 MG/1
500 TABLET ORAL 2 TIMES DAILY
Qty: 28 TABLET | Refills: 0 | Status: SHIPPED | OUTPATIENT
Start: 2023-10-19 | End: 2023-11-02

## 2023-10-19 RX ORDER — DOXYCYCLINE HYCLATE 100 MG
100 TABLET ORAL 2 TIMES DAILY
Qty: 28 TABLET | Refills: 0 | Status: SHIPPED | OUTPATIENT
Start: 2023-10-19 | End: 2023-11-02

## 2023-10-19 RX ORDER — CEFTRIAXONE 500 MG/1
500 INJECTION, POWDER, FOR SOLUTION INTRAMUSCULAR; INTRAVENOUS ONCE
Status: COMPLETED | OUTPATIENT
Start: 2023-10-19 | End: 2023-10-19

## 2023-10-19 RX ADMIN — CEFTRIAXONE 500 MG: 500 INJECTION, POWDER, FOR SOLUTION INTRAMUSCULAR; INTRAVENOUS at 08:26:00

## 2023-10-19 NOTE — PROGRESS NOTES
Patient presents with:  Gyn Exam: Pt states she has been bleeding for 9 months. HPI:   Jamel Farias is 26year old , here today because she has been bleeding on and off since giving birth 8 months ago and it has not stopped. She reports recently for the past week she has had increased pain and bleeding. Has a lot of pain with insertion of a tampon or sex. Has cramping daily. She had mIUD inserted 2023 and hoped that would stop the bleeding. It slowed down for a little while but then started to get heavy again. Wants the IUD removed. Last pap 2022 NILM  Sexually active with 1 male partner    Patient Active Problem List:     Dyschromia     Pityriasis versicolor     Migraine without aura     History of depression     History of heart murmur in childhood     Low ferritin level     History of positive PCR for herpes simplex virus type 1 (HSV-1) DNA       Medications (Active prior to today's visit):  Current Outpatient Medications   Medication Sig Dispense Refill    Doxycycline Hyclate 100 MG Oral Tab Take 1 tablet (100 mg total) by mouth 2 (two) times daily for 14 days. 28 tablet 0    metRONIDAZOLE 500 MG Oral Tab Take 1 tablet (500 mg total) by mouth in the morning and 1 tablet (500 mg total) before bedtime. Do all this for 14 days. 28 tablet 0    fluconazole (DIFLUCAN) 150 MG Oral Tab Take 1 tablet (150 mg total) by mouth once for 1 dose. Take on the last day of antibiotics or if needed for yeast infection symptoms 2 tablet 0    metoclopramide (REGLAN) 10 MG Oral Tab Take 1 tablet (10 mg total) by mouth every 6 (six) hours as needed. (Patient not taking: Reported on 2023) 20 tablet 0    valACYclovir 500 MG Oral Tab Take 1 tablet (500 mg total) by mouth daily. (Patient not taking: Reported on 2023) 30 tablet 5    ferrous sulfate 325 (65 FE) MG Oral Tab EC Take 1 tablet (325 mg total) by mouth every other day.  (Patient not taking: Reported on 2023) 30 tablet 5    docusate sodium 100 MG Oral Cap Take 100 mg by mouth 2 (two) times daily as needed for constipation. (Patient not taking: Reported on 7/20/2023) 30 capsule 0    Misc. Devices (BREAST PUMP) Does not apply Misc Double electric breast pump CARMINA- 1/13/23 (Patient not taking: Reported on 7/20/2023) 1 each 0    PRENAT VIT-FEPOLY-METHYLFOL-FA OR Take by mouth. (Patient not taking: Reported on 7/20/2023)         Allergies:    Seafood                 ANAPHYLAXIS  Strawberries            ANAPHYLAXIS, HIVES    ROS:  Review of Systems   Constitutional: Negative. Respiratory: Negative. Cardiovascular: Negative. Gastrointestinal: Negative. Genitourinary:  Positive for dyspareunia, menstrual problem, pelvic pain and vaginal bleeding. Negative for difficulty urinating, dysuria, frequency, genital sores, hematuria, urgency, vaginal discharge and vaginal pain. Neurological: Negative. Psychiatric/Behavioral: Negative. PHYSICAL EXAM:   10/19/23  0746   BP: 129/82   Pulse: 78     Physical Exam  Vitals and nursing note reviewed. Constitutional:       General: She is not in acute distress. Appearance: Normal appearance. She is not ill-appearing. HENT:      Head: Normocephalic and atraumatic. Cardiovascular:      Rate and Rhythm: Normal rate. Pulmonary:      Effort: Pulmonary effort is normal. No respiratory distress. Genitourinary:     General: Normal vulva. Exam position: Lithotomy position. Labia:         Right: No rash, tenderness, lesion or injury. Left: No rash, tenderness, lesion or injury. Urethra: No prolapse, urethral pain, urethral swelling or urethral lesion. Vagina: Normal. No foreign body. No vaginal discharge, tenderness or lesions. Cervix: Cervical motion tenderness and cervical bleeding present. No discharge, lesion or erythema. Uterus: Tender. Not enlarged. Adnexa:         Right: Tenderness present. No fullness. Left: No tenderness or fullness. Comments: IUD strings not seen  Body of IUD not palpated in os  Skin:     General: Skin is warm and dry. Neurological:      Mental Status: She is alert and oriented to person, place, and time. Psychiatric:         Mood and Affect: Mood normal.         Behavior: Behavior normal.         Thought Content: Thought content normal.         Judgment: Judgment normal.           ASSESSMENT/PLAN:     Agustín Ash was seen today for gyn exam.    Diagnoses and all orders for this visit:    Pelvic pain  -     cefTRIAXone (Rocephin) injection 500 mg  -     Doxycycline Hyclate 100 MG Oral Tab; Take 1 tablet (100 mg total) by mouth 2 (two) times daily for 14 days. -     metRONIDAZOLE 500 MG Oral Tab; Take 1 tablet (500 mg total) by mouth in the morning and 1 tablet (500 mg total) before bedtime. Do all this for 14 days. -     fluconazole (DIFLUCAN) 150 MG Oral Tab; Take 1 tablet (150 mg total) by mouth once for 1 dose. Take on the last day of antibiotics or if needed for yeast infection symptoms  -     Trichomonas Vaginitis by TORO; Future  -     US PELVIS (TRANSABDOMINAL AND TRANSVAGINAL) (CPT=76856/62864); Future    Intrauterine contraceptive device threads lost, initial encounter  -     cefTRIAXone (Rocephin) injection 500 mg  -     Doxycycline Hyclate 100 MG Oral Tab; Take 1 tablet (100 mg total) by mouth 2 (two) times daily for 14 days. -     metRONIDAZOLE 500 MG Oral Tab; Take 1 tablet (500 mg total) by mouth in the morning and 1 tablet (500 mg total) before bedtime. Do all this for 14 days. -     fluconazole (DIFLUCAN) 150 MG Oral Tab; Take 1 tablet (150 mg total) by mouth once for 1 dose. Take on the last day of antibiotics or if needed for yeast infection symptoms  -     US PELVIS (TRANSABDOMINAL AND TRANSVAGINAL) (CPT=76856/85858); Future    Pregnancy examination or test, negative result  -     POC Urine pregnancy test [39868]    -- Stat hold & call US  -- Pt treated for PID.  Warning signs reviewed and pt instructed to go to ER if pain is not significantly resolved within 24 hours of treatment or if it worsens. Follow-up/Return to clinic: After pelvic US to discuss results/need for surgical IUD removal if IUD is still in place. Counseling:   Contraceptive method(s), STI and HIV risk reduction/condom use  Emergency contraception reviewed      20 minutes face to face counseling, chart review, orders and coordination of care    Patient verbalized understanding, All questions answered.  No barriers to learning identified

## 2023-10-20 ENCOUNTER — HOSPITAL ENCOUNTER (OUTPATIENT)
Dept: ULTRASOUND IMAGING | Facility: HOSPITAL | Age: 23
Discharge: HOME | End: 2023-10-20
Attending: ADVANCED PRACTICE MIDWIFE
Payer: MEDICAID

## 2023-10-20 DIAGNOSIS — T83.32XA INTRAUTERINE CONTRACEPTIVE DEVICE THREADS LOST, INITIAL ENCOUNTER: ICD-10-CM

## 2023-10-20 DIAGNOSIS — R10.2 PELVIC PAIN: ICD-10-CM

## 2023-10-20 LAB — T VAGINALIS RRNA SPEC QL NAA+PROBE: NEGATIVE

## 2023-10-20 PROCEDURE — 76830 TRANSVAGINAL US NON-OB: CPT | Performed by: ADVANCED PRACTICE MIDWIFE

## 2023-10-20 PROCEDURE — 76856 US EXAM PELVIC COMPLETE: CPT | Performed by: ADVANCED PRACTICE MIDWIFE

## 2023-10-20 RX ORDER — NORETHINDRONE ACETATE AND ETHINYL ESTRADIOL 1MG-20(21)
1 KIT ORAL DAILY
Qty: 28 TABLET | Refills: 11 | Status: SHIPPED | OUTPATIENT
Start: 2023-10-20 | End: 2024-10-19

## 2023-10-20 NOTE — PROGRESS NOTES
Patient informed of no IUD seen on pelvic US today. She is still bleeding and still having pain. Just started the oral antibiotics this morning. She would like COCPs to see if it helps with the bleeding. Discussed that if her pain is not significantly improved by tomorrow she should call right away as she will likely need to go to the emergency room. Discussed that since she did not see the IUD come out and its not seen on US she may need a pelvic x-ray especially if the pain and bleeding continue. She verbalized understanding and agreement. Usage, side effects and ACHES warning signs reviewed and Rx sent for Carmel Rousseau. Pt informed it is not effective for contraception for the first 7 days.

## 2023-11-07 ENCOUNTER — TELEPHONE (OUTPATIENT)
Dept: OBGYN CLINIC | Facility: CLINIC | Age: 23
End: 2023-11-07

## 2023-11-07 NOTE — TELEPHONE ENCOUNTER
Name and  verified    Patient states she has had irregular bleeding for 10 months. She had her IUD removed and a pelvic ultrasound last month. Patient offered appointment today, and declined due to work. Patient scheduled tomorrow with MS. Patient was treated for PID and put on birth control but feels treatment is not effective.

## 2023-11-08 ENCOUNTER — LAB ENCOUNTER (OUTPATIENT)
Dept: LAB | Facility: HOSPITAL | Age: 23
End: 2023-11-08
Attending: ADVANCED PRACTICE MIDWIFE
Payer: MEDICAID

## 2023-11-08 ENCOUNTER — OFFICE VISIT (OUTPATIENT)
Dept: OBGYN CLINIC | Facility: CLINIC | Age: 23
End: 2023-11-08

## 2023-11-08 VITALS
DIASTOLIC BLOOD PRESSURE: 78 MMHG | WEIGHT: 134 LBS | HEIGHT: 63 IN | SYSTOLIC BLOOD PRESSURE: 124 MMHG | BODY MASS INDEX: 23.74 KG/M2 | HEART RATE: 105 BPM

## 2023-11-08 DIAGNOSIS — Z32.00 PREGNANCY EXAMINATION OR TEST, PREGNANCY UNCONFIRMED: ICD-10-CM

## 2023-11-08 DIAGNOSIS — N93.9 ABNORMAL UTERINE BLEEDING (AUB): Primary | ICD-10-CM

## 2023-11-08 DIAGNOSIS — N93.9 ABNORMAL UTERINE BLEEDING (AUB): ICD-10-CM

## 2023-11-08 DIAGNOSIS — T83.32XD INTRAUTERINE CONTRACEPTIVE DEVICE THREADS LOST, SUBSEQUENT ENCOUNTER: ICD-10-CM

## 2023-11-08 LAB
CONTROL LINE PRESENT WITH A CLEAR BACKGROUND (YES/NO): YES YES/NO
DEPRECATED RDW RBC AUTO: 40.8 FL (ref 35.1–46.3)
ERYTHROCYTE [DISTWIDTH] IN BLOOD BY AUTOMATED COUNT: 13.5 % (ref 11–15)
HCT VFR BLD AUTO: 38.3 %
HGB BLD-MCNC: 12.4 G/DL
MCH RBC QN AUTO: 26.9 PG (ref 26–34)
MCHC RBC AUTO-ENTMCNC: 32.4 G/DL (ref 31–37)
MCV RBC AUTO: 83.1 FL
PLATELET # BLD AUTO: 287 10(3)UL (ref 150–450)
PREGNANCY TEST, URINE: NEGATIVE
RBC # BLD AUTO: 4.61 X10(6)UL
TSI SER-ACNC: 0.31 MIU/ML (ref 0.55–4.78)
WBC # BLD AUTO: 9.1 X10(3) UL (ref 4–11)

## 2023-11-08 PROCEDURE — 85027 COMPLETE CBC AUTOMATED: CPT

## 2023-11-08 PROCEDURE — 84443 ASSAY THYROID STIM HORMONE: CPT

## 2023-11-08 PROCEDURE — 3074F SYST BP LT 130 MM HG: CPT | Performed by: ADVANCED PRACTICE MIDWIFE

## 2023-11-08 PROCEDURE — 3078F DIAST BP <80 MM HG: CPT | Performed by: ADVANCED PRACTICE MIDWIFE

## 2023-11-08 PROCEDURE — 3008F BODY MASS INDEX DOCD: CPT | Performed by: ADVANCED PRACTICE MIDWIFE

## 2023-11-08 PROCEDURE — 36415 COLL VENOUS BLD VENIPUNCTURE: CPT

## 2023-11-08 PROCEDURE — 99214 OFFICE O/P EST MOD 30 MIN: CPT | Performed by: ADVANCED PRACTICE MIDWIFE

## 2023-11-08 PROCEDURE — 81025 URINE PREGNANCY TEST: CPT | Performed by: ADVANCED PRACTICE MIDWIFE

## 2023-11-08 RX ORDER — DROSPIRENONE AND ETHINYL ESTRADIOL 0.02-3(28)
KIT ORAL
Qty: 28 TABLET | Refills: 0 | Status: SHIPPED | OUTPATIENT
Start: 2023-11-08

## 2023-11-09 ENCOUNTER — TELEPHONE (OUTPATIENT)
Dept: OBGYN CLINIC | Facility: CLINIC | Age: 23
End: 2023-11-09

## 2023-11-09 NOTE — TELEPHONE ENCOUNTER
Patient verified name and     Aware of recommendations. Verbalized understanding and agreed. Patient given phone number for Dr. Ferd Lira office to call and schedule consult.

## 2023-11-09 NOTE — TELEPHONE ENCOUNTER
----- Message from Jose Ventura CNM sent at 11/9/2023  1:42 PM CST -----  Please call her thyroid is abnormal  slightyly low - hyperthyroid  Referral to endo she can see anyone in [facility] office first available

## 2023-11-11 ENCOUNTER — TELEPHONE (OUTPATIENT)
Dept: OBGYN CLINIC | Facility: CLINIC | Age: 23
End: 2023-11-11

## 2023-11-11 NOTE — PROGRESS NOTES
Subjective:   Patient ID: Chase Cano is a 26year old female who presents for evaluation of continuous menses and periods when it is heavy and she is bleeding clots. Pt was seen on 10/20 and was treated for PID and placed on COCP. At this visit her IUD strings were not visualized She was sent for a pelvic ultrasound which confirmed missing IUD. Pt feels her menses has became heavier. She is 8 months PP     HPI    History/Other:   Review of Systems   Constitutional: Negative. Respiratory: Negative. Cardiovascular: Negative. Endocrine: Negative. Neurological: Negative. Psychiatric/Behavioral: Negative. Current Outpatient Medications   Medication Sig Dispense Refill    Drospirenone-Ethinyl Estradiol (JYOTSNA) 3-0.02 MG Oral Tab Take 3 pills x 3 days then 2 pills x 2 days then  1 pill daily until end of package 28 tablet 0    Norethin Ace-Eth Estrad-FE (JUNEL FE 1/20) 1-20 MG-MCG Oral Tab Take 1 tablet by mouth daily. 28 tablet 11    metoclopramide (REGLAN) 10 MG Oral Tab Take 1 tablet (10 mg total) by mouth every 6 (six) hours as needed. (Patient not taking: Reported on 7/20/2023) 20 tablet 0    valACYclovir 500 MG Oral Tab Take 1 tablet (500 mg total) by mouth daily. (Patient not taking: Reported on 7/25/2023) 30 tablet 5    ferrous sulfate 325 (65 FE) MG Oral Tab EC Take 1 tablet (325 mg total) by mouth every other day. (Patient not taking: Reported on 7/20/2023) 30 tablet 5    docusate sodium 100 MG Oral Cap Take 100 mg by mouth 2 (two) times daily as needed for constipation. (Patient not taking: Reported on 7/20/2023) 30 capsule 0    Misc. Devices (BREAST PUMP) Does not apply Misc Double electric breast pump CARMINA- 1/13/23 (Patient not taking: Reported on 7/20/2023) 1 each 0    PRENAT VIT-FEPOLY-METHYLFOL-FA OR Take by mouth. (Patient not taking: Reported on 7/20/2023)       Allergies:   Allergies   Allergen Reactions    Seafood ANAPHYLAXIS    Strawberries ANAPHYLAXIS and HIVES Objective:   Physical Exam  Vitals reviewed. Constitutional:       General: She is not in acute distress. Appearance: Normal appearance. She is normal weight. She is not ill-appearing, toxic-appearing or diaphoretic. Cardiovascular:      Rate and Rhythm: Normal rate. Pulmonary:      Effort: Pulmonary effort is normal.   Genitourinary:     General: Normal vulva. Exam position: Lithotomy position. Labia:         Right: No rash, tenderness, lesion or injury. Left: No rash, tenderness, lesion or injury. Vagina: Normal.      Cervix: Normal.      Uterus: Normal.       Adnexa: Right adnexa normal and left adnexa normal.   Neurological:      Mental Status: She is alert. Assessment & Plan:   1. Abnormal uterine bleeding (AUB)    2. Pregnancy examination or test, pregnancy unconfirmed    3. Intrauterine contraceptive device threads lost, subsequent encounter    Reviewed etiology of heavy menses. Reviewed various treatment options. Pt opts for increase dosage of COCP as ordered  \"ACHES\" reviewed  Reviewed lost IUD and abd x-ray recommended  Labs ordered    Orders Placed This Encounter   Procedures    POC Urine pregnancy test [73785]    Assay, Thyroid Stim Hormone    CBC, Platelet;  No Differential       Meds This Visit:  Requested Prescriptions     Signed Prescriptions Disp Refills    Drospirenone-Ethinyl Estradiol (JYOTSNA) 3-0.02 MG Oral Tab 28 tablet 0     Sig: Take 3 pills x 3 days then 2 pills x 2 days then  1 pill daily until end of package       Imaging & Referrals:  XR ABDOMEN (1 VIEW) (CPT=74018)

## 2023-12-08 ENCOUNTER — TELEPHONE (OUTPATIENT)
Dept: OBGYN CLINIC | Facility: CLINIC | Age: 23
End: 2023-12-08

## 2023-12-11 ENCOUNTER — PATIENT MESSAGE (OUTPATIENT)
Dept: ENDOCRINOLOGY CLINIC | Facility: CLINIC | Age: 23
End: 2023-12-11

## 2023-12-11 ENCOUNTER — TELEPHONE (OUTPATIENT)
Dept: ENDOCRINOLOGY CLINIC | Facility: CLINIC | Age: 23
End: 2023-12-11

## 2023-12-11 ENCOUNTER — TELEMEDICINE (OUTPATIENT)
Dept: FAMILY MEDICINE CLINIC | Facility: CLINIC | Age: 23
End: 2023-12-11
Payer: MEDICAID

## 2023-12-11 ENCOUNTER — LAB ENCOUNTER (OUTPATIENT)
Dept: LAB | Facility: REFERENCE LAB | Age: 23
End: 2023-12-11
Attending: INTERNAL MEDICINE
Payer: MEDICAID

## 2023-12-11 ENCOUNTER — TELEMEDICINE (OUTPATIENT)
Dept: ENDOCRINOLOGY CLINIC | Facility: CLINIC | Age: 23
End: 2023-12-11
Payer: MEDICAID

## 2023-12-11 DIAGNOSIS — E05.90 SUBCLINICAL HYPERTHYROIDISM: Primary | ICD-10-CM

## 2023-12-11 DIAGNOSIS — E05.90 SUBCLINICAL HYPERTHYROIDISM: ICD-10-CM

## 2023-12-11 DIAGNOSIS — E05.90 HYPERTHYROIDISM: Primary | ICD-10-CM

## 2023-12-11 LAB
T3FREE SERPL-MCNC: 3.56 PG/ML (ref 2.4–4.2)
T4 FREE SERPL-MCNC: 1.1 NG/DL (ref 0.8–1.7)
TSI SER-ACNC: 0.5 MIU/ML (ref 0.55–4.78)

## 2023-12-11 PROCEDURE — 84443 ASSAY THYROID STIM HORMONE: CPT

## 2023-12-11 PROCEDURE — 36415 COLL VENOUS BLD VENIPUNCTURE: CPT

## 2023-12-11 PROCEDURE — 84445 ASSAY OF TSI GLOBULIN: CPT

## 2023-12-11 PROCEDURE — 84481 FREE ASSAY (FT-3): CPT

## 2023-12-11 PROCEDURE — 84439 ASSAY OF FREE THYROXINE: CPT

## 2023-12-11 NOTE — TELEPHONE ENCOUNTER
Dr Dona Camarena is asking if this pt can be seen sooner than February for thyroid issue - asking for pt to be called

## 2023-12-11 NOTE — TELEPHONE ENCOUNTER
Should this patient be seen sooner than February for consult? Cannot be seen by Dr. Tal Peter due to insurance.     Component      Latest Ref Rng 11/8/2023   TSH      0.550 - 4.780 mIU/mL 0.314 (L)

## 2023-12-11 NOTE — PROGRESS NOTES
Subjective:   Patient ID: Heidi Marks is a 26year old female. This visit is conducted using Telemedicine with live, interactive video and audio during this Coronavirus pandemic. Please note that the following visit was completed using two-way, real-time interactive audio and/or video communication. This has been done in good katja to provide continuity of care in the best interest of the provider-patient relationship, due to the ongoing public health crisis/national emergency and because of restrictions of visitation. There are limitations of this visit as no physical exam could be performed. Every conscious effort was taken to allow for sufficient and adequate time. This billing was spent on reviewing labs, medications, radiology tests and decision making. Appropriate medical decision-making and tests are ordered as detailed in the plan of care above    Virtual Telephone Check-In    Heidi Marks verbally consents to a Virtual/Telephone Check-In visit on 12/11/23. Patient has been referred to the [facility] website at [email or web address] to review the yearly Consent to Treat document. Patient understands and accepts financial responsibility for any deductible, co-insurance and/or co-pays associated with this service. Duration of the service: 5 minutes      Summary of topics discussed: hyperthyroid/ referral    Pt presents with hx of recent diagnosis of hyperthyroidism as per her nurse midwife. Pt was given referral for endocrine but appt not till February of next year. Pt requesting earlier appt. Pt has had some weight loss but no other sig symptoms. Family hx of thyroid issues. Pt is planning pregnancy.     Earle Cano MD                  History/Other:   Review of Systems  Current Outpatient Medications   Medication Sig Dispense Refill    Drospirenone-Ethinyl Estradiol (JYOTSNA) 3-0.02 MG Oral Tab Take 3 pills x 3 days then 2 pills x 2 days then  1 pill daily until end of package 28 tablet 0    Norethin Ace-Eth Estrad-FE (JUNEL FE 1/20) 1-20 MG-MCG Oral Tab Take 1 tablet by mouth daily. 28 tablet 11    metoclopramide (REGLAN) 10 MG Oral Tab Take 1 tablet (10 mg total) by mouth every 6 (six) hours as needed. (Patient not taking: Reported on 7/20/2023) 20 tablet 0    valACYclovir 500 MG Oral Tab Take 1 tablet (500 mg total) by mouth daily. (Patient not taking: Reported on 7/25/2023) 30 tablet 5    ferrous sulfate 325 (65 FE) MG Oral Tab EC Take 1 tablet (325 mg total) by mouth every other day. (Patient not taking: Reported on 7/20/2023) 30 tablet 5    docusate sodium 100 MG Oral Cap Take 100 mg by mouth 2 (two) times daily as needed for constipation. (Patient not taking: Reported on 7/20/2023) 30 capsule 0    Misc. Devices (BREAST PUMP) Does not apply Misc Double electric breast pump CARMINA- 1/13/23 (Patient not taking: Reported on 7/20/2023) 1 each 0    PRENAT VIT-FEPOLY-METHYLFOL-FA OR Take by mouth. (Patient not taking: Reported on 7/20/2023)       Allergies: Allergies   Allergen Reactions    Seafood ANAPHYLAXIS    Strawberries ANAPHYLAXIS and HIVES       Objective:   Physical Exam    Assessment & Plan:   1. Hyperthyroidism: reviewed labs from nurse midwife  - After discussion, will send to endocrine as requested for further evaluation and treatment; To call if any significant symptoms. Referral placed. Will have staff see if earlier appt. -  VIDEO VISIT done. No orders of the defined types were placed in this encounter.       Meds This Visit:  Requested Prescriptions      No prescriptions requested or ordered in this encounter       Imaging & Referrals:  ENDOCRINOLOGY - INTERNAL

## 2023-12-14 LAB — THY STIM IMMUNO: <0.1 IU/L

## 2023-12-23 ENCOUNTER — APPOINTMENT (OUTPATIENT)
Dept: CT IMAGING | Facility: HOSPITAL | Age: 23
End: 2023-12-23
Attending: EMERGENCY MEDICINE
Payer: MEDICAID

## 2023-12-23 ENCOUNTER — HOSPITAL ENCOUNTER (EMERGENCY)
Facility: HOSPITAL | Age: 23
Discharge: HOME | End: 2023-12-23
Attending: EMERGENCY MEDICINE
Payer: MEDICAID

## 2023-12-23 VITALS
HEART RATE: 78 BPM | OXYGEN SATURATION: 99 % | RESPIRATION RATE: 18 BRPM | TEMPERATURE: 98 F | SYSTOLIC BLOOD PRESSURE: 111 MMHG | DIASTOLIC BLOOD PRESSURE: 70 MMHG

## 2023-12-23 DIAGNOSIS — S40.021A CONTUSION OF RIGHT UPPER EXTREMITY, INITIAL ENCOUNTER: ICD-10-CM
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
## 2023-12-23 DIAGNOSIS — S09.90XA INJURY OF HEAD, INITIAL ENCOUNTER: Primary | ICD-10-CM

## 2023-12-23 PROCEDURE — 70450 CT HEAD/BRAIN W/O DYE: CPT | Performed by: EMERGENCY MEDICINE

## 2023-12-23 PROCEDURE — 99284 EMERGENCY DEPT VISIT MOD MDM: CPT

## 2023-12-23 NOTE — ED QUICK NOTES
Pt refused to wait for . Pt states that she needs to get out of here. Pt stated that she did not want any resources. Pt states that she is fine and will find help if she needs it.
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
## 2024-01-04 ENCOUNTER — HOSPITAL ENCOUNTER (OUTPATIENT)
Dept: NUCLEAR MEDICINE | Facility: HOSPITAL | Age: 24
Discharge: HOME | End: 2024-01-04
Attending: INTERNAL MEDICINE
Payer: MEDICAID

## 2024-01-04 DIAGNOSIS — E05.90 SUBCLINICAL HYPERTHYROIDISM: ICD-10-CM

## 2024-01-04 PROCEDURE — 78014 THYROID IMAGING W/BLOOD FLOW: CPT | Performed by: INTERNAL MEDICINE

## 2024-01-16 ENCOUNTER — PATIENT MESSAGE (OUTPATIENT)
Dept: ENDOCRINOLOGY CLINIC | Facility: CLINIC | Age: 24
End: 2024-01-16

## 2024-01-16 DIAGNOSIS — E05.90 HYPERTHYROIDISM: Primary | ICD-10-CM

## 2024-01-22 ENCOUNTER — LAB ENCOUNTER (OUTPATIENT)
Dept: LAB | Facility: HOSPITAL | Age: 24
End: 2024-01-22
Attending: INTERNAL MEDICINE
Payer: MEDICAID

## 2024-01-22 DIAGNOSIS — E05.90 HYPERTHYROIDISM: ICD-10-CM

## 2024-01-22 DIAGNOSIS — E06.9 THYROIDITIS: Primary | ICD-10-CM

## 2024-01-22 LAB
T3FREE SERPL-MCNC: 3.33 PG/ML (ref 2.4–4.2)
T4 FREE SERPL-MCNC: 1.2 NG/DL (ref 0.8–1.7)
TSI SER-ACNC: 0.64 MIU/ML (ref 0.55–4.78)

## 2024-01-22 PROCEDURE — 84443 ASSAY THYROID STIM HORMONE: CPT

## 2024-01-22 PROCEDURE — 36415 COLL VENOUS BLD VENIPUNCTURE: CPT

## 2024-01-22 PROCEDURE — 84481 FREE ASSAY (FT-3): CPT

## 2024-01-22 PROCEDURE — 84439 ASSAY OF FREE THYROXINE: CPT

## 2024-02-08 ENCOUNTER — HOSPITAL ENCOUNTER (EMERGENCY)
Facility: HOSPITAL | Age: 24
Discharge: HOME | End: 2024-02-09
Attending: EMERGENCY MEDICINE
Payer: MEDICAID

## 2024-02-08 DIAGNOSIS — N83.10 CORPUS LUTEUM CYST: Primary | ICD-10-CM

## 2024-02-08 LAB
ALBUMIN SERPL-MCNC: 4.5 G/DL (ref 3.2–4.8)
ALBUMIN/GLOB SERPL: 1.4 {RATIO} (ref 1–2)
ALP LIVER SERPL-CCNC: 71 U/L
ALT SERPL-CCNC: 8 U/L
ANION GAP SERPL CALC-SCNC: 7 MMOL/L (ref 0–18)
AST SERPL-CCNC: 16 U/L (ref ?–34)
B-HCG UR QL: NEGATIVE
BASOPHILS # BLD AUTO: 0.07 X10(3) UL (ref 0–0.2)
BASOPHILS NFR BLD AUTO: 0.8 %
BILIRUB SERPL-MCNC: 0.4 MG/DL (ref 0.3–1.2)
BILIRUB UR QL: NEGATIVE
BUN BLD-MCNC: 10 MG/DL (ref 9–23)
BUN/CREAT SERPL: 11.6 (ref 10–20)
CALCIUM BLD-MCNC: 9.3 MG/DL (ref 8.7–10.4)
CHLORIDE SERPL-SCNC: 108 MMOL/L (ref 98–112)
CLARITY UR: CLEAR
CO2 SERPL-SCNC: 24 MMOL/L (ref 21–32)
CREAT BLD-MCNC: 0.86 MG/DL
DEPRECATED RDW RBC AUTO: 37.8 FL (ref 35.1–46.3)
EGFRCR SERPLBLD CKD-EPI 2021: 98 ML/MIN/1.73M2 (ref 60–?)
EOSINOPHIL # BLD AUTO: 0.21 X10(3) UL (ref 0–0.7)
EOSINOPHIL NFR BLD AUTO: 2.3 %
ERYTHROCYTE [DISTWIDTH] IN BLOOD BY AUTOMATED COUNT: 13.3 % (ref 11–15)
GLOBULIN PLAS-MCNC: 3.2 G/DL (ref 2.8–4.4)
GLUCOSE BLD-MCNC: 110 MG/DL (ref 70–99)
GLUCOSE UR-MCNC: NORMAL MG/DL
HCT VFR BLD AUTO: 39.3 %
HGB BLD-MCNC: 13.3 G/DL
HGB UR QL STRIP.AUTO: NEGATIVE
IMM GRANULOCYTES # BLD AUTO: 0.01 X10(3) UL (ref 0–1)
IMM GRANULOCYTES NFR BLD: 0.1 %
KETONES UR-MCNC: NEGATIVE MG/DL
LEUKOCYTE ESTERASE UR QL STRIP.AUTO: NEGATIVE
LIPASE SERPL-CCNC: 46 U/L (ref 13–75)
LYMPHOCYTES # BLD AUTO: 3.32 X10(3) UL (ref 1–4)
LYMPHOCYTES NFR BLD AUTO: 36.7 %
MCH RBC QN AUTO: 26.5 PG (ref 26–34)
MCHC RBC AUTO-ENTMCNC: 33.8 G/DL (ref 31–37)
MCV RBC AUTO: 78.4 FL
MONOCYTES # BLD AUTO: 0.52 X10(3) UL (ref 0.1–1)
MONOCYTES NFR BLD AUTO: 5.7 %
NEUTROPHILS # BLD AUTO: 4.92 X10 (3) UL (ref 1.5–7.7)
NEUTROPHILS # BLD AUTO: 4.92 X10(3) UL (ref 1.5–7.7)
NEUTROPHILS NFR BLD AUTO: 54.4 %
NITRITE UR QL STRIP.AUTO: NEGATIVE
OSMOLALITY SERPL CALC.SUM OF ELEC: 288 MOSM/KG (ref 275–295)
PH UR: 6 [PH] (ref 5–8)
PLATELET # BLD AUTO: 309 10(3)UL (ref 150–450)
POTASSIUM SERPL-SCNC: 4.1 MMOL/L (ref 3.5–5.1)
PROT SERPL-MCNC: 7.7 G/DL (ref 5.7–8.2)
PROT UR-MCNC: NEGATIVE MG/DL
RBC # BLD AUTO: 5.01 X10(6)UL
SODIUM SERPL-SCNC: 139 MMOL/L (ref 136–145)
SP GR UR STRIP: 1.02 (ref 1–1.03)
UROBILINOGEN UR STRIP-ACNC: NORMAL
WBC # BLD AUTO: 9.1 X10(3) UL (ref 4–11)

## 2024-02-08 PROCEDURE — 83690 ASSAY OF LIPASE: CPT | Performed by: EMERGENCY MEDICINE

## 2024-02-08 PROCEDURE — 80053 COMPREHEN METABOLIC PANEL: CPT | Performed by: EMERGENCY MEDICINE

## 2024-02-08 PROCEDURE — 99285 EMERGENCY DEPT VISIT HI MDM: CPT

## 2024-02-08 PROCEDURE — 99284 EMERGENCY DEPT VISIT MOD MDM: CPT

## 2024-02-08 PROCEDURE — 81003 URINALYSIS AUTO W/O SCOPE: CPT | Performed by: EMERGENCY MEDICINE

## 2024-02-08 PROCEDURE — 81025 URINE PREGNANCY TEST: CPT | Performed by: EMERGENCY MEDICINE

## 2024-02-08 PROCEDURE — 85025 COMPLETE CBC W/AUTO DIFF WBC: CPT | Performed by: EMERGENCY MEDICINE

## 2024-02-08 PROCEDURE — 36415 COLL VENOUS BLD VENIPUNCTURE: CPT

## 2024-02-09 ENCOUNTER — APPOINTMENT (OUTPATIENT)
Dept: CT IMAGING | Facility: HOSPITAL | Age: 24
End: 2024-02-09
Attending: EMERGENCY MEDICINE
Payer: MEDICAID

## 2024-02-09 ENCOUNTER — TELEPHONE (OUTPATIENT)
Dept: FAMILY MEDICINE CLINIC | Facility: CLINIC | Age: 24
End: 2024-02-09

## 2024-02-09 VITALS
SYSTOLIC BLOOD PRESSURE: 132 MMHG | WEIGHT: 130 LBS | OXYGEN SATURATION: 99 % | RESPIRATION RATE: 18 BRPM | HEART RATE: 72 BPM | HEIGHT: 62 IN | DIASTOLIC BLOOD PRESSURE: 70 MMHG | BODY MASS INDEX: 23.92 KG/M2 | TEMPERATURE: 98 F

## 2024-02-09 DIAGNOSIS — R10.30 LOWER ABDOMINAL PAIN: Primary | ICD-10-CM

## 2024-02-09 LAB — B-HCG UR QL: NEGATIVE

## 2024-02-09 PROCEDURE — 81025 URINE PREGNANCY TEST: CPT

## 2024-02-09 PROCEDURE — 74177 CT ABD & PELVIS W/CONTRAST: CPT | Performed by: EMERGENCY MEDICINE

## 2024-02-09 RX ORDER — TRAMADOL HYDROCHLORIDE 50 MG/1
50 TABLET ORAL EVERY 6 HOURS PRN
Qty: 20 TABLET | Refills: 0 | Status: SHIPPED | OUTPATIENT
Start: 2024-02-09

## 2024-02-09 NOTE — TELEPHONE ENCOUNTER
Dr. Welsh/Dr. Quintero- please advise if patient needs to be seen  (no available appointments) or another pain medication can be sent. Thank you    Clinical Impression:  1. Corpus luteum cyst       Patient seen in ED yesterday for ruptured ovarian cyst continues to have pain  Can not eat, drink water, sleep, abdominal distention/bloating due to pain/ruptured cyst    Heating pad makes it worse  Was discharged home Ketoralac 10 mg twice daily with no relief    No fevers    Routed to PCP and Pod Mate in Office as PCP out of office and patient is requesting an appointment or pain medication for symptoms

## 2024-02-09 NOTE — ED PROVIDER NOTES
Patient Seen in: [facility] Emergency Department    History     Chief Complaint   Patient presents with    Abdomen/Flank Pain       HPI    History is provided by patient/independent historian: Patient  23-year-old female with no significant past medical history here with complaints of right lower quadrant abdominal pain for the past 2 days.  She has associated bloating and tactile fever yesterday.  She has some nausea without any vomiting.  No changes in bowel movements.  No urinary symptoms.  No vaginal bleeding or discharge.  No concern for STI.    History reviewed.   Past Medical History:   Diagnosis Date    Acne 10/23/2012    Amenorrhea     Anemia, postpartum 2023    Anemia, postpartum 2023    Contusion of right hand, initial encounter 2016    COVID-19 affecting pregnancy in second trimester 10/20/2022    COVID and strep positive 10/19/2022 Weekly NSTs    Depression     therapy & meds not currently    Dysmenorrhea     Dysmenorrhea 2012    Elevated BP without diagnosis of hypertension 2022- sent to triage for serial Bps and PreE labs    Heart murmur     no abx needed    History of chicken pox     as a child    Left knee injury 2016     (normal spontaneous vaginal delivery)     Other ill-defined conditions(799.89)     PET @ 1 yr    Pregnancy 2022    Support: FOB and mother Pain management: Unmedicated, no water birth Vitamin K: yes Erythromycin ointment: yes Placenta: Discard Circumcision: Yes Peds: House peds, has chosen Dr. Cash Housing/car seat: yes/yes Contraception:yes Feeding method:  Breast Pump:  Has one    Pyelonephritis during pregnancy 2022    E. Coli UTI x2 during pregnancy Admitted on  with pyelo Rx sent for keflex 500mg daily at bed time for suppression ABX had been discontinued d/t recurrent yeast infections    Quadriceps tendinitis 2017    Urinary tract infection in mother during first trimester of pregnancy 2022     UTI 06/21 [facility]_______________________         History reviewed.   Past Surgical History:   Procedure Laterality Date    MYRINGOTOMY, LASER-ASSISTED  2001    and tubes     WISDOM TEETH REMOVED           Home Medications reviewed :  (Not in a hospital admission)        History reviewed.   Social History     Socioeconomic History    Marital status: Single     Spouse name: Mao Pringle    Number of children: 0    Years of education: 12    Highest education level: High school graduate   Occupational History    Occupation: [employer]   Tobacco Use    Smoking status: Never    Smokeless tobacco: Never   Vaping Use    Vaping Use: Former   Substance and Sexual Activity    Alcohol use: Not Currently     Alcohol/week: 0.0 standard drinks of alcohol     Comment: rarely prior to pregnancy    Drug use: No    Sexual activity: Not Currently     Partners: Male   Other Topics Concern    Reaction to local anesthetic No     Service No    Caffeine Concern No    Stress Concern No    Special Diet No    Exercise Yes     Comment: walk, running, cardio    Seat Belt Yes         ROS  Review of Systems   Respiratory:  Negative for shortness of breath.    Cardiovascular:  Negative for chest pain.   Gastrointestinal:  Positive for abdominal distention, abdominal pain and nausea.   All other systems reviewed and are negative.     All other pertinent organ systems are reviewed and are negative.      Physical Exam     ED Triage Vitals [02/08/24 2128]   /89   Pulse 70   Resp 19   Temp 97.9 °F (36.6 °C)   Temp src Oral   SpO2 99 %   O2 Device None (Room air)     Vital signs reviewed.      Physical Exam  Vitals and nursing note reviewed.   Cardiovascular:      Pulses: Normal pulses.   Pulmonary:      Effort: No respiratory distress.   Abdominal:      General: There is no distension.      Tenderness: There is abdominal tenderness in the right lower quadrant.   Neurological:      Mental Status: She is alert.         ED Course       Labs:      Labs Reviewed   COMP METABOLIC PANEL (14) - Abnormal; Notable for the following components:       Result Value    Glucose 110 (*)     ALT 8 (*)     All other components within normal limits   CBC W/ DIFFERENTIAL - Abnormal; Notable for the following components:    MCV 78.4 (*)     All other components within normal limits   LIPASE - Normal   PREGNANCY TEST, URINE - Normal   POCT PREGNANCY URINE - Normal   CBC WITH DIFFERENTIAL WITH PLATELET    Narrative:     The following orders were created for panel order CBC With Differential With Platelet.                  Procedure                               Abnormality         Status                                     ---------                               -----------         ------                                     CBC W/ DIFFERENTIAL[[id number]]          Abnormal            Final result                                                 Please view results for these tests on the individual orders.   URINALYSIS WITH CULTURE REFLEX   RAINBOW DRAW LAVENDER   RAINBOW DRAW LIGHT GREEN   RAINBOW DRAW BLUE         My EKG Interpretation:   As reviewed and Interpreted by me      Imaging Results Available and Reviewed while in ED:   No results found.  CT ABDOMEN AND PELVIS WITH IV CONTRAST    IMPRESSION      Corpus luteum in the right ovary and mild free fluid.     Normal appendix axial images  coronal images 27-26  No renal or obstructing ureteral stones.  No hydronephrosis or hydroureter.    Mild stool in the colon but no evidence for obstruction.  Gallbladder is unremarkable.  No obvious stones but CT has a diminished sensitivity for noncalcified gallstones.  No biliary dilatation.  No adnexal masses or significant free fluid  Trace bilateral pleural effusions      Case faxed/finalized at 1:25 AM eastern time .  If there are any questions please contact me at [phone].            Joel Snowden M.D.  This report has been electronically signed and verified by the  Radiologist whose name is printed above.    DD:  2024/DT:  2024    My review and independent interpretation of CT images: no free air. Radiology report corroborates this in addition to other details as reported by them.      Decision rules/scores evaluated: none      Diagnostic labs/tests considered but not ordered: none    ED Medications Administered:   Medications   iopamidol 76% (ISOVUE-370) injection for power injector (60 mL Intravenous Given 24 000)                MDM       Medical Decision Making      Differential Diagnosis: After obtaining the patient's history, performing the physical exam and reviewing the diagnostics, multiple initial diagnoses were considered based on the presenting problem including gastroenteritis, appendicitis, UTI, ovarian cyst, nephrolithiasis    External document review: I personally reviewed available external medical records for any recent pertinent discharge summaries, testing, and procedures - the findings are as follows: 23 visit with Dr. Bronson for head injury    Complicating Factors: The patient already  has a past medical history of Acne (10/23/2012), Amenorrhea, Anemia, postpartum (2023), Anemia, postpartum (2023), Contusion of right hand, initial encounter (2016), COVID-19 affecting pregnancy in second trimester (10/20/2022), Depression, Dysmenorrhea, Dysmenorrhea (2012), Elevated BP without diagnosis of hypertension (2022), Heart murmur, History of chicken pox, Left knee injury (2016),  (normal spontaneous vaginal delivery), Other ill-defined conditions(799.89), Pregnancy (2022), Pyelonephritis during pregnancy (2022), Quadriceps tendinitis (2017), and Urinary tract infection in mother during first trimester of pregnancy (2022). to contribute to the complexity of this ED evaluation.    Procedures performed: none    Discussed management with physician/appropriate source: none    Considered  admission/deescalation of care for: none    Social determinants of health affecting patient care: none    Prescription medications considered: toradol, discussed continuing current medication regimen    The patient requires continuous monitoring for: abdominal pain    Shared decision making: discussed possible admission        Disposition and Plan     Clinical Impression:  1. Corpus luteum cyst        Disposition:  Discharge    Follow-up:  Adrian Quintero MD  [address] [place] [id number] [phone]    Follow up        Medications Prescribed:  Discharge Medication List as of 2/9/2024  1:42 AM

## 2024-02-09 NOTE — ED INITIAL ASSESSMENT (HPI)
Pt reports RLQ abd pain since yesterday with bloating, pt reports fever yesterday. Pt reports nausea and states pain is worse after eating.

## 2024-03-16 ENCOUNTER — HOSPITAL ENCOUNTER (EMERGENCY)
Facility: HOSPITAL | Age: 24
Discharge: HOME | End: 2024-03-16
Attending: EMERGENCY MEDICINE
Payer: MEDICAID

## 2024-03-16 VITALS
RESPIRATION RATE: 16 BRPM | BODY MASS INDEX: 24 KG/M2 | SYSTOLIC BLOOD PRESSURE: 131 MMHG | WEIGHT: 130 LBS | HEART RATE: 92 BPM | DIASTOLIC BLOOD PRESSURE: 84 MMHG | OXYGEN SATURATION: 99 % | TEMPERATURE: 100 F

## 2024-03-16 DIAGNOSIS — R11.2 NAUSEA AND VOMITING IN ADULT: ICD-10-CM

## 2024-03-16 DIAGNOSIS — J10.1 INFLUENZA B: Primary | ICD-10-CM

## 2024-03-16 LAB
B-HCG UR QL: NEGATIVE
FLUAV + FLUBV RNA SPEC NAA+PROBE: NEGATIVE
FLUAV + FLUBV RNA SPEC NAA+PROBE: POSITIVE
RSV RNA SPEC NAA+PROBE: NEGATIVE
SARS-COV-2 RNA RESP QL NAA+PROBE: NOT DETECTED

## 2024-03-16 PROCEDURE — 99284 EMERGENCY DEPT VISIT MOD MDM: CPT

## 2024-03-16 PROCEDURE — 96375 TX/PRO/DX INJ NEW DRUG ADDON: CPT

## 2024-03-16 PROCEDURE — 0241U SARS-COV-2/FLU A AND B/RSV BY PCR (GENEXPERT): CPT | Performed by: EMERGENCY MEDICINE

## 2024-03-16 PROCEDURE — 81025 URINE PREGNANCY TEST: CPT

## 2024-03-16 PROCEDURE — 0241U SARS-COV-2/FLU A AND B/RSV BY PCR (GENEXPERT): CPT

## 2024-03-16 PROCEDURE — 96361 HYDRATE IV INFUSION ADD-ON: CPT

## 2024-03-16 PROCEDURE — 96374 THER/PROPH/DIAG INJ IV PUSH: CPT

## 2024-03-16 RX ORDER — ONDANSETRON 4 MG/1
4 TABLET, ORALLY DISINTEGRATING ORAL EVERY 4 HOURS PRN
Qty: 10 TABLET | Refills: 0 | Status: SHIPPED | OUTPATIENT
Start: 2024-03-16 | End: 2024-03-23

## 2024-03-16 RX ORDER — DICYCLOMINE HCL 20 MG
20 TABLET ORAL 4 TIMES DAILY PRN
Qty: 30 TABLET | Refills: 0 | Status: SHIPPED | OUTPATIENT
Start: 2024-03-16 | End: 2024-04-15

## 2024-03-16 RX ORDER — IBUPROFEN 600 MG/1
600 TABLET ORAL EVERY 8 HOURS PRN
Qty: 30 TABLET | Refills: 0 | Status: SHIPPED | OUTPATIENT
Start: 2024-03-16 | End: 2024-03-23

## 2024-03-16 RX ORDER — KETOROLAC TROMETHAMINE 15 MG/ML
15 INJECTION, SOLUTION INTRAMUSCULAR; INTRAVENOUS ONCE
Status: COMPLETED | OUTPATIENT
Start: 2024-03-16 | End: 2024-03-16

## 2024-03-16 RX ORDER — ONDANSETRON 2 MG/ML
4 INJECTION INTRAMUSCULAR; INTRAVENOUS ONCE
Status: COMPLETED | OUTPATIENT
Start: 2024-03-16 | End: 2024-03-16

## 2024-03-16 NOTE — ED PROVIDER NOTES
Patient Seen in: [facility] Emergency Department      History     Chief Complaint   Patient presents with    Fever     Stated Complaint: fever    Subjective:   HPI    23-year-old female presents for evaluation for fevers, nausea, vomiting, abdominal pain body aches headache and cough for the past 3 days.  Emesis is nonbloody nonbilious.  Abdominal pain is generalized and cramping.    Objective:   Past Medical History:   Diagnosis Date    Acne 10/23/2012    Amenorrhea     Anemia, postpartum ([facility]) 2023    Anemia, postpartum ([facility]) 2023    Contusion of right hand, initial encounter 2016    COVID-19 affecting pregnancy in second trimester ([facility]) 10/20/2022    COVID and strep positive 10/19/2022 Weekly NSTs    Depression     therapy & meds not currently    Dysmenorrhea     Dysmenorrhea 2012    Elevated BP without diagnosis of hypertension 2022- sent to triage for serial Bps and PreE labs    Heart murmur     no abx needed    History of chicken pox     as a child    Left knee injury 2016     (normal spontaneous vaginal delivery) ([facility])     Other ill-defined conditions(799.89)     PET @ 1 yr    Pregnancy ([facility]) 2022    Support: FOB and mother Pain management: Unmedicated, no water birth Vitamin K: yes Erythromycin ointment: yes Placenta: Discard Circumcision: Yes Peds: House peds, has chosen Dr. Cash Housing/car seat: yes/yes Contraception:yes Feeding method:  Breast Pump:  Has one    Pyelonephritis during pregnancy ([facility]) 2022    E. Coli UTI x2 during pregnancy Admitted on  with pyelo Rx sent for keflex 500mg daily at bed time for suppression ABX had been discontinued d/t recurrent yeast infections    Quadriceps tendinitis 2017    Urinary tract infection in mother during first trimester of pregnancy ([facility]) 2022    UTI  [facility]_______________________              Past Surgical History:   Procedure Laterality Date    MYRINGOTOMY, LASER-ASSISTED   2001    and tubes     WISDOM TEETH REMOVED                  Social History     Socioeconomic History    Marital status: Single     Spouse name: Mao Pringle    Number of children: 0    Years of education: 12    Highest education level: High school graduate   Occupational History    Occupation:    Tobacco Use    Smoking status: Never    Smokeless tobacco: Never   Vaping Use    Vaping Use: Former   Substance and Sexual Activity    Alcohol use: Not Currently     Alcohol/week: 0.0 standard drinks of alcohol     Comment: rarely prior to pregnancy    Drug use: No    Sexual activity: Not Currently     Partners: Male   Other Topics Concern    Reaction to local anesthetic No     Service No    Caffeine Concern No    Stress Concern No    Special Diet No    Exercise Yes     Comment: walk, running, cardio    Seat Belt Yes              Review of Systems    Positive for stated complaint: fever  Other systems are as noted in HPI.  Constitutional and vital signs reviewed.      All other systems reviewed and negative except as noted above.    Physical Exam     ED Triage Vitals   BP 03/16/24 1323 120/74   Pulse 03/16/24 1323 106   Resp 03/16/24 1452 16   Temp 03/16/24 1323 99.6 °F (37.6 °C)   Temp src 03/16/24 1323 Oral   SpO2 03/16/24 1323 100 %   O2 Device 03/16/24 1323 None (Room air)       Current:/84   Pulse 92   Temp 99.6 °F (37.6 °C) (Oral)   Resp 16   Wt 59 kg   LMP 02/14/2024   SpO2 99%   BMI 23.78 kg/m²         Physical Exam  Vitals and nursing note reviewed.   Constitutional:       General: She is not in acute distress.     Appearance: Normal appearance.   HENT:      Head: Normocephalic and atraumatic.      Jaw: No trismus.      Right Ear: Tympanic membrane normal. No mastoid tenderness.      Left Ear: Tympanic membrane normal. No mastoid tenderness.      Nose: Nose normal. No nasal deformity.      Right Nostril: No epistaxis.      Left Nostril: No epistaxis.      Mouth/Throat:      Lips: Pink.       Mouth: Mucous membranes are dry. No angioedema.      Pharynx: Oropharynx is clear. Uvula midline. No oropharyngeal exudate, posterior oropharyngeal erythema or uvula swelling.      Tonsils: No tonsillar exudate or tonsillar abscesses.   Eyes:      General: Lids are normal.      Extraocular Movements: Extraocular movements intact.      Conjunctiva/sclera: Conjunctivae normal.      Pupils: Pupils are equal, round, and reactive to light.   Cardiovascular:      Rate and Rhythm: Regular rhythm. Tachycardia present.      Pulses: Normal pulses.      Heart sounds: Normal heart sounds.   Pulmonary:      Effort: Pulmonary effort is normal. No respiratory distress.      Breath sounds: Normal breath sounds and air entry.   Abdominal:      General: Bowel sounds are normal.      Palpations: Abdomen is soft.      Tenderness: There is generalized abdominal tenderness. There is no guarding or rebound.   Musculoskeletal:         General: No deformity. Normal range of motion.      Cervical back: Normal range of motion. No rigidity.   Skin:     General: Skin is warm and dry.      Coloration: Skin is not cyanotic.   Neurological:      General: No focal deficit present.      Mental Status: She is alert. Mental status is at baseline.      Motor: Motor function is intact.      Gait: Gait is intact.   Psychiatric:         Attention and Perception: Attention normal.         Mood and Affect: Mood normal.         Speech: Speech normal.               ED Course     Labs Reviewed   SARS-COV-2/FLU A AND B/RSV BY PCR (GENEXPERT) - Abnormal; Notable for the following components:       Result Value    Influenza B by PCR Positive (*)     All other components within normal limits    Narrative:     This test is intended for the qualitative detection and differentiation of SARS-CoV-2, influenza A, influenza B, and respiratory syncytial virus (RSV) viral RNA in nasopharyngeal or nares swabs from individuals suspected of respiratory viral infection  consistent with COVID-19 by their healthcare provider. Signs and symptoms of respiratory viral infection due to SARS-CoV-2, influenza, and RSV can be similar.    Test performed using the Xpert Xpress SARS-CoV-2/FLU/RSV (real time RT-PCR)  assay on the GeneXpert instrument, [employer], [place] [address].   This test is being used under the Food and Drug Administration's Emergency Use Authorization.    The authorized Fact Sheet for Healthcare Providers for this assay is available upon request from the laboratory.   POCT PREGNANCY URINE - Normal                      MDM                                         Medical Decision Making  Differential diagnosis includes but is not limited to influenza, COVID, RSV, other viral illness, gastroenteritis, etc.    Patient's abdominal exam is benign.  She is positive for influenza.  Patient was feeling better after IV fluids, Zofran and Toradol.  She be discharged home with prescriptions for Bentyl and Zofran.  Supportive care is encouraged.  Hydration encouraged.  Return precautions given.    Medical Record Review: I personally reviewed available prior medical records for any recent pertinent discharge summaries, testing, and procedures, and reviewed those reports.    Complicating factors: The patient  has a past medical history of Acne (10/23/2012), Amenorrhea, Anemia, postpartum ([facility]) (2023), Anemia, postpartum ([facility]) (2023), Contusion of right hand, initial encounter (2016), COVID-19 affecting pregnancy in second trimester ([facility]) (10/20/2022), Depression, Dysmenorrhea, Dysmenorrhea (2012), Elevated BP without diagnosis of hypertension (2022), Heart murmur, History of chicken pox, Left knee injury (2016),  (normal spontaneous vaginal delivery) ([facility]), Other ill-defined conditions(799.89), Pregnancy ([facility]) (2022), Pyelonephritis during pregnancy ([facility]) (2022), Quadriceps tendinitis (2017), and Urinary tract infection in mother during  first trimester of pregnancy ([facility]) (6/21/2022). and  has a past surgical history that includes myringotomy, laser-assisted (2001) and wisdom teeth removed. that contribute to the medical complexity of this ED evaluation.     Clinical impression as well as any lab results and radiology findings were discussed with the patient and/or caregiver. I personally reviewed all laboratory results and radiology images myself. Patient is advised to follow up with PCP for reevaluation. I provided discharge instructions and return precautions. Patient and/or caregiver voices understanding and agreement with the treatment plan. All questions were addressed and answered.             Problems Addressed:  Influenza B: acute illness or injury with systemic symptoms  Nausea and vomiting in adult: acute illness or injury with systemic symptoms    Amount and/or Complexity of Data Reviewed  Labs: ordered. Decision-making details documented in ED Course.    Risk  Prescription drug management.        Disposition and Plan     Clinical Impression:  1. Influenza B    2. Nausea and vomiting in adult         Disposition:  Discharge  3/16/2024  5:02 pm    Follow-up:  Adrian Quintero MD  [address] [place] [id number] [phone]    Follow up            Medications Prescribed:  Current Discharge Medication List        START taking these medications    Details   ondansetron 4 MG Oral Tablet Dispersible Take 1 tablet (4 mg total) by mouth every 4 (four) hours as needed for Nausea.  Qty: 10 tablet, Refills: 0      ibuprofen 600 MG Oral Tab Take 1 tablet (600 mg total) by mouth every 8 (eight) hours as needed for Pain or Fever.  Qty: 30 tablet, Refills: 0      dicyclomine 20 MG Oral Tab Take 1 tablet (20 mg total) by mouth 4 (four) times daily as needed (abdominal cramping).  Qty: 30 tablet, Refills: 0

## 2024-07-13 ENCOUNTER — HOSPITAL ENCOUNTER (OUTPATIENT)
Age: 24
Discharge: HOME | End: 2024-07-13
Payer: MEDICAID

## 2024-07-13 VITALS
SYSTOLIC BLOOD PRESSURE: 124 MMHG | TEMPERATURE: 98 F | DIASTOLIC BLOOD PRESSURE: 77 MMHG | HEART RATE: 72 BPM | RESPIRATION RATE: 18 BRPM | OXYGEN SATURATION: 99 %

## 2024-07-13 DIAGNOSIS — J02.9 VIRAL PHARYNGITIS: Primary | ICD-10-CM

## 2024-07-13 LAB — S PYO AG THROAT QL: NEGATIVE

## 2024-07-13 RX ORDER — BENZOCAINE AND MENTHOL, UNSPECIFIED FORM 15; 2.3 MG/1; MG/1
1 LOZENGE ORAL AS NEEDED
Qty: 16 LOZENGE | Refills: 0 | Status: SHIPPED | OUTPATIENT
Start: 2024-07-13

## 2024-07-13 NOTE — ED PROVIDER NOTES
Patient Seen in: [facility]      History     Chief Complaint   Patient presents with    Sore Throat     Stated Complaint: SORE THROAT  Subjective:   HPI    This is a well-appearing 24-year-old female with no significant past medical history presents with a chief complaint of sore throat.  Patient states sore throat for the last few days.  Denies any fever or chills.  No posterior neck pain or neck stiffness.  No rash.  No cough congestion or other URI symptoms.  Noticed white exudate on the right tonsil which prompted the visit today.  No sick contacts.    Objective:   Past Medical History:    Acne    Amenorrhea    Anemia, postpartum ([facility])    Anemia, postpartum ([facility])    Contusion of right hand, initial encounter    COVID-19 affecting pregnancy in second trimester ([facility])    COVID and strep positive 10/19/2022 Weekly NSTs    Depression    therapy & meds not currently    Dysmenorrhea    Dysmenorrhea    Elevated BP without diagnosis of hypertension    23- sent to triage for serial Bps and PreE labs    Heart murmur    no abx needed    History of chicken pox    as a child    Left knee injury     (normal spontaneous vaginal delivery) ([facility])    Other ill-defined conditions(799.89)    PET @ 1 yr    Pregnancy ([facility])    Support: FOB and mother Pain management: Unmedicated, no water birth Vitamin K: yes Erythromycin ointment: yes Placenta: Discard Circumcision: Yes Peds: House peds, has chosen Dr. Cash Housing/car seat: yes/yes Contraception:yes Feeding method:  Breast Pump:  Has one    Pyelonephritis during pregnancy ([facility])    E. Coli UTI x2 during pregnancy Admitted on  with pyelo Rx sent for keflex 500mg daily at bed time for suppression ABX had been discontinued d/t recurrent yeast infections    Quadriceps tendinitis    Urinary tract infection in mother during first trimester of pregnancy ([facility])    UTI  ISH NV_______________________            Past Surgical History:   Procedure Laterality Date     Myringotomy, laser-assisted  2001    and tubes     [place] teeth removed                No pertinent social history.          Review of Systems   HENT:  Positive for sore throat.    All other systems reviewed and are negative.      Positive for stated complaint: Sore Throat    Other systems are as noted in HPI.  Constitutional and vital signs reviewed.      All other systems reviewed and negative except as noted above.    Physical Exam     ED Triage Vitals [07/13/24 0946]   /77   Pulse 72   Resp 18   Temp 98.2 °F (36.8 °C)   Temp src Temporal   SpO2 99 %   O2 Device None (Room air)     Current:/77   Pulse 72   Temp 98.2 °F (36.8 °C) (Temporal)   Resp 18   LMP 06/29/2024   SpO2 99%     Physical Exam  Vitals and nursing note reviewed.   Constitutional:       General: She is awake. She is not in acute distress.     Appearance: Normal appearance. She is not ill-appearing, toxic-appearing or diaphoretic.   HENT:      Head: Normocephalic and atraumatic.      Right Ear: Tympanic membrane, ear canal and external ear normal. No tenderness. No middle ear effusion. Tympanic membrane is not erythematous.      Left Ear: Tympanic membrane, ear canal and external ear normal. No tenderness.  No middle ear effusion. Tympanic membrane is not erythematous.      Nose: Nose normal.      Mouth/Throat:      Lips: Pink.      Mouth: Mucous membranes are moist. No oral lesions.      Pharynx: Oropharynx is clear. Uvula midline. Posterior oropharyngeal erythema present. No pharyngeal swelling, oropharyngeal exudate or uvula swelling.   Eyes:      General: Lids are normal.      Extraocular Movements: Extraocular movements intact.      Conjunctiva/sclera: Conjunctivae normal.      Pupils: Pupils are equal, round, and reactive to light.   Cardiovascular:      Rate and Rhythm: Normal rate and regular rhythm.      Pulses: Normal pulses.      Heart sounds: Normal heart sounds.   Pulmonary:      Effort: Pulmonary effort is normal.       Breath sounds: Normal breath sounds and air entry. No stridor, decreased air movement or transmitted upper airway sounds.   Musculoskeletal:      Cervical back: Full passive range of motion without pain and normal range of motion.   Skin:     General: Skin is warm and dry.      Capillary Refill: Capillary refill takes less than 2 seconds.   Neurological:      General: No focal deficit present.      Mental Status: She is alert and oriented to person, place, and time.   Psychiatric:         Mood and Affect: Mood normal.         Behavior: Behavior normal. Behavior is cooperative.         Thought Content: Thought content normal.         Judgment: Judgment normal.         ED Course     No results found.  Labs Reviewed   POCT RAPID STREP - Normal   Strep and reevaluate  Strep negative.  MDM     Medical Decision Making  Differential diagnoses reflecting the complexity of care include but are not limited to viral versus bacterial pharyngitis.    Comorbidities that add complexity to management include: N/A  History obtained by an independent source was from: N/A  Discussions of management was done with: N/A  My independent interpretations of studies include: N/A  Shared decision making was done by: Patient and myself  Patient is well appearing, non-toxic and in no acute distress.  Vital signs are stable.  Patient with clear speech, no drooling, easy respirations.  Discussed the rapid strep test with the patient.  No clear indication for antibiotics at this time this is most likely viral.  Will send Cepacol to the pharmacy.  Close follow-up with PCP was recommended.  Patient verbalized plan of care and states understanding    Problems Addressed:  Viral pharyngitis: acute illness or injury    Risk  OTC drugs.  Prescription drug management.        Disposition and Plan     Clinical Impression:  1. Viral pharyngitis         Disposition:  Discharge  7/13/2024  9:56 am    Follow-[facility]:  [facility] [address]  [phone]                Medications Prescribed:  Discharge Medication List as of 7/13/2024 10:02 AM        START taking these medications    Details   Benzocaine-Menthol (CEPACOL) 15-2.3 MG Mouth/Throat Lozenge Use as directed 1 lozenge in the mouth or throat as needed., Normal, Disp-16 lozenge, R-0                Note to patient: The 21st Century cares act makes medical notes like these available to patients in the interest of transparency.  However, be advised this medical document and is intended as peer to peer communication.  It is read the medical language and may contain abbreviations or verbiage that are unfamiliar.  It may appear blunt or direct.  Medical documents are intended to carry relevant information, fax is evident and the clinical opinion of the practitioner.    This note was prepared using Dragon Medical voice recognition dictation software.  As a result, errors may occur.  When identified, these errors have been corrected.  While every attempt is made to correct errors during dictation, discrepancies may still exist.    Maribeth Berrios, TOYA  7/13/2024  9:56 AM

## 2024-08-30 ENCOUNTER — OFFICE VISIT (OUTPATIENT)
Dept: OBGYN CLINIC | Facility: CLINIC | Age: 24
End: 2024-08-30

## 2024-08-30 ENCOUNTER — LAB ENCOUNTER (OUTPATIENT)
Dept: LAB | Facility: HOSPITAL | Age: 24
End: 2024-08-30
Attending: INTERNAL MEDICINE
Payer: MEDICAID

## 2024-08-30 VITALS
BODY MASS INDEX: 24.81 KG/M2 | HEART RATE: 76 BPM | DIASTOLIC BLOOD PRESSURE: 80 MMHG | WEIGHT: 134.81 LBS | SYSTOLIC BLOOD PRESSURE: 128 MMHG | HEIGHT: 62 IN

## 2024-08-30 DIAGNOSIS — N89.8 VAGINAL ODOR: Primary | ICD-10-CM

## 2024-08-30 DIAGNOSIS — E06.9 THYROIDITIS: ICD-10-CM

## 2024-08-30 DIAGNOSIS — R39.9 UTI SYMPTOMS: ICD-10-CM

## 2024-08-30 LAB
APPEARANCE: CLEAR
BILIRUBIN: NEGATIVE
GLUCOSE (URINE DIPSTICK): NEGATIVE MG/DL
KETONES (URINE DIPSTICK): NEGATIVE MG/DL
MULTISTIX LOT#: ABNORMAL NUMERIC
NITRITE, URINE: NEGATIVE
OCCULT BLOOD: NEGATIVE
PH, URINE: 7 (ref 4.5–8)
PROTEIN (URINE DIPSTICK): NEGATIVE MG/DL
SPECIFIC GRAVITY: 1.01 (ref 1–1.03)
TSI SER-ACNC: 1.1 MIU/ML (ref 0.55–4.78)
URINE-COLOR: YELLOW
UROBILINOGEN,SEMI-QN: 0.2 MG/DL (ref 0–1.9)

## 2024-08-30 PROCEDURE — 87086 URINE CULTURE/COLONY COUNT: CPT | Performed by: ADVANCED PRACTICE MIDWIFE

## 2024-08-30 PROCEDURE — 36415 COLL VENOUS BLD VENIPUNCTURE: CPT

## 2024-08-30 PROCEDURE — 81514 NFCT DS BV&VAGINITIS DNA ALG: CPT | Performed by: ADVANCED PRACTICE MIDWIFE

## 2024-08-30 PROCEDURE — 84443 ASSAY THYROID STIM HORMONE: CPT

## 2024-08-30 RX ORDER — METRONIDAZOLE 7.5 MG/G
1 GEL VAGINAL NIGHTLY
Qty: 1 EACH | Refills: 0 | Status: SHIPPED | OUTPATIENT
Start: 2024-08-30 | End: 2024-09-04

## 2024-08-30 RX ORDER — FLUCONAZOLE 150 MG/1
150 TABLET ORAL
Qty: 2 TABLET | Refills: 0 | Status: SHIPPED | OUTPATIENT
Start: 2024-08-30

## 2024-08-30 NOTE — PROGRESS NOTES
Chief Complaint   Patient presents with    Gyn Exam     Pt is here for suspected BV c/o fishy odor also has been trying to conceive for 6 months and would like to discuss other that.         HPI:   Patient presents with:  Gyn Exam: Vaginal irritation / discharge  Deysi Wilkinson is a female who presents for vaginal discharge/itch.      ONSET: 1 week(s) ago  PATTERN: unchanged    REVIEW OF SYSTEMS:   ROS NEGATIVES: She denies fever, abnormal vaginal bleeding, vaginal discharge, vaginal itching, and pelvic pain  ROS POSITIVES: She reports foul vaginal odor      EXAM:   /80 (BP Location: Right arm, Patient Position: Sitting, Cuff Size: adult)   Pulse 76   Ht 5' 2\" (1.575 m)   Wt 134 lb 12.8 oz (61.1 kg)   LMP 07/30/2024 (Exact Date)   Breastfeeding No   BMI 24.66 kg/m²   Estimated body mass index is 24.66 kg/m² as calculated from the following:    Height as of this encounter: 5' 2\" (1.575 m).    Weight as of this encounter: 134 lb 12.8 oz (61.1 kg). 5' 2\" (1.575 m)   Wt Readings from Last 6 Encounters:   08/30/24 134 lb 12.8 oz (61.1 kg)   03/16/24 130 lb (59 kg)   02/08/24 130 lb (59 kg)   11/08/23 134 lb (60.8 kg)   10/19/23 131 lb (59.4 kg)   09/17/23 130 lb (59 kg)      General Appearance: awake, alert, oriented, in no acute distress  Abdomen:  deferred  Pelvic Exam: vaginal discharge described as creamy, foul, and vaginal erythema noted, normal cervix without lesions, polyps or tenderness.    Labs performed this visit:  No results found for this or any previous visit (from the past 10 hour(s)).    ASSESSMENT AND PLAN:     Diagnoses and all orders for this visit:    Vaginal odor  -     metroNIDAZOLE 0.75 % Vaginal Gel; Place 1 Applicatorful vaginally nightly for 5 days.  -     fluconazole (DIFLUCAN) 150 MG Oral Tab; Take 1 tablet (150 mg total) by mouth every 3 (three) days. Take one now and repeat in 72 hours    UTI symptoms  -     URINALYSIS NONAUTO W/O SCOPE  -     Urine Culture, Routine;  Future     Probable BV  Desires pregnancy fertility awareness reviewed, Folate daily early pregnancy precautions  No follow-ups on file.     Maegan Gregory CNM   8/30/2024

## 2024-08-31 LAB
BV BACTERIA DNA VAG QL NAA+PROBE: POSITIVE
C GLABRATA DNA VAG QL NAA+PROBE: NEGATIVE
C KRUSEI DNA VAG QL NAA+PROBE: NEGATIVE
CANDIDA DNA VAG QL NAA+PROBE: NEGATIVE
T VAGINALIS DNA VAG QL NAA+PROBE: NEGATIVE

## 2024-10-17 NOTE — TELEPHONE ENCOUNTER
Detail Level: Generalized Rizatriptan 10 was refilled refilled on 3/10/20 for 30 day supply with 3 refills.

## 2024-10-18 ENCOUNTER — APPOINTMENT (OUTPATIENT)
Dept: ULTRASOUND IMAGING | Facility: HOSPITAL | Age: 24
End: 2024-10-18
Attending: EMERGENCY MEDICINE
Payer: MEDICAID

## 2024-10-18 ENCOUNTER — HOSPITAL ENCOUNTER (EMERGENCY)
Facility: HOSPITAL | Age: 24
Discharge: HOME | End: 2024-10-18
Attending: EMERGENCY MEDICINE
Payer: MEDICAID

## 2024-10-18 VITALS
HEIGHT: 63 IN | WEIGHT: 130 LBS | HEART RATE: 60 BPM | DIASTOLIC BLOOD PRESSURE: 79 MMHG | SYSTOLIC BLOOD PRESSURE: 119 MMHG | TEMPERATURE: 98 F | RESPIRATION RATE: 14 BRPM | OXYGEN SATURATION: 99 % | BODY MASS INDEX: 23.04 KG/M2

## 2024-10-18 DIAGNOSIS — R10.9 ABDOMINAL PAIN OF UNKNOWN ETIOLOGY: Primary | ICD-10-CM

## 2024-10-18 DIAGNOSIS — N83.202 CYST OF LEFT OVARY: ICD-10-CM

## 2024-10-18 LAB
ALBUMIN SERPL-MCNC: 4.8 G/DL (ref 3.2–4.8)
ALP LIVER SERPL-CCNC: 75 U/L
ALT SERPL-CCNC: <7 U/L
ANION GAP SERPL CALC-SCNC: 6 MMOL/L (ref 0–18)
AST SERPL-CCNC: 15 U/L (ref ?–34)
B-HCG UR QL: NEGATIVE
BASOPHILS # BLD AUTO: 0.07 X10(3) UL (ref 0–0.2)
BASOPHILS NFR BLD AUTO: 0.7 %
BILIRUB DIRECT SERPL-MCNC: 0.2 MG/DL (ref ?–0.3)
BILIRUB SERPL-MCNC: 0.5 MG/DL (ref 0.3–1.2)
BILIRUB UR QL: NEGATIVE
BUN BLD-MCNC: 10 MG/DL (ref 9–23)
BUN/CREAT SERPL: 11.2 (ref 10–20)
CALCIUM BLD-MCNC: 9.3 MG/DL (ref 8.7–10.4)
CHLORIDE SERPL-SCNC: 108 MMOL/L (ref 98–112)
CLARITY UR: CLEAR
CO2 SERPL-SCNC: 26 MMOL/L (ref 21–32)
CREAT BLD-MCNC: 0.89 MG/DL
DEPRECATED RDW RBC AUTO: 39.4 FL (ref 35.1–46.3)
EGFRCR SERPLBLD CKD-EPI 2021: 93 ML/MIN/1.73M2 (ref 60–?)
EOSINOPHIL # BLD AUTO: 0.22 X10(3) UL (ref 0–0.7)
EOSINOPHIL NFR BLD AUTO: 2.3 %
ERYTHROCYTE [DISTWIDTH] IN BLOOD BY AUTOMATED COUNT: 13.3 % (ref 11–15)
GLUCOSE BLD-MCNC: 96 MG/DL (ref 70–99)
GLUCOSE UR-MCNC: NORMAL MG/DL
HCT VFR BLD AUTO: 38.6 %
HGB BLD-MCNC: 13.1 G/DL
HGB UR QL STRIP.AUTO: NEGATIVE
IMM GRANULOCYTES # BLD AUTO: 0.02 X10(3) UL (ref 0–1)
IMM GRANULOCYTES NFR BLD: 0.2 %
KETONES UR-MCNC: NEGATIVE MG/DL
LEUKOCYTE ESTERASE UR QL STRIP.AUTO: NEGATIVE
LIPASE SERPL-CCNC: 41 U/L (ref 12–53)
LYMPHOCYTES # BLD AUTO: 2.57 X10(3) UL (ref 1–4)
LYMPHOCYTES NFR BLD AUTO: 27.4 %
MCH RBC QN AUTO: 27.7 PG (ref 26–34)
MCHC RBC AUTO-ENTMCNC: 33.9 G/DL (ref 31–37)
MCV RBC AUTO: 81.6 FL
MONOCYTES # BLD AUTO: 0.51 X10(3) UL (ref 0.1–1)
MONOCYTES NFR BLD AUTO: 5.4 %
NEUTROPHILS # BLD AUTO: 5.99 X10 (3) UL (ref 1.5–7.7)
NEUTROPHILS # BLD AUTO: 5.99 X10(3) UL (ref 1.5–7.7)
NEUTROPHILS NFR BLD AUTO: 64 %
NITRITE UR QL STRIP.AUTO: NEGATIVE
OSMOLALITY SERPL CALC.SUM OF ELEC: 289 MOSM/KG (ref 275–295)
PH UR: 6.5 [PH] (ref 5–8)
PLATELET # BLD AUTO: 285 10(3)UL (ref 150–450)
POTASSIUM SERPL-SCNC: 4 MMOL/L (ref 3.5–5.1)
PROT SERPL-MCNC: 8.1 G/DL (ref 5.7–8.2)
PROT UR-MCNC: NEGATIVE MG/DL
RBC # BLD AUTO: 4.73 X10(6)UL
SODIUM SERPL-SCNC: 140 MMOL/L (ref 136–145)
SP GR UR STRIP: 1.01 (ref 1–1.03)
UROBILINOGEN UR STRIP-ACNC: NORMAL
WBC # BLD AUTO: 9.4 X10(3) UL (ref 4–11)

## 2024-10-18 PROCEDURE — 99284 EMERGENCY DEPT VISIT MOD MDM: CPT

## 2024-10-18 PROCEDURE — 93975 VASCULAR STUDY: CPT | Performed by: EMERGENCY MEDICINE

## 2024-10-18 PROCEDURE — 81025 URINE PREGNANCY TEST: CPT

## 2024-10-18 PROCEDURE — 76856 US EXAM PELVIC COMPLETE: CPT | Performed by: EMERGENCY MEDICINE

## 2024-10-18 PROCEDURE — 81003 URINALYSIS AUTO W/O SCOPE: CPT | Performed by: EMERGENCY MEDICINE

## 2024-10-18 PROCEDURE — 96374 THER/PROPH/DIAG INJ IV PUSH: CPT

## 2024-10-18 PROCEDURE — 83690 ASSAY OF LIPASE: CPT | Performed by: EMERGENCY MEDICINE

## 2024-10-18 PROCEDURE — 85025 COMPLETE CBC W/AUTO DIFF WBC: CPT | Performed by: EMERGENCY MEDICINE

## 2024-10-18 PROCEDURE — 76830 TRANSVAGINAL US NON-OB: CPT | Performed by: EMERGENCY MEDICINE

## 2024-10-18 PROCEDURE — 99285 EMERGENCY DEPT VISIT HI MDM: CPT

## 2024-10-18 PROCEDURE — 80076 HEPATIC FUNCTION PANEL: CPT | Performed by: EMERGENCY MEDICINE

## 2024-10-18 PROCEDURE — 80048 BASIC METABOLIC PNL TOTAL CA: CPT | Performed by: EMERGENCY MEDICINE

## 2024-10-18 RX ORDER — KETOROLAC TROMETHAMINE 15 MG/ML
15 INJECTION, SOLUTION INTRAMUSCULAR; INTRAVENOUS ONCE
Status: COMPLETED | OUTPATIENT
Start: 2024-10-18 | End: 2024-10-18

## 2024-10-18 NOTE — ED PROVIDER NOTES
Patient Seen in: [facility] Emergency Department      History     Chief Complaint   Patient presents with    Abdomen/Flank Pain     Stated Complaint: cyst    Subjective:   Pt with History of anemia, dysmenorrhea 1 vaginal delivery in the past here with a right lower quadrant and back pain that she is said she has been getting for a few years.  It comes every 3 months.  Today it started about 2 hours ago.  It was more severe when it started then and is now but she still feels it.  It is aching and sharp and hurts worse with movement or touching her abdomen.  She denies nausea vomiting or fevers.  Denies diarrhea.  No dysuria urgency.  She said it radiates in to her back as well as her vaginal area on the right.  It is like this every time she gets it.  She does not come to the ER every time and the most recent time she came to the ER for this was about 8 months ago.  She has spoken to her midwife about this.  Denies any vaginal discharge.  She has the same partner.  LMP is always irregular and the last one was 3 weeks ago.  She feels this is from ovarian cyst.  A CT was done at that time 8 months ago.              Objective:     Past Medical History:    Acne    Amenorrhea    Anemia, postpartum ([facility])    Anemia, postpartum ([facility])    Contusion of right hand, initial encounter    COVID-19 affecting pregnancy in second trimester ([facility])    COVID and strep positive 10/19/2022 Weekly NSTs    Depression    therapy & meds not currently    Dysmenorrhea    Dysmenorrhea    Elevated BP without diagnosis of hypertension    23- sent to triage for serial Bps and PreE labs    Heart murmur    no abx needed    History of chicken pox    as a child    Left knee injury     (normal spontaneous vaginal delivery) ([facility])    Other ill-defined conditions(799.89)    PET @ 1 yr    Pregnancy ([facility])    Support: FOB and mother Pain management: Unmedicated, no water birth Vitamin K: yes Erythromycin ointment: yes Placenta: Discard  Circumcision: Yes Peds: House peds, has chosen Dr. Cash Housing/car seat: yes/yes Contraception:yes Feeding method:  Breast Pump:  Has one    Pyelonephritis during pregnancy ([facility])    E. Coli UTI x2 during pregnancy Admitted on 7/28 with pyelo Rx sent for keflex 500mg daily at bed time for suppression ABX had been discontinued d/t recurrent yeast infections    Quadriceps tendinitis    Urinary tract infection in mother during first trimester of pregnancy ([facility])    UTI 06/21 [facility] NV_______________________              Past Surgical History:   Procedure Laterality Date    Myringotomy, laser-assisted  2001    and tubes     [place] teeth removed                  Social History     Socioeconomic History    Marital status: Single     Spouse name: Mao Pringle    Number of children: 0    Years of education: 12    Highest education level: High school graduate   Occupational History    Occupation: [employer]   Tobacco Use    Smoking status: Never    Smokeless tobacco: Never   Vaping Use    Vaping status: Former   Substance and Sexual Activity    Alcohol use: Not Currently     Alcohol/week: 0.0 standard drinks of alcohol     Comment: rarely prior to pregnancy    Drug use: No    Sexual activity: Not Currently     Partners: Male   Other Topics Concern    Reaction to local anesthetic No     Service No    Caffeine Concern No    Stress Concern No    Special Diet No    Exercise Yes     Comment: walk, running, cardio    Seat Belt Yes                  Physical Exam     ED Triage Vitals [10/18/24 1442]   /77   Pulse 64   Resp 16   Temp 98.3 °F (36.8 °C)   Temp src Oral   SpO2 98 %   O2 Device None (Room air)       Current Vitals:   Vital Signs  BP: 119/79  Pulse: 60  Resp: 14  Temp: 98.3 °F (36.8 °C)  Temp src: Oral  MAP (mmHg): 91    Oxygen Therapy  SpO2: 99 %  O2 Device: None (Room air)        Physical Exam  Constitutional:       General: She is not in acute distress.     Comments: Patient was standing on the side of the bed  when I got to the room.  Looked comfortable at that time but was uncomfortable with movement such as getting into the bed as well as the examination.   HENT:      Head: Normocephalic.      Nose: Nose normal.      Mouth/Throat:      Mouth: Mucous membranes are moist.   Eyes:      Extraocular Movements: Extraocular movements intact.   Cardiovascular:      Rate and Rhythm: Normal rate.      Heart sounds: Normal heart sounds.   Pulmonary:      Effort: Pulmonary effort is normal.      Breath sounds: Normal breath sounds.   Abdominal:      General: Abdomen is flat.      Palpations: Abdomen is soft.      Comments: Patient has tenderness throughout the abdomen.  Seems to be worse in the right lower quadrant near the pelvis.  No CVA tenderness.   Musculoskeletal:         General: Normal range of motion.      Cervical back: Normal range of motion.   Skin:     General: Skin is warm.      Capillary Refill: Capillary refill takes less than 2 seconds.   Neurological:      Mental Status: She is alert.      Sensory: No sensory deficit.      Motor: No weakness.             ED Course     Labs Reviewed   HEPATIC FUNCTION PANEL (7) - Abnormal; Notable for the following components:       Result Value    ALT <7 (*)     All other components within normal limits   BASIC METABOLIC PANEL (8) - Normal   LIPASE - Normal   POCT PREGNANCY URINE - Normal   CBC WITH DIFFERENTIAL WITH PLATELET   URINALYSIS WITH CULTURE REFLEX       ED Course as of 10/18/24 1826  ------------------------------------------------------------  Time: 10/18 1610  Comment: Independently interpreted by me.  CBC CMP, lipase and UA unremarkable.  Pregnancy test negative.    ------------------------------------------------------------  Time: 10/18 1820  Comment: Ultrasound independently interpreted by me.  Patient has a complex likely hemorrhagic cyst on the left ovary but nothing on the right.  No torsion.  I just reexamined the patient discussed all the results with her.   She is much more comfortable after Toradol.  Lab workup unremarkable.  I recommended CT abdomen pelvis since we still do not know the cause of her pain.  She said she is not a fan of the CAT scan and understands I can miss dangerous diagnosis and wants to go home and agreed to just take over-the-counter ibuprofen.  She has a midwife she can follow-up with.  She voiced understanding of the instructions and my recommendations.  She is alert and oriented and decisional.  She will come back if she worsens.  ------------------------------------------------------------  Time: 10/18 1826  Comment: I messaged Maegan Gregory and she repsonded              [facility]      US PELVIS EV W DOPPLER (CPT=76856/73198/52045)    Result Date: 10/18/2024  CONCLUSION:  1. No ovarian torsion. 2. A 4.4 cm mildly complex left ovarian cyst probably containing hemorrhage.  Follow-up ultrasound in 2-3 months recommended.    Dictated by (CST): Heraclio Severino MD on 10/18/2024 at 5:39 PM     Finalized by (CST): Heraclio Severino MD on 10/18/2024 at 5:42 PM                 Medical Decision Making  Patient with recurrent pain in the right side of the abdomen radiating to the back as well as into her vagina.  Differential is vast but could include pyelonephritis, renal colic, internal hernia, bowel obstruction, ovarian torsion, ruptured ovarian cyst, PID, endometriosis, enteritis and many other possibilities.  At this time she did except only Toradol for pain.  She is agreeable to lab work and ultrasound.  I explained if we do not get her answer with the ultrasound we may need to do a CAT scan.  When she was here 8 months ago she did get a CT for this.  I reviewed the results of that.  She has never had kidney stones.  UA will be performed as well as UCG.  She declined pelvic exam said she is not at risk for PID.    Amount and/or Complexity of Data Reviewed  External Data Reviewed: labs and radiology.     Details: From earlier this year reviewed,  labs  compared  Labs: ordered. Decision-making details documented in ED Course.  Radiology: ordered and independent interpretation performed. Decision-making details documented in ED Course.    Risk  Prescription drug management.  Risk Details: The patient already  has a past medical history of Acne (10/23/2012), Amenorrhea, Anemia, postpartum (2023), Anemia, postpartum (2023), Contusion of right hand, initial encounter (2016), COVID-19 affecting pregnancy in second trimester (10/20/2022), Depression, Dysmenorrhea, Dysmenorrhea (2012), Elevated BP without diagnosis of hypertension (2022), Heart murmur, History of chicken pox, Left knee injury (2016),  (normal spontaneous vaginal delivery), Other ill-defined conditions(799.89), Pregnancy (2022), Pyelonephritis during pregnancy (2022), Quadriceps tendinitis (2017), and Urinary tract infection in mother during first trimester of pregnancy (2022). to contribute to the complexity of this ED evaluation.        Disposition and Plan     Clinical Impression:  1. Abdominal pain of unknown etiology    2. Cyst of left ovary         Disposition:  Discharge  10/18/2024  6:26 pm    Follow-up:  Nelson Campuzano MD  [address] [place] [id number]  [phone]    Follow up            Medications Prescribed:  Current Discharge Medication List              Supplementary Documentation:

## 2024-10-18 NOTE — DISCHARGE INSTRUCTIONS
Follow-up with your midwife for further evaluation.  Return for changes worsening or if you change your mind about the CAT scan.  Ibuprofen 600 mg with food every 8 hours for the next 3 days.  Read all instructions.  You do have an ovarian cyst on the left.

## 2024-10-18 NOTE — ED INITIAL ASSESSMENT (HPI)
Pt ambulatory through triage c/o RLQ abd pain, vaginal and rectal pain starting ~1330 today. Known R ovarian cyst. Denies any bleeding.

## 2024-10-18 NOTE — ED QUICK NOTES
Patient safe to be discharged home per provider. Discharge education given via printed AVS. Reviewed: follow up care with midwifery service, medications and when to seek emergency care. Patient verbalizes understanding and is able to teach back. Patient ambulated to exit.

## 2024-11-25 ENCOUNTER — OFFICE VISIT (OUTPATIENT)
Dept: DERMATOLOGY CLINIC | Facility: CLINIC | Age: 24
End: 2024-11-25

## 2024-11-25 DIAGNOSIS — L21.9 SEBORRHEIC DERMATITIS: Primary | ICD-10-CM

## 2024-11-25 PROCEDURE — 99204 OFFICE O/P NEW MOD 45 MIN: CPT | Performed by: STUDENT IN AN ORGANIZED HEALTH CARE EDUCATION/TRAINING PROGRAM

## 2024-11-25 RX ORDER — CLOBETASOL PROPIONATE 0.5 MG/ML
SOLUTION TOPICAL
Qty: 50 ML | Refills: 6 | Status: SHIPPED | OUTPATIENT
Start: 2024-11-25

## 2024-11-25 NOTE — PROGRESS NOTES
2024    New patient     Referred by:   Dr. Quintero    CHIEF COMPLAINT: Hair loss    HISTORY OF PRESENT ILLNESS: .    1. Hair loss  Location: scalp   Duration: 2 years since having baby   Signs and symptoms: increased shedding with burning and itchy   Current treatment: iron   Past treatments: prenatal         DERM HISTORY:  History of skin cancer: No  History of chronic skin disease/condition: No    FAMILY HISTORY:  History of melanoma: No  History of chronic skin disease/condition: No    History/Other:    REVIEW OF SYSTEMS:  Constitutional: Denies fever, chills, unintentional weight loss.   Skin as per HPI    PAST MEDICAL HISTORY:  Past Medical History:    Acne    Amenorrhea    Anemia, postpartum ([facility])    Anemia, postpartum ([facility])    Contusion of right hand, initial encounter    COVID-19 affecting pregnancy in second trimester ([facility])    COVID and strep positive 10/19/2022 Weekly NSTs    Depression    therapy & meds not currently    Dysmenorrhea    Dysmenorrhea    Elevated BP without diagnosis of hypertension    22- sent to triage for serial Bps and PreE labs    Heart murmur    no abx needed    History of chicken pox    as a child    Left knee injury     (normal spontaneous vaginal delivery) ([facility])    Other ill-defined conditions(799.89)    PET @ 1 yr    Pregnancy ([facility])    Support: FOB and mother Pain management: Unmedicated, no water birth Vitamin K: yes Erythromycin ointment: yes Placenta: Discard Circumcision: Yes Peds: House peds, has chosen Dr. Cash Housing/car seat: yes/yes Contraception:yes Feeding method:  Breast Pump:  Has one    Pyelonephritis during pregnancy ([facility])    E. Coli UTI x2 during pregnancy Admitted on  with pyelo Rx sent for keflex 500mg daily at bed time for suppression ABX had been discontinued d/t recurrent yeast infections    Quadriceps tendinitis    Urinary tract infection in mother during first trimester of pregnancy ([facility])    UTI  ISH NV_______________________        Medications  Current Outpatient Medications   Medication Sig Dispense Refill    fluconazole (DIFLUCAN) 150 MG Oral Tab Take 1 tablet (150 mg total) by mouth every 3 (three) days. Take one now and repeat in 72 hours (Patient not taking: Reported on 10/18/2024) 2 tablet 0    Benzocaine-Menthol (CEPACOL) 15-2.3 MG Mouth/Throat Lozenge Use as directed 1 lozenge in the mouth or throat as needed. (Patient not taking: Reported on 8/30/2024) 16 lozenge 0    traMADol 50 MG Oral Tab Take 1 tablet (50 mg total) by mouth every 6 (six) hours as needed for Pain. (Patient not taking: Reported on 8/30/2024) 20 tablet 0    Drospirenone-Ethinyl Estradiol (JYOTSNA) 3-0.02 MG Oral Tab Take 3 pills x 3 days then 2 pills x 2 days then  1 pill daily until end of package (Patient not taking: Reported on 8/30/2024) 28 tablet 0    Norethin Ace-Eth Estrad-FE (JUNEL FE 1/20) 1-20 MG-MCG Oral Tab Take 1 tablet by mouth daily. (Patient not taking: Reported on 8/30/2024) 28 tablet 11    metoclopramide (REGLAN) 10 MG Oral Tab Take 1 tablet (10 mg total) by mouth every 6 (six) hours as needed. (Patient not taking: Reported on 7/20/2023) 20 tablet 0    valACYclovir 500 MG Oral Tab Take 1 tablet (500 mg total) by mouth daily. (Patient not taking: Reported on 7/25/2023) 30 tablet 5    ferrous sulfate 325 (65 FE) MG Oral Tab EC Take 1 tablet (325 mg total) by mouth every other day. (Patient not taking: Reported on 7/20/2023) 30 tablet 5    docusate sodium 100 MG Oral Cap Take 100 mg by mouth 2 (two) times daily as needed for constipation. (Patient not taking: Reported on 7/20/2023) 30 capsule 0    Misc. Devices (BREAST PUMP) Does not apply Misc Double electric breast pump CARMINA- 1/13/23 (Patient not taking: Reported on 10/18/2024) 1 each 0    PRENAT VIT-FEPOLY-METHYLFOL-FA OR Take by mouth. (Patient not taking: Reported on 7/20/2023)         Objective:    PHYSICAL EXAM:  General: awake, alert, no acute distress  Skin: Skin exam was performed today  including the following: scalp. Pertinent findings include:   - with greasy yellow scaling and thinning at vertex    ASSESSMENT & PLAN:  Pathophysiology of diagnoses discussed with patient.  Therapeutic options reviewed. Risks, benefits, and alternatives discussed with patient. Instructions reviewed at length.    #Seborrheic dermatitis  - clobetasol 0.05% twice daily to affected areas Monday-Friday with flares in scalp   - In reserve: minoxidil for thinning and oral fluconazol for seborrheic dermatitis    Return to clinic: 3 months or sooner if something concerning arises     Jacques Reynaga MD

## 2024-11-30 ENCOUNTER — APPOINTMENT (OUTPATIENT)
Dept: GENERAL RADIOLOGY | Facility: HOSPITAL | Age: 24
End: 2024-11-30
Attending: EMERGENCY MEDICINE
Payer: OTHER MISCELLANEOUS

## 2024-11-30 ENCOUNTER — HOSPITAL ENCOUNTER (EMERGENCY)
Facility: HOSPITAL | Age: 24
Discharge: HOME | End: 2024-11-30
Attending: EMERGENCY MEDICINE
Payer: OTHER MISCELLANEOUS

## 2024-11-30 VITALS
OXYGEN SATURATION: 98 % | SYSTOLIC BLOOD PRESSURE: 121 MMHG | BODY MASS INDEX: 23.39 KG/M2 | HEIGHT: 63 IN | DIASTOLIC BLOOD PRESSURE: 78 MMHG | RESPIRATION RATE: 16 BRPM | TEMPERATURE: 97 F | WEIGHT: 132 LBS | HEART RATE: 65 BPM

## 2024-11-30 DIAGNOSIS — M54.50 ACUTE BILATERAL LOW BACK PAIN WITHOUT SCIATICA: ICD-10-CM

## 2024-11-30 DIAGNOSIS — M53.3 SACRAL PAIN: Primary | ICD-10-CM

## 2024-11-30 LAB — B-HCG UR QL: NEGATIVE

## 2024-11-30 PROCEDURE — 99284 EMERGENCY DEPT VISIT MOD MDM: CPT

## 2024-11-30 PROCEDURE — 96372 THER/PROPH/DIAG INJ SC/IM: CPT

## 2024-11-30 PROCEDURE — 72220 X-RAY EXAM SACRUM TAILBONE: CPT | Performed by: EMERGENCY MEDICINE

## 2024-11-30 PROCEDURE — 81025 URINE PREGNANCY TEST: CPT

## 2024-11-30 PROCEDURE — 72100 X-RAY EXAM L-S SPINE 2/3 VWS: CPT | Performed by: EMERGENCY MEDICINE

## 2024-11-30 RX ORDER — KETOROLAC TROMETHAMINE 30 MG/ML
30 INJECTION, SOLUTION INTRAMUSCULAR; INTRAVENOUS ONCE
Status: COMPLETED | OUTPATIENT
Start: 2024-11-30 | End: 2024-11-30

## 2024-11-30 RX ORDER — ACETAMINOPHEN 325 MG/1
650 TABLET ORAL EVERY 6 HOURS PRN
Qty: 30 TABLET | Refills: 0 | Status: SHIPPED | OUTPATIENT
Start: 2024-11-30

## 2024-11-30 RX ORDER — IBUPROFEN 600 MG/1
600 TABLET, FILM COATED ORAL EVERY 6 HOURS PRN
Qty: 28 TABLET | Refills: 0 | Status: SHIPPED | OUTPATIENT
Start: 2024-11-30 | End: 2024-12-07

## 2024-11-30 RX ORDER — LIDOCAINE 50 MG/G
1 PATCH TOPICAL EVERY 24 HOURS
Qty: 14 PATCH | Refills: 0 | Status: SHIPPED | OUTPATIENT
Start: 2024-11-30

## 2024-11-30 NOTE — DISCHARGE INSTRUCTIONS
Thank you for seeking care at [facility] Emergency Department.  You have been seen and evaluated.  Your x-rays did not show any broken bones.  Please follow-up with your primary doctor as an outpatient for reassessment to ensure you are improving.  Please take Tylenol and Motrin and use the lidocaine patches as needed for pain.   We discussed the results of your workup   Please read the instructions provided   If given prescriptions, take as instructed    Remember, your care process does not end after your visit today. Please follow-up with your doctor within 1-2 days for a follow-up check to ensure you are  improving, to see if you need any further evaluation/testing, or to evaluate for any alternate diagnoses.     Please return to the emergency department immediately if you develop chest pain, difficulty breathing, inability to drink liquids without vomiting, numbness or weakness in your legs or inability to walk, incontinence or retention of urine or stool, slurred speech, severe headache, or if you develop any other new or concerning symptoms as these could be signs of more serious medical illness.    We hope you feel better.

## 2024-11-30 NOTE — ED INITIAL ASSESSMENT (HPI)
Patient was at work, went in cooler to get fruit, cooler was wet on floor and slipped back onto buttocks/lower back and is here for pain to that region, needed assistance to get up the stairs after the event, felt better after icing it, no LOC, did not hit head, recalls whole event, no neck pain. AXOX4, GCS 15.

## 2024-11-30 NOTE — ED PROVIDER NOTES
[facility] Emergency Department Note  Patient: Deysi Wilkinson Age: 24 year old Sex: female      MRN: [id number]  : 2000    Patient Seen in: [facility] Emergency Department    History     Chief Complaint   Patient presents with    Back Pain    Fall     Stated Complaint: fall, back pain    History obtained from: patient     This is a very pleasant 24-year-old history of anemia, presents to the ER for evaluation status post fall complaining of pain to her low back and tailbone.  Patient states that she was at work when she slipped in some water near a cooler and fell directly onto her buttocks/low back.  She was helped up afterwards.  She did not hit her head or pass out.  Has since had pain in her low back and buttock area, worse with movement, improved at rest.  Feels like her legs are somewhat stiff however she is not sure if this is because she has not walked much since the fall.  She denies numbness weakness or tingling in her extremities.  She denies any other complaints, no neck or upper back pain.  She has not taken any medicines for pain prior to arrival to the ER.    Review of Systems:  Review of Systems  Positive for stated complaint: fall, back pain. Constitutional and vital signs reviewed. All other systems reviewed and negative except as noted above.    Patient History:  Past Medical History:    Acne    Amenorrhea    Anemia, postpartum ([facility])    Anemia, postpartum ([facility])    Contusion of right hand, initial encounter    COVID-19 affecting pregnancy in second trimester ([facility])    COVID and strep positive 10/19/2022 Weekly NSTs    Depression    therapy & meds not currently    Dysmenorrhea    Dysmenorrhea    Elevated BP without diagnosis of hypertension    23- sent to triage for serial Bps and PreE labs    Heart murmur    no abx needed    History of chicken pox    as a child    Left knee injury     (normal spontaneous vaginal delivery) ([facility])    Other ill-defined conditions(259.89)    PET  @ 1 yr    Pregnancy ([facility])    Support: FOB and mother Pain management: Unmedicated, no water birth Vitamin K: yes Erythromycin ointment: yes Placenta: Discard Circumcision: Yes Peds: House peds, has chosen Dr. Cash Housing/car seat: yes/yes Contraception:yes Feeding method:  Breast Pump:  Has one    Pyelonephritis during pregnancy ([facility])    E. Coli UTI x2 during pregnancy Admitted on 7/28 with pyelo Rx sent for keflex 500mg daily at bed time for suppression ABX had been discontinued d/t recurrent yeast infections    Quadriceps tendinitis    Urinary tract infection in mother during first trimester of pregnancy ([facility])    UTI 06/21 ISH NV_______________________       Past Surgical History:   Procedure Laterality Date    Myringotomy, laser-assisted  2001    and tubes     [place] teeth removed          Family History   Problem Relation Age of Onset    No Known Problems Father     No Known Problems Mother     Diabetes Maternal Aunt     Heart Attack Other         family history    Heart Disorder Other         family hhistory    No Known Problems Maternal Grandmother     No Known Problems Maternal Grandfather     Cancer Neg        Specific Social Determinants of Health:   Social History     Socioeconomic History    Marital status: Single     Spouse name: Mao Pringle    Number of children: 0    Years of education: 12    Highest education level: High school graduate   Occupational History    Occupation:    Tobacco Use    Smoking status: Never    Smokeless tobacco: Never   Vaping Use    Vaping status: Former   Substance and Sexual Activity    Alcohol use: Not Currently     Alcohol/week: 0.0 standard drinks of alcohol     Comment: rarely prior to pregnancy    Drug use: No    Sexual activity: Not Currently     Partners: Male   Other Topics Concern    Grew up on a farm No    History of tanning No    Outdoor occupation No    Pt has a pacemaker No    Pt has a defibrillator No    Breast feeding No    Reaction to local  anesthetic No     Service No    Caffeine Concern No    Stress Concern No    Special Diet No    Exercise Yes     Comment: walk, running, cardio    Seat Belt Yes           PSFH elements reviewed from today and agreed except as otherwise stated in HPI.    Physical Exam     ED Triage Vitals [11/30/24 1202]   /73   Pulse 99   Resp 16   Temp 96.8 °F (36 °C)   Temp src Temporal   SpO2 99 %   O2 Device None (Room air)       Current:/78   Pulse 65   Temp 96.8 °F (36 °C) (Temporal)   Resp 16   Ht 160 cm (5' 3\")   Wt 59.9 kg   LMP 11/08/2024 (Approximate)   SpO2 98%   BMI 23.38 kg/m²         Physical Exam  Vitals and nursing note reviewed.   Constitutional:       General: She is not in acute distress.     Appearance: She is not ill-appearing.   HENT:      Head: Normocephalic and atraumatic.      Right Ear: External ear normal.      Left Ear: External ear normal.      Nose: Nose normal.      Mouth/Throat:      Mouth: Mucous membranes are moist.   Eyes:      Conjunctiva/sclera: Conjunctivae normal.   Cardiovascular:      Rate and Rhythm: Normal rate and regular rhythm.      Heart sounds: No murmur heard.     Comments: +2 dp pulses bilaterally   Pulmonary:      Effort: Pulmonary effort is normal. No respiratory distress.      Breath sounds: No wheezing or rales.   Abdominal:      General: Abdomen is flat. There is no distension.   Musculoskeletal:         General: No deformity.      Cervical back: Neck supple. No tenderness.      Comments: There is no midline tenderness to the C/T spine, no stepoff or deformity. Mild lower lumbar midline TTP without stepoff nor deformity as well as ttp to sacrum throughout. No paraspinal muscle tenderness throughout. The pelvis is stable. There is no tenderness on palpation of bilateral anterior hip, thigh, knee, shin, calf, ankle or feet. There are no deformities of major joints noted. Grossly ROM is preserved at all major joints of bilateral upper and lower  extremities. Compartments are soft throughout the bilateral lower extremities.      Skin:     General: Skin is warm and dry.      Capillary Refill: Capillary refill takes less than 2 seconds.      Findings: No bruising.   Neurological:      General: No focal deficit present.      Mental Status: She is alert.      Comments: Strength is 5/5 bilateral hip flexion, knee flexion and extension, ankle plantar and dorsiflexion with sensation tact light touch and symmetric bilateral lower extremities throughout         ED Course   Labs:   Labs Reviewed   POCT PREGNANCY URINE - Normal     Radiology findings:    No results found.      MDM   This patient presents with tailbone and low back pain s/p mechanical fall sustained earlier today. Distally NVI on exam, with reproducible ttp to lower lumbar and diffusely to sacrum/coccyx without bruising or ecchymosis.     Differential diagnoses considered includes, but is not limited to:   Fracture of sacrum/coccyx/L spine  Msk strain or sprain     Will obtain the following tests: XR sacrum/coccyx/L spine, urine preg   Will administer the following medications/therapies: toradol 30 mg IM, lido patch     Please see ED course for my independent review of these tests/imaging results.      ED Course as of 11/30/24 1949  ------------------------------------------------------------  Time: 11/30 1341  Value: XR LUMBAR SPINE (MIN 2 VIEWS) (CPT=72100)  Comment:   Findings and impression:  Normal alignment with no fracture     ------------------------------------------------------------  Time: 11/30 1342  Value: XR SACRUM + COCCYX (MIN 2 VIEWS) (CPT=72220)  Comment: Findings and impression:  Normal alignment with no fracture    Reassessed updated with results.  All questions answered.  Counseled her on supportive cares including icing, lidocaine patches, ibuprofen and return if new or worsening symptoms.  She is comfortable with the discharge plan             Procedures:  Procedures        Disposition and Plan     Clinical Impression:  1. Sacral pain    2. Acute bilateral low back pain without sciatica        Disposition:  Discharge    Follow-up:  Adrian Quintero MD  [address] [facility] [address] [phone]    Schedule an appointment as soon as possible for a visit in 2 day(s)      [facility] Emergency Department  [address]  [facility] [id number] [phone]  Go to  If symptoms worsen, immediately      Medications Prescribed:  Discharge Medication List as of 11/30/2024  2:16 PM        START taking these medications    Details   ibuprofen 600 MG Oral Tab Take 1 tablet (600 mg total) by mouth every 6 (six) hours as needed., Normal, Disp-28 tablet, R-0      lidocaine 5 % External Patch Place 1 patch onto the skin daily., Normal, Disp-14 patch, R-0      acetaminophen (TYLENOL) 325 MG Oral Tab Take 2 tablets (650 mg total) by mouth every 6 (six) hours as needed., Normal, Disp-30 tablet, R-0               This note may have been created using voice dictation technology and may include inadvertent errors.      Ella Alfredo, DO  Attending Physician   Emergency Medicine

## 2024-12-02 ENCOUNTER — HOSPITAL ENCOUNTER (OUTPATIENT)
Age: 24
Discharge: HOME | End: 2024-12-02
Payer: OTHER MISCELLANEOUS

## 2024-12-02 VITALS
RESPIRATION RATE: 19 BRPM | DIASTOLIC BLOOD PRESSURE: 83 MMHG | OXYGEN SATURATION: 100 % | TEMPERATURE: 98 F | HEART RATE: 64 BPM | SYSTOLIC BLOOD PRESSURE: 133 MMHG

## 2024-12-02 DIAGNOSIS — M54.16 ACUTE RADICULAR LOW BACK PAIN: ICD-10-CM

## 2024-12-02 DIAGNOSIS — G89.11 ACUTE LOW BACK PAIN DUE TO TRAUMA: ICD-10-CM
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
## 2024-12-02 DIAGNOSIS — M54.50 ACUTE LOW BACK PAIN DUE TO TRAUMA: ICD-10-CM

## 2024-12-02 DIAGNOSIS — S20.221A CONTUSION OF RIGHT SIDE OF BACK, INITIAL ENCOUNTER: Primary | ICD-10-CM
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
## 2024-12-02 PROCEDURE — 99213 OFFICE O/P EST LOW 20 MIN: CPT | Performed by: PHYSICIAN ASSISTANT

## 2024-12-02 RX ORDER — METHYLPREDNISOLONE 4 MG/1
TABLET ORAL
Qty: 1 EACH | Refills: 0 | Status: SHIPPED | OUTPATIENT
Start: 2024-12-02

## 2024-12-02 NOTE — ED PROVIDER NOTES
Patient Seen in: [facility]      History     Chief Complaint   Patient presents with    Fall     Stated Complaint: Fall    Subjective:   HPI      Patient is a 24-year-old female, presenting to immediate care for evaluation of worsening tailbone and right low back pain with radicular pain down right leg status post mechanical fall from slipping on \"chicken juice\". Onset: School.  Work-related injury.  Patiently seen in ER.  Review of chart patient had x-ray of lumbar spine and x-ray of sacrum/coccyx with no evidence of fracture.  Normal spinal alignment.  No osseous abnormality.  Was prescribed Motrin and Tylenol and lidocaine patch for pain.  Has been alternating with minimal improvement of symptoms.  Notes associated swelling and bruising to the area with increased pain.  Symptoms worse with movement and going from sitting to standing position.  Denies any bladder/bowel dysfunction.  No saddle esthesia.  Ambulate with steady low gait.  Coming to immediate care for further evaluation and extension.  Unable to work due to current injury.  Has not followed up with occupational health.  Unable to see primary doctor.       Objective:     Past Medical History:    Acne    Amenorrhea    Anemia, postpartum ([facility])    Anemia, postpartum ([facility])    Contusion of right hand, initial encounter    COVID-19 affecting pregnancy in second trimester ([facility])    COVID and strep positive 10/19/2022 Weekly NSTs    Depression    therapy & meds not currently    Dysmenorrhea    Dysmenorrhea    Elevated BP without diagnosis of hypertension    23- sent to triage for serial Bps and PreE labs    Heart murmur    no abx needed    History of chicken pox    as a child    Left knee injury     (normal spontaneous vaginal delivery) ([facility])    Other ill-defined conditions(799.89)    PET @ 1 yr    Pregnancy ([facility])    Support: FOB and mother Pain management: Unmedicated, no water birth Vitamin K: yes Erythromycin ointment: yes Placenta:  Discard Circumcision: Yes Peds: House peds, has chosen Dr. Cash Housing/car seat: yes/yes Contraception:yes Feeding method:  Breast Pump:  Has one    Pyelonephritis during pregnancy ([facility])    E. Coli UTI x2 during pregnancy Admitted on 7/28 with pyelo Rx sent for keflex 500mg daily at bed time for suppression ABX had been discontinued d/t recurrent yeast infections    Quadriceps tendinitis    Urinary tract infection in mother during first trimester of pregnancy ([facility])    UTI 06/21 ISH NV_______________________              Past Surgical History:   Procedure Laterality Date    Myringotomy, laser-assisted  2001    and tubes     [place] teeth removed                  Social History     Socioeconomic History    Marital status: Single     Spouse name: Mao Pringle    Number of children: 0    Years of education: 12    Highest education level: High school graduate   Occupational History    Occupation: [employer]   Tobacco Use    Smoking status: Never    Smokeless tobacco: Never   Vaping Use    Vaping status: Former   Substance and Sexual Activity    Alcohol use: Not Currently     Alcohol/week: 0.0 standard drinks of alcohol     Comment: rarely prior to pregnancy    Drug use: No    Sexual activity: Not Currently     Partners: Male   Other Topics Concern    Grew up on a farm No    History of tanning No    Outdoor occupation No    Pt has a pacemaker No    Pt has a defibrillator No    Breast feeding No    Reaction to local anesthetic No     Service No    Caffeine Concern No    Stress Concern No    Special Diet No    Exercise Yes     Comment: walk, running, cardio    Seat Belt Yes              Review of Systems   Constitutional:  Positive for activity change.   Gastrointestinal:  Negative for abdominal pain, nausea and vomiting.   Genitourinary:  Negative for difficulty urinating, flank pain and hematuria.   Musculoskeletal:  Positive for back pain.        Low back pain   Skin:  Negative for rash.   Allergic/Immunologic:  Negative for immunocompromised state.   Neurological:  Negative for weakness and numbness.   Psychiatric/Behavioral:  Negative for confusion.        Positive for stated complaint: Fall  Other systems are as noted in HPI.  Constitutional and vital signs reviewed.      All other systems reviewed and negative except as noted above.    Physical Exam     ED Triage Vitals [12/02/24 0850]   /83   Pulse 64   Resp 19   Temp 98.3 °F (36.8 °C)   Temp src Temporal   SpO2 100 %   O2 Device None (Room air)       Current Vitals:   Vital Signs  BP: 133/83  Pulse: 64  Resp: 19  Temp: 98.3 °F (36.8 °C)  Temp src: Temporal    Oxygen Therapy  SpO2: 100 %  O2 Device: None (Room air)        Physical Exam  Vitals and nursing note reviewed.   Constitutional:       Appearance: Normal appearance. She is not ill-appearing, toxic-appearing or diaphoretic.   HENT:      Head: Normocephalic and atraumatic.      Mouth/Throat:      Mouth: Mucous membranes are moist.   Eyes:      Conjunctiva/sclera: Conjunctivae normal.   Cardiovascular:      Rate and Rhythm: Normal rate.      Pulses: Normal pulses.   Pulmonary:      Effort: Pulmonary effort is normal. No respiratory distress.   Abdominal:      General: There is no distension.      Palpations: Abdomen is soft.      Tenderness: There is no abdominal tenderness. There is no guarding.   Musculoskeletal:         General: Swelling and tenderness present. Normal range of motion.      Cervical back: Normal range of motion. No rigidity.      Comments: Tenderness to palpation bilateral low back with mild swelling and ecchymosis without hematoma right low back.  No midline spinal tenderness.   Skin:     Findings: Bruising present.   Neurological:      General: No focal deficit present.      Mental Status: She is alert and oriented to person, place, and time.      Motor: No weakness.      Gait: Gait normal.   Psychiatric:         Mood and Affect: Mood normal.         Behavior: Behavior normal.            ED Course   Labs Reviewed - No data to display       MDM     Patient is a 24-year-old female, presenting to immediate care for evaluation of worsening low back pain status post mechanical fall 3 days ago.  Work-related injury.  Seen in an outside ER for initial evaluation and had x-ray imaging lumbar spine and sacrum/coccyx negative for acute fracture or osseous abnormality.  Diagnosed with probable low back sprain.  Treated supportively.  Motrin, Tylenol, lidocaine patch for pain.  Patient here for further evaluation.  Unable to perform work duties given current injury.  Patient is tender to palpation bilateral low back with contusion right low back without hematoma.  She is neurovasc intact and compartments soft.  Does appear uncomfortable secondary to pain.  Declining IM medication or additional pain medication for acute back pain.  Discussed outpatient management.  Supportive care.  Continue Motrin, Tylenol, lidocaine patch for pain.  Will prescribe Medrol Dosepak for radicular low back pain.  Patient instructed to follow-up with occupational health for work-related injury.      Medical Decision Making      Disposition and Plan     Clinical Impression:  1. Contusion of right side of back, initial encounter    2. Acute low back pain due to trauma    3. Fall, sequela    4. Work related injury    5. Acute radicular low back pain         Disposition:  Discharge  12/2/2024  9:22 am    Follow-up:  [facility]  [address] [place] [id number] [phone]  Schedule an appointment as soon as possible for a visit       Adrian Quintero MD  [address] [place] [address] [phone]                Medications Prescribed:  Discharge Medication List as of 12/2/2024  9:21 AM        START taking these medications    Details   methylPREDNISolone (MEDROL) 4 MG Oral Tablet Therapy Pack Dosepack: take as directed, Normal, Disp-1 each, R-0                 Supplementary  Documentation:

## 2024-12-02 NOTE — ED INITIAL ASSESSMENT (HPI)
Pt presents with fall 3 days ago at work. Pt reports slipping and falling injuring back and tail bone, pain \"shooting down right leg from buttocks\", peer pt.     Pt alternating Motrin and Tylenol for pain.

## 2024-12-05 ENCOUNTER — APPOINTMENT (OUTPATIENT)
Dept: OTHER | Facility: HOSPITAL | Age: 24
End: 2024-12-05
Attending: EMERGENCY MEDICINE

## 2024-12-12 ENCOUNTER — APPOINTMENT (OUTPATIENT)
Dept: OTHER | Facility: HOSPITAL | Age: 24
End: 2024-12-12
Attending: EMERGENCY MEDICINE

## 2024-12-26 ENCOUNTER — APPOINTMENT (OUTPATIENT)
Dept: OTHER | Facility: HOSPITAL | Age: 24
End: 2024-12-26
Attending: EMERGENCY MEDICINE

## 2025-01-01 ENCOUNTER — PATIENT MESSAGE (OUTPATIENT)
Dept: DERMATOLOGY CLINIC | Facility: CLINIC | Age: 25
End: 2025-01-01

## 2025-01-02 RX ORDER — MINOXIDIL 2.5 MG/1
1.25 TABLET ORAL DAILY
Qty: 45 TABLET | Refills: 1 | Status: SHIPPED | OUTPATIENT
Start: 2025-01-02

## 2025-02-02 ENCOUNTER — PATIENT MESSAGE (OUTPATIENT)
Dept: FAMILY MEDICINE CLINIC | Facility: CLINIC | Age: 25
End: 2025-02-02

## 2025-02-03 NOTE — TELEPHONE ENCOUNTER
Last visit (telemedicine): 12/2023    Last in person visit:  5/30/23    No future appointments.    Advised appointment.

## 2025-02-04 NOTE — TELEPHONE ENCOUNTER
Restarting Ritalin  Message [phone]  From  Marielos Lizarraga RN To  Deysi Wilkinson Sent and Delivered  2/3/2025 10:51 AM   Last Read in MyChart  2/3/2025 10:59 AM by Deysi Wilkinson

## 2025-02-16 ENCOUNTER — HOSPITAL ENCOUNTER (EMERGENCY)
Facility: HOSPITAL | Age: 25
Discharge: HOME | End: 2025-02-16
Attending: EMERGENCY MEDICINE
Payer: MEDICAID

## 2025-02-16 VITALS
WEIGHT: 135 LBS | RESPIRATION RATE: 17 BRPM | SYSTOLIC BLOOD PRESSURE: 120 MMHG | HEART RATE: 63 BPM | TEMPERATURE: 100 F | DIASTOLIC BLOOD PRESSURE: 75 MMHG | BODY MASS INDEX: 24.84 KG/M2 | HEIGHT: 62 IN | OXYGEN SATURATION: 97 %

## 2025-02-16 DIAGNOSIS — R09.81 NASAL CONGESTION: ICD-10-CM

## 2025-02-16 DIAGNOSIS — R52 BODY ACHES: ICD-10-CM

## 2025-02-16 DIAGNOSIS — J10.1 INFLUENZA A: Primary | ICD-10-CM

## 2025-02-16 LAB
ANION GAP SERPL CALC-SCNC: 8 MMOL/L (ref 0–18)
BUN BLD-MCNC: 8 MG/DL (ref 9–23)
BUN/CREAT SERPL: 9.6 (ref 10–20)
CALCIUM BLD-MCNC: 8.6 MG/DL (ref 8.7–10.4)
CHLORIDE SERPL-SCNC: 104 MMOL/L (ref 98–112)
CK SERPL-CCNC: 229 U/L
CO2 SERPL-SCNC: 25 MMOL/L (ref 21–32)
CREAT BLD-MCNC: 0.83 MG/DL
EGFRCR SERPLBLD CKD-EPI 2021: 102 ML/MIN/1.73M2 (ref 60–?)
FLUAV + FLUBV RNA SPEC NAA+PROBE: NEGATIVE
FLUAV + FLUBV RNA SPEC NAA+PROBE: POSITIVE
GLUCOSE BLD-MCNC: 102 MG/DL (ref 70–99)
HCG SERPL QL: NEGATIVE
OSMOLALITY SERPL CALC.SUM OF ELEC: 283 MOSM/KG (ref 275–295)
POTASSIUM SERPL-SCNC: 3.9 MMOL/L (ref 3.5–5.1)
RSV RNA SPEC NAA+PROBE: NEGATIVE
SARS-COV-2 RNA RESP QL NAA+PROBE: NOT DETECTED
SODIUM SERPL-SCNC: 137 MMOL/L (ref 136–145)

## 2025-02-16 PROCEDURE — 99284 EMERGENCY DEPT VISIT MOD MDM: CPT

## 2025-02-16 PROCEDURE — 84703 CHORIONIC GONADOTROPIN ASSAY: CPT | Performed by: EMERGENCY MEDICINE

## 2025-02-16 PROCEDURE — 0241U SARS-COV-2/FLU A AND B/RSV BY PCR (GENEXPERT): CPT | Performed by: EMERGENCY MEDICINE

## 2025-02-16 PROCEDURE — 96374 THER/PROPH/DIAG INJ IV PUSH: CPT

## 2025-02-16 PROCEDURE — 80048 BASIC METABOLIC PNL TOTAL CA: CPT | Performed by: EMERGENCY MEDICINE

## 2025-02-16 PROCEDURE — 82550 ASSAY OF CK (CPK): CPT | Performed by: EMERGENCY MEDICINE

## 2025-02-16 PROCEDURE — 96361 HYDRATE IV INFUSION ADD-ON: CPT

## 2025-02-16 PROCEDURE — 0241U SARS-COV-2/FLU A AND B/RSV BY PCR (GENEXPERT): CPT

## 2025-02-16 RX ORDER — FLUTICASONE PROPIONATE 50 MCG
2 SPRAY, SUSPENSION (ML) NASAL DAILY PRN
Qty: 16 G | Refills: 0 | Status: SHIPPED | OUTPATIENT
Start: 2025-02-16 | End: 2025-03-18

## 2025-02-16 RX ORDER — ACETAMINOPHEN 500 MG
1000 TABLET ORAL ONCE
Status: COMPLETED | OUTPATIENT
Start: 2025-02-16 | End: 2025-02-16

## 2025-02-16 RX ORDER — CETIRIZINE HYDROCHLORIDE 10 MG/1
10 TABLET ORAL DAILY PRN
Qty: 30 TABLET | Refills: 0 | Status: SHIPPED | OUTPATIENT
Start: 2025-02-16 | End: 2025-03-18

## 2025-02-16 RX ORDER — KETOROLAC TROMETHAMINE 15 MG/ML
15 INJECTION, SOLUTION INTRAMUSCULAR; INTRAVENOUS ONCE
Status: COMPLETED | OUTPATIENT
Start: 2025-02-16 | End: 2025-02-16

## 2025-02-17 ENCOUNTER — TELEPHONE (OUTPATIENT)
Dept: FAMILY MEDICINE CLINIC | Facility: CLINIC | Age: 25
End: 2025-02-17

## 2025-02-17 NOTE — ED PROVIDER NOTES
Patient Seen in: [facility] Emergency Department    History     Chief Complaint   Patient presents with    Fever     Stated Complaint: fever    HPI    Patient is a 24-year-old female history of dysmenorrhea, presents to the ER due to fever, fatigue, body aches, nasal congestion and intermittent tinnitus and right-sided jaw and facial pain.  Symptoms all onset about 4 days ago.  Positive sick contact at home and patient's child who has had flulike symptoms.  Patient denies sore throat, no significant cough.  No chest pain, shortness of breath, abdominal pain, vomiting or diarrhea but states has had poor appetite and has not kept herself hydrated.  No recent travel.          Past Medical History:    Acne    Amenorrhea    Anemia, postpartum ([facility])    Anemia, postpartum ([facility])    Contusion of right hand, initial encounter    COVID-19 affecting pregnancy in second trimester ([facility])    COVID and strep positive 10/19/2022 Weekly NSTs    Depression    therapy & meds not currently    Dysmenorrhea    Dysmenorrhea    Elevated BP without diagnosis of hypertension    23- sent to triage for serial Bps and PreE labs    Heart murmur    no abx needed    History of chicken pox    as a child    Left knee injury     (normal spontaneous vaginal delivery) ([facility])    Other ill-defined conditions(799.89)    PET @ 1 yr    Pregnancy ([facility])    Support: FOB and mother Pain management: Unmedicated, no water birth Vitamin K: yes Erythromycin ointment: yes Placenta: Discard Circumcision: Yes Peds: House peds, has chosen Dr. Cash Housing/car seat: yes/yes Contraception:yes Feeding method:  Breast Pump:  Has one    Pyelonephritis during pregnancy ([facility])    E. Coli UTI x2 during pregnancy Admitted on  with pyelo Rx sent for keflex 500mg daily at bed time for suppression ABX had been discontinued d/t recurrent yeast infections    Quadriceps tendinitis    Urinary tract infection in mother during first trimester of pregnancy ([facility])     UTI 06/21 [facility]_______________________       Past Surgical History:   Procedure Laterality Date    Myringotomy, laser-assisted  2001    and tubes     Joanna teeth removed              Family History   Problem Relation Age of Onset    No Known Problems Father     No Known Problems Mother     Diabetes Maternal Aunt     Heart Attack Other         family history    Heart Disorder Other         family hhistory    No Known Problems Maternal Grandmother     No Known Problems Maternal Grandfather     Cancer Neg        Social History     Socioeconomic History    Marital status: Single     Spouse name: Mao Pringle    Number of children: 0    Years of education: 12    Highest education level: High school graduate   Occupational History    Occupation: [employer]   Tobacco Use    Smoking status: Never    Smokeless tobacco: Never   Vaping Use    Vaping status: Former   Substance and Sexual Activity    Alcohol use: Not Currently     Alcohol/week: 0.0 standard drinks of alcohol     Comment: rarely prior to pregnancy    Drug use: No    Sexual activity: Not Currently     Partners: Male   Other Topics Concern    Grew up on a farm No    History of tanning No    Outdoor occupation No    Pt has a pacemaker No    Pt has a defibrillator No    Breast feeding No    Reaction to local anesthetic No     Service No    Caffeine Concern No    Stress Concern No    Special Diet No    Exercise Yes     Comment: walk, running, cardio    Seat Belt Yes       Review of Systems    Positive for stated complaint: fever  Other systems are as noted in HPI.  Constitutional and vital signs reviewed.      All other systems reviewed and negative except as noted above.    PSFH elements reviewed from today and agreed except as otherwise stated in HPI.    Physical Exam     ED Triage Vitals [02/16/25 2056]   /81   Pulse 85   Resp 18   Temp 100.1 °F (37.8 °C)   Temp src Oral   SpO2 96 %   O2 Device None (Room air)       Current:/75   Pulse 63   Temp  100.1 °F (37.8 °C) (Oral)   Resp 17   Ht 157.5 cm (5' 2\")   Wt 61.2 kg   LMP 01/15/2025 (Approximate)   SpO2 97%   BMI 24.69 kg/m²   PULSE OX 97% on RA   GENERAL: appears tired, non toxic  HEAD: normocephalic, atraumatic.  No tenderness over the maxillary sinuses.    EYES: sclera non icteric bilateral, conjunctiva injected bilateral  EARS:tm's clear bilaterally. No mastoid erythema bilaterally nor swelling.   NOSE: nasal turbinates boggy bilaterally.   NECK: supple, no meningeal signs, no adenopathy, no thyromegaly  THROAT: pnd noted, post phaynx injected, no tonsillar enlargement. No trismus.  No jaw or neck swelling.  No malocclusion.  No cracked or loose teeth.  No periapical abscess  LUNGS: no accessory use, lungs clear without wheezes or crackle  CARDIO: RRR without murmur, 2+ radial and dp pulse bilaterally   EXTREMITIES: no cyanosis, clubbing or edema  GI: soft, non-tender, normal bowel sounds  SKIN: good skin turgor, no obvious rashes        ED Course     Labs Reviewed   BASIC METABOLIC PANEL (8) - Abnormal; Notable for the following components:       Result Value    Glucose 102 (*)     BUN 8 (*)     BUN/CREA Ratio 9.6 (*)     Calcium, Total 8.6 (*)     All other components within normal limits   CK CREATINE KINASE (NOT CREATININE) - Abnormal; Notable for the following components:     (*)     All other components within normal limits   SARS-COV-2/FLU A AND B/RSV BY PCR (GENEXPERT) - Abnormal; Notable for the following components:    Influenza A by PCR Positive (*)     All other components within normal limits    Narrative:     This test is intended for the qualitative detection and differentiation of SARS-CoV-2, influenza A, influenza B, and respiratory syncytial virus (RSV) viral RNA in nasopharyngeal or nares swabs from individuals suspected of respiratory viral infection consistent with COVID-19 by their healthcare provider. Signs and symptoms of respiratory viral infection due to SARS-CoV-2,  influenza, and RSV can be similar.    Test performed using the Xpert Xpress SARS-CoV-2/FLU/RSV (real time RT-PCR)  assay on the GeneXpert instrument, [employer], [place] [address].   This test is being used under the Food and Drug Administration's Emergency Use Authorization.    The authorized Fact Sheet for Healthcare Providers for this assay is available upon request from the laboratory.   HCG, BETA SUBUNIT, QUAL - Normal       MDM     24-year-old female here with 4 days of fever, body aches, decreased appetite with positive sick contact at home with similar symptoms.  Borderline febrile in triage but otherwise normal vital signs satting well on room air.  Exam as above with unremarkable cardiopulmonary exam, intact pulses.  During our assessment patient became slightly tearful and states it has been stressful having a sick 2 year old at home but otherwise feels well supported at home and mentally feeling ok she just wants to care for herself at this time.   Differential to include: influenza vs. Other viral URI vs. rhinonsinusitis vs electrlyte abnormality vs rhabdomyolysis vs dehydration. No cough or abnormal lung sounds to suggest concurrent pneumonia.     Plan: upreg, bmp, cpk, viral swab, IV NS bolus, toradol, tylenol and will reevaluate     ED Course as of 02/17/25 0058  ------------------------------------------------------------  Time: 02/16 2202  Value: INFLUENZA A BY PCR(!): Positive  Comment: (Reviewed)  ------------------------------------------------------------  Time: 02/16 2300  Comment: Patient reassessed updated with results.  Labs are not consistent with rhabdo, ANA ROSA or other etiology.  Glucose is normal.  She is feeling better after medicines here.  Advised supportive cares at home, close follow-up with her primary doctor return if new or worsening symptoms occur immediately.  She is comfortable with the discharge plan at this time.             Disposition and Plan     Clinical Impression:  1.  Influenza A    2. Body aches    3. Nasal congestion        Disposition:  Discharge    Follow-up:  Adrian Quintero MD  [address] [address] [phone]    Schedule an appointment as soon as possible for a visit in 2 day(s)      [facility] Emergency Department  [address] [facility] [id number] [phone]  Go to  If symptoms worsen, immediately      Medications Prescribed:  Discharge Medication List as of 2/16/2025 11:17 PM        START taking these medications    Details   cetirizine 10 MG Oral Tab Take 1 tablet (10 mg total) by mouth daily as needed for Allergies or Rhinitis., Normal, Disp-30 tablet, R-0      fluticasone propionate 50 MCG/ACT Nasal Suspension 2 sprays by Nasal route daily as needed for Rhinitis or Allergies., Normal, Disp-16 g, R-0                 Ella Alfredo,   Attending Physician  Emergency Medicine

## 2025-02-17 NOTE — TELEPHONE ENCOUNTER
Message noted and letter generated as requested. Sent to patient via [employer]. Pt notified.

## 2025-02-17 NOTE — DISCHARGE INSTRUCTIONS
Thank you for seeking care at [facility] Emergency Department.  You have been seen and evaluated for viral symptoms. Your influenza test came back positive.    We discussed the results of your workup   Please read the instructions provided   If given prescriptions, take as instructed    Today you were seen for symptoms related to influenza - a contagious viral infection. Antibiotics will not help these infections.  Please treat fevers with Tylenol or Motrin and remember to keep yourself hydrated as this is the most important thing to help you feel better.      Remember, your care process does not end after your visit today. Please follow-up with your doctor within 1-2 days for a follow-up check to ensure you are  improving, to see if you need any further evaluation/testing, or to evaluate for any alternate diagnoses.     Please return to the emergency department right away if you develop any new or worsening symptoms such as dehydration, persistent fevers, difficulty with breathing,  or if you develop any other new or concerning symptoms as these could be signs of more serious medical illness.    We hope you feel better.

## 2025-02-17 NOTE — ED INITIAL ASSESSMENT (HPI)
Pt presents to ed with c/o fever x 4 days. Pt reports cough, headache, ringing in her ears, and right sided jaw pain.     Excedrin at 9am, Motrin at 630pm

## 2025-02-17 NOTE — TELEPHONE ENCOUNTER
Patient calling in stating she was seen last night in the ER and was diagnosed with Influenza A. She's contagious and is unable to attend school or work, and is requesting a note from her PCP to excuse her.     Please place the note in her MC, and if any questions please call her.

## 2025-02-18 ENCOUNTER — NURSE TRIAGE (OUTPATIENT)
Dept: FAMILY MEDICINE CLINIC | Facility: CLINIC | Age: 25
End: 2025-02-18

## 2025-02-18 NOTE — TELEPHONE ENCOUNTER
Action Requested: Summary for Provider     []  Critical Lab, Recommendations Needed  [] Need Additional Advice  [x]   FYI    []   Need Orders  [] Need Medications Sent to Pharmacy  []  Other     SUMMARY: + Influenza. Now new onset of symptoms: pain of her eyes, nose, and jaw bilaterally--> they is tender to touch. Some swelling is present of left eye. Pain from right buttocks down leg to knee. Right sided headache-->6/10 and constant. Advised Immediate Care or Emergency Department now--> agreeable to [facility]. [See below for more details]    Reason for call: Influenza/facial swelling and pain/leg pain/headache  Onset: new symptoms yesterday    Reason for Disposition   Patient sounds very sick or weak to the triager    Protocols used: Influenza (Flu) Follow-Up Call-A-[place]      Patient called states she was seen in Emergency Department and diagnosed with influenza. She states she has new onset of pain of her eyes, nose, and jaw bilaterally--> they is tender to touch. Some swelling is present of left eye--> she has applied cold compress on site and swelling is decreasing; she is able to raise eyebrows and stick out tongue. Pain is also present of right leg that starts from right buttocks that radiates down to her knee. Denies shortness of breath, chest pain, and/or chest tightness, one-sided weakness, fever, ear pain, slurred speech, lightheadedness or dizziness. Fever broke at 3 am. She has a headache that is on her right side and constant--> 6/10. Denies any neck stiffness. She sounds as if she is crying. Refer to system/assessment yes/no answers. Advised Immediate Care or Emergency Department--> agreeable to [facility] now, she will have her mother take her. Patient instructed any new or worsening symptoms [reviewed] seek immediate medical attention--> 911. Patient verbalized understanding. No further questions or concerns at this time.

## 2025-04-29 ENCOUNTER — OFFICE VISIT (OUTPATIENT)
Dept: OBGYN CLINIC | Facility: CLINIC | Age: 25
End: 2025-04-29
Payer: MEDICAID

## 2025-04-29 VITALS
BODY MASS INDEX: 23.3 KG/M2 | HEART RATE: 66 BPM | DIASTOLIC BLOOD PRESSURE: 88 MMHG | WEIGHT: 126.63 LBS | HEIGHT: 62 IN | SYSTOLIC BLOOD PRESSURE: 131 MMHG

## 2025-04-29 DIAGNOSIS — Z30.09 GENERAL COUNSELING FOR PRESCRIPTION OF ORAL CONTRACEPTIVES: ICD-10-CM

## 2025-04-29 DIAGNOSIS — Z11.3 SCREENING FOR STD (SEXUALLY TRANSMITTED DISEASE): ICD-10-CM

## 2025-04-29 DIAGNOSIS — Z30.09 GENERAL COUNSELING AND ADVICE ON CONTRACEPTIVE MANAGEMENT: Primary | ICD-10-CM

## 2025-04-29 PROCEDURE — 99213 OFFICE O/P EST LOW 20 MIN: CPT | Performed by: ADVANCED PRACTICE MIDWIFE

## 2025-04-29 PROCEDURE — 81025 URINE PREGNANCY TEST: CPT | Performed by: ADVANCED PRACTICE MIDWIFE

## 2025-04-29 RX ORDER — DROSPIRENONE AND ETHINYL ESTRADIOL 0.02-3(28)
1 KIT ORAL DAILY
Qty: 28 TABLET | Refills: 11 | Status: SHIPPED | OUTPATIENT
Start: 2025-04-29 | End: 2026-04-29

## 2025-04-29 NOTE — PROGRESS NOTES
Subjective:   Patient ID: Deysi Wilkinson is a 25 year old female.who presents for contraception.  Sexually active one male partner.  Has been using withdrawal. LMP 3/30/25 normal.    HPI    History/Other:   Review of Systems   Constitutional: Negative.    Respiratory: Negative.     Cardiovascular: Negative.    Psychiatric/Behavioral: Negative.       Current Medications[1]  Allergies:Allergies[2]    Objective:   Physical Exam  Vitals reviewed.   Constitutional:       General: She is not in acute distress.     Appearance: Normal appearance. She is normal weight. She is not ill-appearing or toxic-appearing.   Cardiovascular:      Rate and Rhythm: Normal rate.   Pulmonary:      Effort: Pulmonary effort is normal.   Neurological:      Mental Status: She is alert and oriented to person, place, and time.     Pelvic deferred    Assessment & Plan:   1. General counseling and advice on contraceptive management    2. General counseling for prescription of oral contraceptives    Reviewed methods of contraception including barrier, pills, rings, patch, IUD and Nexplanon.  Reviewed risks and benefits of each method.  Reviewed risk of pregnancy without contraceptive use.  Reviewed STI transmission./ prevention  Pap schedule reviewed Pap due 5/2025  Pt desires COCP  Risks of OCP's especially DVT, elevated blood pressure, and failure resulting in pregnancy were reviewed.  Increased risk for heart attack and stroke especially with tobacco use was also discussed.    Potential side effects and non-contraceptive benefits were reviewed.      Total time spent 20 minutes this calendar day which includes preparing to see the patient including chart review, obtaining and/or reviewing additional medical history, performing a physical exam and evaluation, documenting clinical information in the electronic medical record, independently interpreting results, counseling the patient, communicating results to the patient/family/caregiver and  coordinating care.     No orders of the defined types were placed in this encounter.      Meds This Visit:  Requested Prescriptions     Signed Prescriptions Disp Refills    Drospirenone-Ethinyl Estradiol (JYOTSNA) 3-0.02 MG Oral Tab 28 tablet 11     Sig: Take 1 tablet by mouth daily.       Imaging & Referrals:  None         [1]   Current Outpatient Medications   Medication Sig Dispense Refill    Drospirenone-Ethinyl Estradiol (JYOTSNA) 3-0.02 MG Oral Tab Take 1 tablet by mouth daily. 28 tablet 11    minoxidil 2.5 MG Oral Tab Take 0.5 tablets (1.25 mg total) by mouth daily. 45 tablet 1    methylPREDNISolone (MEDROL) 4 MG Oral Tablet Therapy Pack Dosepack: take as directed 1 each 0    lidocaine 5 % External Patch Place 1 patch onto the skin daily. 14 patch 0    acetaminophen (TYLENOL) 325 MG Oral Tab Take 2 tablets (650 mg total) by mouth every 6 (six) hours as needed. 30 tablet 0    clobetasol 0.05 % External Solution Use twice daily Monday-Friday with flares on scalp. Do not use on face. 50 mL 6    fluconazole (DIFLUCAN) 150 MG Oral Tab Take 1 tablet (150 mg total) by mouth every 3 (three) days. Take one now and repeat in 72 hours (Patient not taking: Reported on 11/25/2024) 2 tablet 0    Benzocaine-Menthol (CEPACOL) 15-2.3 MG Mouth/Throat Lozenge Use as directed 1 lozenge in the mouth or throat as needed. (Patient not taking: Reported on 11/25/2024) 16 lozenge 0    traMADol 50 MG Oral Tab Take 1 tablet (50 mg total) by mouth every 6 (six) hours as needed for Pain. (Patient not taking: Reported on 11/25/2024) 20 tablet 0    metoclopramide (REGLAN) 10 MG Oral Tab Take 1 tablet (10 mg total) by mouth every 6 (six) hours as needed. (Patient not taking: Reported on 11/25/2024) 20 tablet 0    valACYclovir 500 MG Oral Tab Take 1 tablet (500 mg total) by mouth daily. (Patient not taking: Reported on 11/25/2024) 30 tablet 5    ferrous sulfate 325 (65 FE) MG Oral Tab EC Take 1 tablet (325 mg total) by mouth every other day. 30  tablet 5    docusate sodium 100 MG Oral Cap Take 100 mg by mouth 2 (two) times daily as needed for constipation. (Patient not taking: Reported on 11/25/2024) 30 capsule 0    Misc. Devices (BREAST PUMP) Does not apply Misc Double electric breast pump CARMINA- 1/13/23 (Patient not taking: Reported on 11/25/2024) 1 each 0    PRENAT VIT-FEPOLY-METHYLFOL-FA OR Take by mouth. (Patient not taking: Reported on 11/25/2024)     [2]   Allergies  Allergen Reactions    Seafood ANAPHYLAXIS    Strawberries ANAPHYLAXIS and HIVES

## 2025-04-30 LAB
C TRACH DNA SPEC QL NAA+PROBE: NEGATIVE
N GONORRHOEA DNA SPEC QL NAA+PROBE: NEGATIVE

## 2025-06-03 ENCOUNTER — OFFICE VISIT (OUTPATIENT)
Dept: OBGYN CLINIC | Facility: CLINIC | Age: 25
End: 2025-06-03

## 2025-06-03 VITALS
HEIGHT: 62 IN | WEIGHT: 128 LBS | DIASTOLIC BLOOD PRESSURE: 78 MMHG | SYSTOLIC BLOOD PRESSURE: 123 MMHG | BODY MASS INDEX: 23.55 KG/M2

## 2025-06-03 DIAGNOSIS — Z12.4 SCREENING FOR CERVICAL CANCER: ICD-10-CM

## 2025-06-03 DIAGNOSIS — Z01.419 ENCOUNTER FOR ANNUAL ROUTINE GYNECOLOGICAL EXAMINATION: Primary | ICD-10-CM

## 2025-06-03 PROCEDURE — 99395 PREV VISIT EST AGE 18-39: CPT | Performed by: ADVANCED PRACTICE MIDWIFE

## 2025-06-03 NOTE — PROGRESS NOTES
Subjective:   Patient ID: Deysi Wilkinson is a 25 year old female who presents fro her annual gyne exam.    Relationship: monogamous male partner lives with him and son  Contraception: COCP  Vaccine : HOV completed  Menstruation: monthly spotting-on COCP  Abuse: denies  Diet: well balanced  Exercise: regular   Denies excessive ETOH use, no marijuanan, smoking, vapping or any non prescriptive drug use.   No family hx of breast or ovarian cancer    HPI    History/Other:   Review of Systems   Constitutional: Negative.    Respiratory: Negative.     Cardiovascular: Negative.    Genitourinary: Negative.    Psychiatric/Behavioral: Negative.       Current Medications[1]  Allergies:Allergies[2]    Objective:   Physical Exam  Vitals reviewed.   Constitutional:       General: She is not in acute distress.     Appearance: Normal appearance. She is normal weight. She is not ill-appearing, toxic-appearing or diaphoretic.   Cardiovascular:      Rate and Rhythm: Normal rate.   Pulmonary:      Effort: Pulmonary effort is normal.   Genitourinary:     General: Normal vulva.      Exam position: Lithotomy position.      Labia:         Right: No rash, tenderness, lesion or injury.         Left: No rash, tenderness, lesion or injury.       Urethra: No prolapse.      Vagina: Normal.      Cervix: Normal.   Skin:     General: Skin is dry.   Neurological:      Mental Status: She is alert and oriented to person, place, and time.         Assessment & Plan:   1. Encounter for annual routine gynecological examination    2. Screening for cervical cancer      Counseled patient on PAP screening guidelines     Discussed breast awareness  Discussed weight management and exercise  ACHES reviewed:   Discussed contraceptive method and prevention of STIs  F/u 1 year or prn     Orders Placed This Encounter   Procedures    THIN PREP PAP       Meds This Visit:  Requested Prescriptions      No prescriptions requested or ordered in this encounter        Imaging & Referrals:  None         [1]   Current Outpatient Medications   Medication Sig Dispense Refill    Drospirenone-Ethinyl Estradiol (JYOTSNA) 3-0.02 MG Oral Tab Take 1 tablet by mouth daily. 28 tablet 11    minoxidil 2.5 MG Oral Tab Take 0.5 tablets (1.25 mg total) by mouth daily. 45 tablet 1    methylPREDNISolone (MEDROL) 4 MG Oral Tablet Therapy Pack Dosepack: take as directed 1 each 0    lidocaine 5 % External Patch Place 1 patch onto the skin daily. 14 patch 0    acetaminophen (TYLENOL) 325 MG Oral Tab Take 2 tablets (650 mg total) by mouth every 6 (six) hours as needed. 30 tablet 0    clobetasol 0.05 % External Solution Use twice daily Monday-Friday with flares on scalp. Do not use on face. 50 mL 6    fluconazole (DIFLUCAN) 150 MG Oral Tab Take 1 tablet (150 mg total) by mouth every 3 (three) days. Take one now and repeat in 72 hours (Patient not taking: Reported on 11/25/2024) 2 tablet 0    Benzocaine-Menthol (CEPACOL) 15-2.3 MG Mouth/Throat Lozenge Use as directed 1 lozenge in the mouth or throat as needed. (Patient not taking: Reported on 11/25/2024) 16 lozenge 0    traMADol 50 MG Oral Tab Take 1 tablet (50 mg total) by mouth every 6 (six) hours as needed for Pain. (Patient not taking: Reported on 11/25/2024) 20 tablet 0    metoclopramide (REGLAN) 10 MG Oral Tab Take 1 tablet (10 mg total) by mouth every 6 (six) hours as needed. (Patient not taking: Reported on 11/25/2024) 20 tablet 0    valACYclovir 500 MG Oral Tab Take 1 tablet (500 mg total) by mouth daily. (Patient not taking: Reported on 11/25/2024) 30 tablet 5    ferrous sulfate 325 (65 FE) MG Oral Tab EC Take 1 tablet (325 mg total) by mouth every other day. 30 tablet 5    docusate sodium 100 MG Oral Cap Take 100 mg by mouth 2 (two) times daily as needed for constipation. (Patient not taking: Reported on 11/25/2024) 30 capsule 0    Misc. Devices (BREAST PUMP) Does not apply Misc Double electric breast pump CARMINA- 1/13/23 (Patient not taking:  Reported on 11/25/2024) 1 each 0    PRENAT VIT-FEPOLY-METHYLFOL-FA OR Take by mouth. (Patient not taking: Reported on 11/25/2024)     [2]   Allergies  Allergen Reactions    Seafood ANAPHYLAXIS    Strawberries ANAPHYLAXIS and HIVES

## 2025-06-05 ENCOUNTER — LAB ENCOUNTER (OUTPATIENT)
Dept: LAB | Facility: HOSPITAL | Age: 25
End: 2025-06-05
Attending: FAMILY MEDICINE
Payer: MEDICAID

## 2025-06-05 DIAGNOSIS — Z30.09 FAMILY PLANNING: ICD-10-CM

## 2025-06-05 LAB
HCG SERPL QL: NEGATIVE
LAST PAP RESULT: NORMAL

## 2025-06-05 PROCEDURE — 36415 COLL VENOUS BLD VENIPUNCTURE: CPT

## 2025-06-05 PROCEDURE — 84703 CHORIONIC GONADOTROPIN ASSAY: CPT

## 2025-06-12 ENCOUNTER — OFFICE VISIT (OUTPATIENT)
Dept: FAMILY MEDICINE CLINIC | Facility: CLINIC | Age: 25
End: 2025-06-12
Payer: MEDICAID

## 2025-06-12 VITALS
HEIGHT: 62 IN | RESPIRATION RATE: 18 BRPM | SYSTOLIC BLOOD PRESSURE: 123 MMHG | DIASTOLIC BLOOD PRESSURE: 78 MMHG | WEIGHT: 130 LBS | HEART RATE: 65 BPM | BODY MASS INDEX: 23.92 KG/M2 | OXYGEN SATURATION: 97 %

## 2025-06-12 DIAGNOSIS — Z00.00 ROUTINE PHYSICAL EXAMINATION: Primary | ICD-10-CM

## 2025-06-12 PROCEDURE — 99395 PREV VISIT EST AGE 18-39: CPT | Performed by: FAMILY MEDICINE

## 2025-06-12 NOTE — PROGRESS NOTES
Subjective:   Patient ID: Deysi Wilkinson is a 25 year old female.    Patient is here for routine physical exam. No acute issues. No significant chronic medical problems. Patient is requesting annual testing. Diet and exercise have been good.     Past medical history, family history, and social history were reviewed.  Pt is on birth control and seeing gynecologist. Has had some irregular periods. Had negative blood pregnancy test.         History/Other:   Review of Systems   Constitutional: Negative.    HENT: Negative.     Eyes: Negative.    Respiratory: Negative.  Negative for shortness of breath.    Cardiovascular: Negative.  Negative for chest pain.   Gastrointestinal: Negative.  Negative for abdominal pain.   Endocrine: Negative.    Genitourinary:  Positive for menstrual problem. Negative for dysuria.   Musculoskeletal: Negative.    Skin: Negative.    Allergic/Immunologic: Negative.    Neurological: Negative.  Negative for headaches.   Hematological: Negative.    Psychiatric/Behavioral: Negative.  Negative for dysphoric mood. The patient is not nervous/anxious.      Current Medications[1]  Allergies:Allergies[2]    Objective:   Physical Exam  Constitutional:       Appearance: Normal appearance. She is well-developed.   HENT:      Head: Normocephalic and atraumatic.      Right Ear: Tympanic membrane and external ear normal.      Left Ear: Tympanic membrane and external ear normal.      Nose: Nose normal.      Mouth/Throat:      Mouth: Mucous membranes are moist.      Pharynx: No posterior oropharyngeal erythema.   Eyes:      Conjunctiva/sclera: Conjunctivae normal.   Neck:      Thyroid: No thyroid mass, thyromegaly or thyroid tenderness.   Cardiovascular:      Rate and Rhythm: Normal rate and regular rhythm.      Pulses: Normal pulses.      Heart sounds: Normal heart sounds.   Pulmonary:      Effort: Pulmonary effort is normal.      Breath sounds: Normal breath sounds.   Abdominal:      General: Abdomen is  flat. There is no distension.      Palpations: Abdomen is soft.      Tenderness: There is no abdominal tenderness. There is no guarding or rebound.   Genitourinary:     Vagina: Normal.   Musculoskeletal:         General: Normal range of motion.      Cervical back: Normal range of motion and neck supple.   Lymphadenopathy:      Cervical: No cervical adenopathy.   Skin:     General: Skin is warm.   Neurological:      General: No focal deficit present.      Mental Status: She is alert and oriented to person, place, and time.      Deep Tendon Reflexes: Reflexes are normal and symmetric. Reflexes normal.   Psychiatric:         Mood and Affect: Mood normal.         Behavior: Behavior normal.         Thought Content: Thought content normal.         Judgment: Judgment normal.         Assessment & Plan:   1. Routine physical examination:  - Exam is unremarkable. Screening tests were discussed, and after discussion, will check lab work as below. Healthy diet, exercise, and weight were discussed. To call if problems and follow up and further management after testing. Routine follow up with gyne for well woman exam and irregular periods.          Orders Placed This Encounter   Procedures    Assay, Thyroid Stim Hormone    Lipid Panel    Comp Metabolic Panel (14)    CBC, Platelet; No Differential       Meds This Visit:  Requested Prescriptions      No prescriptions requested or ordered in this encounter       Imaging & Referrals:  None         [1]   Current Outpatient Medications   Medication Sig Dispense Refill    Drospirenone-Ethinyl Estradiol (JYOTSNA) 3-0.02 MG Oral Tab Take 1 tablet by mouth daily. 28 tablet 11   [2]   Allergies  Allergen Reactions    Seafood ANAPHYLAXIS    Strawberries ANAPHYLAXIS and HIVES

## 2025-07-22 NOTE — TELEPHONE ENCOUNTER
Pt mother notified of order and that we need to obtain auth through ins which can take about 1 week. We will contact her once approved. She verbalized understanding. You will be contacted in 1-2 days for results of your lab tests.     Turn in stool sample at your soonest convenience.    Schedule thyroid ultrasound.    If stool and blood tests are normal but you still have lower abdominal pain in the next week or so, will obtain a CT scan of the abdomen.    Drink at least 8 full glasses of water a day, and 1-2 glasses after you have loose stools.    Try to avoid food that you may think cause loose stools.  Avoid greasy and creamy food.    Read more information about your conditions in the handouts attached to this visit summary.

## (undated) NOTE — ED AVS SNAPSHOT
[facility] AND [facility] Immediate Care in [facility] [address]  [address] [facility]    Phone:  [phone]    Fax:  [address]   MRN: [id number]    Department:  [facility] AND [facility] Immediate Care in [address] [place] [address]   Date of Visit:  [address]     Phone:  [phone]    - penicillin v potassium 500 MG Tabs      You can get these medications from any pharmacy     Bring a paper prescription for each of these medications    - penicillin [facility]. Follow-up care is at the discretion of that Physician.   If you need additional assistance selecting a physician, you may call the [place] [address] Physician Referral and Class Registration line at [phone] or f [facility] [address] [employer]. ([facility] 43) [address] [facility] [address] [address]  [place] [address]) [phone]                Additional Information       We are concerned for your overall well being:    - If you are a smoker or

## (undated) NOTE — LETTER
VACCINE ADMINISTRATION RECORD  PARENT / GUARDIAN APPROVAL  Date: 2018  Vaccine administered to: Delia Fountain     : 2000    MRN: [id number]    A copy of the appropriate Centers for Disease Control and Prevention Vaccine Information statement ha

## (undated) NOTE — MR AVS SNAPSHOT
[address]  [address] [facility] [address]  [phone]               Thank you for choosing us for your health care visit with Abdullahi Zuniga PT.   We are glad to serve you and happy to provide you with this s 4. In 3-4 weeks, pt will demo improved quad strength, with good quad activation on palpation, by at least 75% to be able to ascend/descend stairs reciprocally at school - in progress        MyChart     Sign up for [employer] access for your child.   MyChart ac o creating a rainbow shopping list to find colorful fruits and vegetables  o go on a walking scavenger hunt through the neighborhood   o grow a family garden    In addition to 5, 4, 3, 2, 1 families can make small changes in their family routines to help e

## (undated) NOTE — Clinical Note
I accidentally did her consult even though it was not requested. I only billed the ultrasound part of the visit.  Cleve, Nara

## (undated) NOTE — LETTER
Date & Time: 9/1/2021, 1:04 PM  Patient: Chidi Mi  Encounter Provider(s):    Radha Chapin MD       To Whom It May Concern:    Megan Hammer was seen and treated in our department on 8/30/2021. She is COVID-19 positive.  She is to quarantine u

## (undated) NOTE — LETTER
[facility]  [facility] [place] [address] [id number]     Patient: Chidi Mi   YOB: 2000   Date of Visit: 1/27/2021     Dear Employer,        January 27, 2021    At [facility], we are taking special prec Persons infected with SARS-CoV-2 who never develop COVID-19 symptoms may discontinue isolation and other precautions 10 days after the date of their first positive RT-PCR test for SARS-CoV-2 RNA.     Persons who are asymptomatic but have been exposed, CDC r

## (undated) NOTE — LETTER
Date & Time: 1/15/2020, 1:05 AM  Patient: Tena Pond  Encounter Provider(s):    On File, E D Attending  Maci Claros MD       To Whom It May Concern:    Joanne Haasosta was seen and treated in our department on 1/14/2020.  She should not return to w

## (undated) NOTE — LETTER
[facility] [employer] of Child Health Examination       Student's Name  Andree Can Birth Date Title                           Date     Signature HEALTH HISTORY          TO BE COMPLETED AND SIGNED BY PARENT/GUARDIAN AND VERIFIED BY HEALTH CARE PROVIDER    ALLERGIES  (Food, drug, insect, other)  Strawberries MEDICATION  (List all prescribed or taken on a regular basis.)    Current Outpatient Prescrip PHYSICAL EXAMINATION REQUIREMENTS (head circumference if <33 years old):   /70   Ht 5' 3\" (1.6 m)   Wt 60.3 kg (133 lb)   LMP 06/27/2018 (Approximate)   BMI 23.56 kg/m²     DIABETES SCREENING  BMI>85% age/sex  No And any two of the following:  Fami Cardiovascular/HTN Yes  Nutritional status Yes    Respiratory Yes                   Diagnosis of Asthma: No Mental Health Yes        Currently Prescribed Asthma Medication:            Quick-relief  medication (e.g. Short Acting Beta Antagonist):  No

## (undated) NOTE — LETTER
AUTHORIZATION FOR SURGICAL OPERATION OR OTHER PROCEDURE    1. I hereby authorize Dr. Nieves Dyer, and [facility] [employer] staff assigned to my case to perform the following operation and/or procedure at the [facility]:    IUD REMOVAL__________________________________________________________________________________      _______________________________________________________________________________________________    2. My physician has explained the nature and purpose of the operation or other procedure, possible alternative methods of treatment, the risks involved, and the possibility of complication to me. I acknowledge that no guarantee has been made as to the result that may be obtained. 3.  I recognize that, during the course of this operation, or other procedure, unforseen conditions may necessitate additional or different procedure than those listed above. I, therefore, further authorize and request that the above named physician, his/her physician assistants or designees perform such procedures as are, in his/her professional opinion, necessary and desirable. 4.  Any tissue or organs removed in the operation or other procedure may be disposed of by and at the discretion of the [facility] and [facility]. 5.  I understand that in the event of a medical emergency, I will be transported by local paramedics to [facility] or other hospital emergency department. 6.  I certify that I have read and fully understand the above consent to operation and/or other procedure. 7.  I acknowledge that my physician has explained sedation/analgesia administration to me including the risks and benefits. I consent to the administration of sedation/analgesia as may be necessary or desirable in the judgement of my physician. Witness signature: ___________________________________________________ Date:  ______/______/_____                    Time:  ________ A. M.  P.M. Patient Name:  ______________________________________________________  (please print)      Patient signature:  ___________________________________________________             Relationship to Patient:           []  Parent    Responsible person                          []  Spouse  In case of minor or                    [] Other  _____________   Incompetent name:  __________________________________________________                               (please print)      _____________      Responsible person  In case of minor or  Incompetent signature:  _______________________________________________    Statement of Physician  My signature below affirms that prior to the time of the procedure, I have explained to the patient and/or his/her guardian, the risks and benefits involved in the proposed treatment and any reasonable alternative to the proposed treatment. I have also explained the risks and benefits involved in the refusal of the proposed treatment and have answered the patient's questions.                         Date:  ______/______/_______  Provider                      Signature:  __________________________________________________________       Time:  ___________ A.M    P.M.

## (undated) NOTE — LETTER
Date & Time: 7/13/2020, 2:15 PM  Patient: Escobar Alvarado  Encounter Provider(s):    Dina Fluler MD       To Whom It May Concern:    Kaz Tamara was seen and treated in our department on 7/13/2020.  She can return to work 7/20 or when patient feels c

## (undated) NOTE — ED AVS SNAPSHOT
Eb Reyes   MRN: [id number]    Department:  [facility] Emergency Department   Date of Visit:  12/19/2017           Disclosure     Insurance plans vary and the physician(s) referred by the ER may not be covered by your plan.  Please contact y CARE PHYSICIAN AT ONCE OR RETURN IMMEDIATELY TO THE EMERGENCY DEPARTMENT. If you have been prescribed any medication(s), please fill your prescription right away and begin taking the medication(s) as directed.   If you believe that any of the medications

## (undated) NOTE — LETTER
Date & Time: 7/16/2019, 3:22 PM  Patient: Valentina Thapa  Encounter Provider(s):    TOYA Anton       To Whom It May Concern:    Cynthia Martinez was seen and treated in our department on 7/16/2019.  She should not return to work until 7/18

## (undated) NOTE — LETTER
AUTHORIZATION FOR SURGICAL OPERATION OR OTHER PROCEDURE    1. I hereby authorize Kat Armstrong CNM and [employer] staff assigned to my case to perform the following operation and/or procedure at the [facility]:    IUD removal     2. My physician has explained the nature and purpose of the operation or other procedure, possible alternative methods of treatment, the risks involved, and the possibility of complication to me. I acknowledge that no guarantee has been made as to the result that may be obtained. 3.  I recognize that, during the course of this operation, or other procedure, unforseen conditions may necessitate additional or different procedure than those listed above. I, therefore, further authorize and request that the above named physician, his/her physician assistants or designees perform such procedures as are, in his/her professional opinion, necessary and desirable. 4.  Any tissue or organs removed in the operation or other procedure may be disposed of by and at the discretion of the [facility] and [facility]. 5.  I understand that in the event of a medical emergency, I will be transported by local paramedics to [facility] or other hospital emergency department. 6.  I certify that I have read and fully understand the above consent to operation and/or other procedure. 7.  I acknowledge that my physician has explained sedation/analgesia administration to me including the risks and benefits. I consent to the administration of sedation/analgesia as may be necessary or desirable in the judgement of my physician. Witness signature: ___________________________________________________ Date:  ______/______/_____                    Time:  ________ A. M.  P.M.        Patient Name:  ______________________________________________________  (please print)      Patient signature: ___________________________________________________             Relationship to Patient:           []  Parent    Responsible person                          []  Spouse  In case of minor or                    [] Other  _____________   Incompetent name:  __________________________________________________                               (please print)      _____________      Responsible person  In case of minor or  Incompetent signature:  _______________________________________________    Statement of Physician  My signature below affirms that prior to the time of the procedure, I have explained to the patient and/or his/her guardian, the risks and benefits involved in the proposed treatment and any reasonable alternative to the proposed treatment. I have also explained the risks and benefits involved in the refusal of the proposed treatment and have answered the patient's questions.                         Date:  ______/______/_______  Provider                      Signature:  __________________________________________________________       Time:  ___________ A.M    P.M.

## (undated) NOTE — LETTER
[address]  [address]  Dept: [phone]  Dept Fax: [phone]  Loc: [phone]      June 12, 2017    Patient: Gómez Meza   Date of Visit: 6/12/2017       To Whom It May Concern:    Linsey Bravo

## (undated) NOTE — LETTER
7/24/2020          To Whom It May Concern:    Linda Calhoun was seen and evaluated for a medical condition on 7/23/2020. She may not return to work at this time. She may return to work on 7/27/2020. Activity is restricted as follows: none.     If you r

## (undated) NOTE — LETTER
Date & Time: 11/30/2024, 2:17 PM  Patient: Deysi Wilkinson  Encounter Provider(s):    Ella Alfredo DO       To Whom It May Concern:    Deysi Wilkinson was seen and treated in our department on 11/30/2024. She can return to work on 12/1/2024.    If you have any questions or concerns, please do not hesitate to call.        _____________________________  Physician/APC Signature

## (undated) NOTE — LETTER
12/8/2023              [address]        [facility] [address]         Dear Deisy Neff,    [facility] records indicate that the tests ordered for you by Sheree Villanueva CNM  have not been done. If you have, in fact, already completed the tests or you do not wish to have the tests done, please contact our office at [address] [place] [address]. Otherwise, please proceed with the testing. Enclosed is a duplicate order for your convenience.         Sincerely,    Sheree Villanueva CNM  [facility], [facility], [facility] [address]  [phone]

## (undated) NOTE — LETTER
December 19, 2017    Patient: Ruperto Ferrera   Date of Visit: 12/19/2017       To Whom It May Concern:    Luis Felipe Anderson was seen and treated in our emergency department on 12/19/2017. She can return to school/work on 12/22/17.     If you have any questions

## (undated) NOTE — LETTER
Date & Time: 3/16/2024, 5:02 PM  Patient: Deysi Wilkinson  Encounter Provider(s):    Kobi Lee MD       To Whom It May Concern:    Deysi Wilkinson was seen and treated in our department on 3/16/2024.     If you have any questions or concerns, please do not hesitate to call.        _____________________________  Physician/APC Signature

## (undated) NOTE — LETTER
Date & Time: 4/18/2018, 8:39 AM  Patient: Sherrel Hodgkin  Encounter Provider(s):    JIE Reeder       To Whom It May Concern:    Gwyn Mtz was seen and treated in our department on 4/18/2018. Please excuse her from school today.     If you have a

## (undated) NOTE — LETTER
Patient Name: Rachel Evans  : 2000  MRN: [id number]  Patient Address: [address] [place] [address]      Coronavirus Disease 2019 (COVID-19)     [facility] is committed to the safety and well-being of our patients, symptoms carefully. If your symptoms get worse, call your healthcare provider immediately. 3. Get rest and stay hydrated. 4. If you have a medical appointment, call the healthcare provider ahead of time and tell them that you have or may have COVID-19. without the use of fever-reducing medications; and  · Improvement in respiratory symptoms (e.g., cough, shortness of breath); and  · At least 10 days have passed since symptoms first appeared OR if asymptomatic patient or date of symptom onset is unclear t convalescent plasma donors must:    · Have had a confirmed diagnosis of COVID-19  · Be symptom-free for at least 14 days*    *Some people will be required to have a repeat COVID-19 test in order to be eligible to donate.  If you’re instructed by Bam [facility]eri Post-COVID conditions to be random. Researchers are trying to identify similarities between people with a Post-COVID condition to better understand if there are risk factors. How do I prevent a Post-COVID condition?   The best way to prevent the long-t

## (undated) NOTE — LETTER
Date & Time: 1/27/2021, 9:18 AM  Patient: Linda Calhoun  Encounter Provider(s):    Scarlet Luu MD       To Whom It May Concern:    Becky Morton was seen and treated in our department on 1/27/2021.  She was COVID tested in ER today    If you have

## (undated) NOTE — LETTER
December 19, 2017    Patient: Yoseph Kim   Date of Visit: 12/19/2017       To Whom It May Concern:    Sea Grover was seen and treated in our emergency department on 12/19/2017.  She can return to work and school on 12/22/2017    If you have any ques

## (undated) NOTE — LETTER
[facility] [place] [address] [facility] [address]  Dept: [phone]  Dept Fax: [phone]  Loc: [phone]      April 28, 2017    Patient: Delisa Olson   Date of Visit: 4/26/2017       To Whom It May Concern:    García Coto

## (undated) NOTE — MR AVS SNAPSHOT
[facility] [place] [address] [address]  [address] [phone]               Thank you for choosing us for your health care visit with Blane Griffith MD.  We are glad to serve you and happy to provide you with this summary o 98.3 °F (36.8 °C) (Tympanic) 5' 3\" (1.6 m) (34 %*, Z = -0.40)    Weight BMI    136 lb (61.689 kg) (76 %*, Z = 0.71) 24.10 kg/m2 (82 %*, Z = 0.92)    *Growth percentiles are based on CDC 2-20 Years data     BP percentiles are based on 2000 NHANES data [employer] access allows you to view health information for your child from their recent   visit, view other health information and more. To sign up or find more information on getting   Proxy Access to your child’s [employer] go to [email or web address]

## (undated) NOTE — LETTER
AUTHORIZATION FOR SURGICAL OPERATION OR OTHER PROCEDURE    1. I hereby authorize , and [facility] staff assigned to my case to perform the following operation and/or procedure at the [facility]:    IUD REMOVAL    2.   My physician h Patient:           []  Parent    Responsible person                          []  Spouse  In case of minor or                    [] Other  _____________   Incompetent name:  __________________________________________________                               (p

## (undated) NOTE — LETTER
AUTHORIZATION FOR SURGICAL OPERATION OR OTHER PROCEDURE    1. I hereby authorize ,Rubin Payne CNM  and [facility] staff assigned to my case to perform the following operation and/or procedure at the [employer] [facility]:  IUD removal.    2.  My physician has explained the nature and purpose of the operation or other procedure, possible alternative methods of treatment, the risks involved, and the possibility of complication to me. I acknowledge that no guarantee has been made as to the result that may be obtained. 3.  I recognize that, during the course of this operation, or other procedure, unforseen conditions may necessitate additional or different procedure than those listed above. I, therefore, further authorize and request that the above named physician, his/her physician assistants or designees perform such procedures as are, in his/her professional opinion, necessary and desirable. 4.  Any tissue or organs removed in the operation or other procedure may be disposed of by and at the discretion of the [facility] and [facility]. 5.  I understand that in the event of a medical emergency, I will be transported by local paramedics to [facility] or other hospital emergency department. 6.  I certify that I have read and fully understand the above consent to operation and/or other procedure. 7.  I acknowledge that my physician has explained sedation/analgesia administration to me including the risks and benefits. I consent to the administration of sedation/analgesia as may be necessary or desirable in the judgement of my physician. Witness signature: ___________________________________________________ Date:  ______/______/_____                    Time:  ________ A. M.  P.M.        Patient Name:  ______________________________________________________  (please print)      Patient signature: ___________________________________________________             Relationship to Patient:           []  Parent    Responsible person                          []  Spouse  In case of minor or                    [] Other  _____________   Incompetent name:  __________________________________________________                               (please print)      _____________      Responsible person  In case of minor or  Incompetent signature:  _______________________________________________    Statement of Physician  My signature below affirms that prior to the time of the procedure, I have explained to the patient and/or his/her guardian, the risks and benefits involved in the proposed treatment and any reasonable alternative to the proposed treatment. I have also explained the risks and benefits involved in the refusal of the proposed treatment and have answered the patient's questions.                         Date:  ______/______/_______  Provider                      Signature:  __________________________________________________________       Time:  ___________ A.M    P.M.

## (undated) NOTE — LETTER
Date & Time: 12/8/2022, 3:23 PM  Patient: Seth Crane  Encounter Provider(s):    TOYA Pagan       To Whom It May Concern:    Josse Bonilla was seen and treated in our department on 12/8/2022. She can return to work 12/09/2022.     If you have any questions or concerns, please do not hesitate to call.        _____________________________  Physician/APC Signature

## (undated) NOTE — LETTER
3/27/2018          To Whom It May Concern:    Sherrel Hodgkin is currently under my medical care and she may play softball as knee pain allows. If you require additional information please contact our office.         Sincerely,    Michi Paulino MD

## (undated) NOTE — LETTER
[phone] [id number]  [phone]          Patient: Clair Hathaway   YOB: 2000   Date of Visit: 4/8/2020       Dear Employer,         April 8, 2020    At [facility], it is at all feasible for them to do so. COVID-19 is especially risky for high-risk patients (including, but not limited to, age over 61, immunosuppressed status due to disease or medication, chronic respiratory or heart conditions and diabetes).     · Kelly Rosales

## (undated) NOTE — LETTER
AUTHORIZATION FOR SURGICAL OPERATION OR OTHER PROCEDURE    1.  I hereby authorize Brooke Gabriel , and [facility] staff assigned to my case to perform the following operation and/or procedure at the [facility]:    Nexplanon insertion    _______ Time:  ________ A. M.  P.M.        Patient Name:  ______________________________________________________  (please print)      Patient signature:  ___________________________________________________             Relationship to Patient:

## (undated) NOTE — LETTER
AUTHORIZATION FOR SURGICAL OPERATION OR OTHER PROCEDURE    1. I hereby authorize Mesha Brown CNM , and [employer] staff assigned to my case to perform the following operation and/or procedure at the [employer] [facility]:    IUD INSERTION _____________________________________________________________________________      _______________________________________________________________________________________________    2. My physician has explained the nature and purpose of the operation or other procedure, possible alternative methods of treatment, the risks involved, and the possibility of complication to me. I acknowledge that no guarantee has been made as to the result that may be obtained. 3.  I recognize that, during the course of this operation, or other procedure, unforseen conditions may necessitate additional or different procedure than those listed above. I, therefore, further authorize and request that the above named physician, his/her physician assistants or designees perform such procedures as are, in his/her professional opinion, necessary and desirable. 4.  Any tissue or organs removed in the operation or other procedure may be disposed of by and at the discretion of the [place] [employer] and [address]. 5.  I understand that in the event of a medical emergency, I will be transported by local paramedics to [facility] or other hospital emergency department. 6.  I certify that I have read and fully understand the above consent to operation and/or other procedure. 7.  I acknowledge that my physician has explained sedation/analgesia administration to me including the risks and benefits. I consent to the administration of sedation/analgesia as may be necessary or desirable in the judgement of my physician. Witness signature: ___________________________________________________ Date:  ______/______/_____                    Time:  ________ A. M.  P.M. Patient Name:  ______________________________________________________  (please print)      Patient signature:  ___________________________________________________             Relationship to Patient:           []  Parent    Responsible person                          []  Spouse  In case of minor or                    [] Other  _____________   Incompetent name:  __________________________________________________                               (please print)      _____________      Responsible person  In case of minor or  Incompetent signature:  _______________________________________________    Statement of Physician  My signature below affirms that prior to the time of the procedure, I have explained to the patient and/or his/her guardian, the risks and benefits involved in the proposed treatment and any reasonable alternative to the proposed treatment. I have also explained the risks and benefits involved in the refusal of the proposed treatment and have answered the patient's questions.                         Date:  ______/______/_______  Provider                      Signature:  __________________________________________________________       Time:  ___________ A.M    P.M.

## (undated) NOTE — LETTER
12/30/2021          To Whom It May Concern:    Jae Krishnan is currently under my medical care. Please be advised that the patient has recovered from her COVID 19 infection.   The patient will have completed the recommended quarantine period of 5 days

## (undated) NOTE — LETTER
9/27/2022          To Whom It May Concern:    Saji Abernathy is currently under my medical care. Please exempt Christiano Madison from wearing nylons/tights at work for the remainder of her pregnancy  If you require additional information please contact our office.         Sincerely,    Wayne Chan CNM

## (undated) NOTE — MR AVS SNAPSHOT
[address]  [address] [facility] [address]  [phone]  [phone]               Thank you for choosing us for your health care visit with [facility].   We are glad to serve you and happy to provide you with this s visit, view other health information and more. To sign up or find more information on getting   Proxy Access to your child’s MyChart go to [email or web address] and click on the   Sign Up Forms link in the Additional Information box on the right.

## (undated) NOTE — LETTER
[facility] [place] [address] [address]  Dept: [phone]  Dept Fax: [phone]: [phone]      April 26, 2017    Patient: Gómez Meza   Date of Visit: 4/26/2017       To Whom It May Concern:    Linsey Bravo

## (undated) NOTE — LETTER
AUTHORIZATION FOR SURGICAL OPERATION OR OTHER PROCEDURE    1. I hereby authorize Shira HORTA , and [employer] staff assigned to my case to perform the following operation and/or procedure at the [facility]:    IUD INSERTION_____________________________________________________________________________      _______________________________________________________________________________________________    2. My physician has explained the nature and purpose of the operation or other procedure, possible alternative methods of treatment, the risks involved, and the possibility of complication to me. I acknowledge that no guarantee has been made as to the result that may be obtained. 3.  I recognize that, during the course of this operation, or other procedure, unforseen conditions may necessitate additional or different procedure than those listed above. I, therefore, further authorize and request that the above named physician, his/her physician assistants or designees perform such procedures as are, in his/her professional opinion, necessary and desirable. 4.  Any tissue or organs removed in the operation or other procedure may be disposed of by and at the discretion of the [place] [employer] and [address]. 5.  I understand that in the event of a medical emergency, I will be transported by local paramedics to [facility] or other hospital emergency department. 6.  I certify that I have read and fully understand the above consent to operation and/or other procedure. 7.  I acknowledge that my physician has explained sedation/analgesia administration to me including the risks and benefits. I consent to the administration of sedation/analgesia as may be necessary or desirable in the judgement of my physician. Witness signature: ___________________________________________________ Date:  ______/______/_____                    Time:  ________ A. M.  P.M. Patient Name:  ______________________________________________________  (please print)      Patient signature:  ___________________________________________________             Relationship to Patient:           []  Parent    Responsible person                          []  Spouse  In case of minor or                    [] Other  _____________   Incompetent name:  __________________________________________________                               (please print)      _____________      Responsible person  In case of minor or  Incompetent signature:  _______________________________________________    Statement of Physician  My signature below affirms that prior to the time of the procedure, I have explained to the patient and/or his/her guardian, the risks and benefits involved in the proposed treatment and any reasonable alternative to the proposed treatment. I have also explained the risks and benefits involved in the refusal of the proposed treatment and have answered the patient's questions.                         Date:  ______/______/_______  Provider                      Signature:  __________________________________________________________       Time:  ___________ A.M    P.M.

## (undated) NOTE — Clinical Note
Dear Dr. Estrada Kennedy    This letter is to inform you that Justin Drake has been attending Physical Therapy with me. See below for my most recent plan of care.     Patient Name: Justin Drake, : 2000, MRN: [id number]   Date:  2017  Referri Strength/MMT:    Hip  Knee  Foot/Ankle    Flexion: R 5/5; L 4+/5  Abduction: R 4+/5; L 4+/5  Flexion: R 5/5; L 4+/5  Extension: R 5/5; L 4+/5     DF: R 5/5; L 5/5  PF: R 5/5; L 3/5  INV: R 5/5; L 5/5  EV: R 5/5; L 5/5    *pt unable to resist secondary to p

## (undated) NOTE — LETTER
Date & Time: 12/2/2024, 9:21 AM  Patient: Deysi Wilkinson  Encounter Provider(s):    Mariano Zeng PA       To Whom It May Concern:    Deysi Wilkinson was seen and treated in our department on 12/2/2024.  Please excuse Ms. Wilkinson until December 9, 2024 or until medically cleared by occupational health for work-related injury.    Dx: Acute low back pain; right low back contusion; right radicular low back pain    If you have any questions or concerns, please do not hesitate to call.        _____________________________  Physician/APC Signature

## (undated) NOTE — LETTER
Date & Time: 11/3/2021, 12:22 PM  Patient: Antolin Jones  Encounter Provider(s):    Garrett Colby, TOYA       To Whom It May Concern:    Diego Colbert was seen and treated in our department on 11/3/2021. She can return to work 11/4/21.     If you

## (undated) NOTE — Clinical Note
Date & Time: 11/30/2024, 2:16 PM  Patient: Deysi Wilkinson  Encounter Provider(s):    Ella Alfredo DO       To Whom It May Concern:    Deysi Wilkinson was seen and treated in our department on 11/30/2024. She {Return to school/sport/work:[id number]}.    If you have any questions or concerns, please do not hesitate to call.        _____________________________  Physician/APC Signature

## (undated) NOTE — LETTER
[facility] [place] [address] [address]  [phone]     Patient: Maxx Navas   YOB: 2000   Date of Visit: 12/26/2021     Dear Employer,        December 26, 2021    At [facility], we are taking special prec discontinue isolation and other precautions 10 days after the date of their first positive RT-PCR test for SARS-CoV-2 RNA.     Persons who are asymptomatic but have been exposed, CDC recommends 14 days of quarantine after exposure based on the time it takes

## (undated) NOTE — MR AVS SNAPSHOT
[address]  [phone]               Thank you for choosing us for your health care visit with Mandie Talavera MD.  We are glad to serve you and happy to provide you with this summ stairs reciprocally at school - [employer]        [employer]     Sign up for [employer] access for your child. [employer] access allows you to view health information for your child from their recent   visit, view other health information and more.   To sign up or find mo

## (undated) NOTE — LETTER
Date & Time: 1/15/2020, 7:40 AM  Patient: Patrick Cárdenas  Encounter Provider(s):    On File, E D Attending  Guanakito Canchola MD       To Whom It May Concern:    Timothy Arenas was seen and treated in our department on 1/14/2020.  She can return to work [employer]

## (undated) NOTE — ED AVS SNAPSHOT
Enrique Jordan   MRN: [id number]    Department:  [facility] Emergency Department   Date of Visit:  1/14/2020           Disclosure     Insurance plans vary and the physician(s) referred by the ER may not be covered by your plan.  Please contac CARE PHYSICIAN AT ONCE OR RETURN IMMEDIATELY TO THE EMERGENCY DEPARTMENT. If you have been prescribed any medication(s), please fill your prescription right away and begin taking the medication(s) as directed.   If you believe that any of the medications

## (undated) NOTE — LETTER
10/20/22     Re: Darvin Mcfarland  : 2000    To Whom It May Concern:  Darvin Mcfarland is under my care for her pregnancy. She is sick with COVID as well as strep throat. Please excuse her from school/work until her follow up appointment on Wednesday 10/26/2022 to allow her proper healing time and follow up care as well as reduce transmission of these infections in the community. If you have any questions concerning this letter, please feel free to contact my office.       Sincerely yours,      Marcela Gómez CNM

## (undated) NOTE — ED AVS SNAPSHOT
[facility] in [address] [address] [place] [address]    Phone:  [phone]    Fax:  [phone]           Frandy Fulton   MRN: [id number]    Department:  [facility] in [address] [place] [address]   Date of Visit:  4/2 service to you, we would appreciate any positive or negative feedback related to the care you received in our Immediate Care. Please call our [address] at [phone]. Your Immediate Care team is here to serve you. You are our top priority.     You w [facility]  WEEKENDS AND HOLIDAYS 8AM - 6PM    I certified that I have received a copy of the aftercare instructions; that these instructions have been explained to me; all questions pertaining to these instructions have been answered visit, view other health information and more. To sign up or find more information on getting   Proxy Access to your child’s MyChart go to [email or web address] and click on the   Sign Up Forms link in the Additional Information box on the right.

## (undated) NOTE — Clinical Note
Thank you for the consult. I saw MsBradley Percy Pope in  the endocrine/diabetes clinic today. Please see attached my note. Please feel free to contact me with any questions. Thanks!

## (undated) NOTE — LETTER
Name:  Nael Carrasco Year:  12th Grade Class: Student ID No.:   Address:  [address] [place] [address] Phone:  [phone] (home)  :  17year old   Name Relationship [address] Work Phone Home Phone Mobile Phone   1.  SAD syndrome, short QT syndrome, Brugada syndrome, or catecholaminergic polymorphic ventricular tachycardia? 13. Does anyone in your family have a heart problem, pacemaker, or implanted defibrillator?      12. Has anyone in your family had unexplained faint 37. Do you have headaches with exercise? 38. Have you ever had numbness, tingling, or weakness in your arms or legs after being hit or falling? 39.Have you ever been unable to move your arms / legs after being hit /fall? 40.  Have you ever becom MVP, aortic insufficiency) Yes    Eyes/Ears/Nose/Throat:  Pupils equal    Hearing Yes    Lymph nodes Yes    Heart*  · Murmurs (auscultation standing, supine, +/- Valsalva)  · Location of point of maximal impulse (PMI) Yes    Pulses Yes    Lungs Yes    Abdo defined in the [facility] Performance-Enhancing Substance Testing Program Protocol.  We have reviewed the policy and understand that I/our student may be asked to submit to testing for the presence of performance-enhancing substances in my/his/her body either dur

## (undated) NOTE — LETTER
Date & Time: 10/29/2019, 12:06 PM  Patient: Barbara Gilliam  Encounter Provider(s):    TOYA Fink       To Whom It May Concern:    Marjan Novak was seen and treated in our department on 10/29/2019.  She should not return to work until 10/30/19

## (undated) NOTE — ED AVS SNAPSHOT
Rissa Reese   MRN: [id number]    Department:  [facility] Emergency Department   Date of Visit:  7/16/2019           Disclosure     Insurance plans vary and the physician(s) referred by the ER may not be covered by your plan.  Please contac CARE PHYSICIAN AT ONCE OR RETURN IMMEDIATELY TO THE EMERGENCY DEPARTMENT. If you have been prescribed any medication(s), please fill your prescription right away and begin taking the medication(s) as directed.   If you believe that any of the medications

## (undated) NOTE — LETTER
20          Stutsman Friday  :  2000      To Whom It May Concern: This patient was seen in our office on 20 for evaluation. If this office may be of further assistance, please do not hesitate to contact us.       Sincerely,

## (undated) NOTE — LETTER
[phone] [phone]     Patient: Jae Krishnan   YOB: 2000   Date of Visit: 1/27/2021     Dear Employer,        January 27, 2021    At [facility], we Persons infected with SARS-CoV-2 who never develop COVID-19 symptoms may discontinue isolation and other precautions 10 days after the date of their first positive RT-PCR test for SARS-CoV-2 RNA.     Persons who are asymptomatic but have been exposed, CDC r

## (undated) NOTE — LETTER
[address]  [address] \[facility]\""  Dept: [phone]  Dept Fax: [phone]  Loc: [phone]      June 12, 2017    Patient: Delisa Olson   Date of Visit: 6/12/2017       To Whom It May Concern:    García Coto

## (undated) NOTE — LETTER
AUTHORIZATION FOR SURGICAL OPERATION OR OTHER PROCEDURE    1. I hereby authorize Tessy Blanco CNM, and [facility] staff assigned to my case to perform the following operation and/or procedure at the [facility]:    IUD INSERTION    2. My physician has explained the nature and purpose of the operation or other procedure, possible alternative methods of treatment, the risks involved, and the possibility of complication to me. I acknowledge that no guarantee has been made as to the result that may be obtained. 3.  I recognize that, during the course of this operation, or other procedure, unforseen conditions may necessitate additional or different procedure than those listed above. I, therefore, further authorize and request that the above named physician, his/her physician assistants or designees perform such procedures as are, in his/her professional opinion, necessary and desirable. 4.  Any tissue or organs removed in the operation or other procedure may be disposed of by and at the discretion of the [place] [facility] and [facility]. 5.  I understand that in the event of a medical emergency, I will be transported by local paramedics to [facility] or other hospital emergency department. 6.  I certify that I have read and fully understand the above consent to operation and/or other procedure. 7.  I acknowledge that my physician has explained sedation/analgesia administration to me including the risks and benefits. I consent to the administration of sedation/analgesia as may be necessary or desirable in the judgement of my physician. Witness signature: ___________________________________________________ Date:  ______/______/_____                    Time:  ________ A. M.  P.M.        Patient Name:  ______________________________________________________  (please print)      Patient signature: ___________________________________________________             Relationship to Patient:           []  Parent    Responsible person                          []  Spouse  In case of minor or                    [] Other  _____________   Incompetent name:  __________________________________________________                               (please print)      _____________      Responsible person  In case of minor or  Incompetent signature:  _______________________________________________    Statement of Physician  My signature below affirms that prior to the time of the procedure, I have explained to the patient and/or his/her guardian, the risks and benefits involved in the proposed treatment and any reasonable alternative to the proposed treatment. I have also explained the risks and benefits involved in the refusal of the proposed treatment and have answered the patient's questions.                         Date:  ______/______/_______  Provider                      Signature:  __________________________________________________________       Time:  ___________ A.M    P.M.

## (undated) NOTE — LETTER
04/10/20        Craig Infante  [phone] [id number]      Dear Veronica Garcia,    Our records indicate that you have outstanding lab work and or testing that was ordered for you and has not yet been completed:  Orders Placed This Encounter

## (undated) NOTE — LETTER
[address]  [address] [facility]  Dept: [phone]  Dept Fax: [phone]  Loc: [phone]      March 9, 2017    Patient: Justin Vidal   Date of Visit: 3/9/2017       To Whom It May Concern:    Fanny Jimenez

## (undated) NOTE — ED AVS SNAPSHOT
[facility] in [facility] [address]  [address] [facility]    Phone:  [phone]    Fax:  [phone]   MRN: [id number]    Department:  [facility] in [address] [place] [address]   Date of Visit:  3 Insurance plans vary and the physician(s) referred by the Immediate Care may not be covered by your plan. It is possible that the physician may not participate in your health insurance plan.   This may result in a lower benefit level being available to yo CARE PHYSICIAN AT ONCE OR GO TO THE EMERGENCY DEPARTMENT. If you have been prescribed any medication(s), please fill your prescription right away and begin taking the medication(s) as directed.   If you believe that any of the medications or instructions - If you have concerns related to behavioral health issues or thoughts of harming yourself, contact [address] [place] [address] at [phone].     - If you don’t have insurance, Naomie Padgett has partnered with [facility]

## (undated) NOTE — Clinical Note
1.  IUP at 20w3d  2. Normal fetal anatomy  3. Biometric measurements are consistent with dates    This was an ultrasound only encounter (no physician visit). The ultrasound was read by Dr. Madison Rosales and the report was sent to Ms. Whittington to discuss with the patient.

## (undated) NOTE — LETTER
2/17/2025          To Whom It May Concern:    Deysi Wilkinson is currently under my medical care.  Please excuse the patient from work missed as the patient has been ill with iinfluenza.  May return to work when well and recovered from her illness.      If you require additional information please contact our office.        Sincerely,      Adrian Quintero MD          Document generated by:  Adrian Quintero MD